# Patient Record
Sex: MALE | Race: WHITE | NOT HISPANIC OR LATINO | Employment: FULL TIME | URBAN - METROPOLITAN AREA
[De-identification: names, ages, dates, MRNs, and addresses within clinical notes are randomized per-mention and may not be internally consistent; named-entity substitution may affect disease eponyms.]

---

## 2017-03-18 ENCOUNTER — HOSPITAL ENCOUNTER (EMERGENCY)
Facility: HOSPITAL | Age: 32
Discharge: HOME | End: 2017-03-18
Attending: EMERGENCY MEDICINE
Payer: COMMERCIAL

## 2017-03-18 VITALS
RESPIRATION RATE: 18 BRPM | BODY MASS INDEX: 28.35 KG/M2 | WEIGHT: 160 LBS | HEART RATE: 65 BPM | SYSTOLIC BLOOD PRESSURE: 155 MMHG | HEIGHT: 63 IN | TEMPERATURE: 98 F | OXYGEN SATURATION: 98 % | DIASTOLIC BLOOD PRESSURE: 80 MMHG

## 2017-03-18 DIAGNOSIS — R19.7 DIARRHEA, UNSPECIFIED TYPE: Primary | ICD-10-CM

## 2017-03-18 LAB
ALBUMIN SERPL BCP-MCNC: 4.5 G/DL
ALP SERPL-CCNC: 86 U/L
ALT SERPL W/O P-5'-P-CCNC: 76 U/L
ANION GAP SERPL CALC-SCNC: 10 MMOL/L
AST SERPL-CCNC: 25 U/L
BASOPHILS # BLD AUTO: 0.02 K/UL
BASOPHILS NFR BLD: 0.1 %
BILIRUB SERPL-MCNC: 0.5 MG/DL
BUN SERPL-MCNC: 19 MG/DL
CALCIUM SERPL-MCNC: 9.9 MG/DL
CHLORIDE SERPL-SCNC: 104 MMOL/L
CO2 SERPL-SCNC: 27 MMOL/L
CREAT SERPL-MCNC: 1 MG/DL
DIFFERENTIAL METHOD: ABNORMAL
EOSINOPHIL # BLD AUTO: 0 K/UL
EOSINOPHIL NFR BLD: 0.3 %
ERYTHROCYTE [DISTWIDTH] IN BLOOD BY AUTOMATED COUNT: 12.4 %
EST. GFR  (AFRICAN AMERICAN): >60 ML/MIN/1.73 M^2
EST. GFR  (NON AFRICAN AMERICAN): >60 ML/MIN/1.73 M^2
GLUCOSE SERPL-MCNC: 147 MG/DL
HCT VFR BLD AUTO: 48.2 %
HGB BLD-MCNC: 16.4 G/DL
LYMPHOCYTES # BLD AUTO: 2.2 K/UL
LYMPHOCYTES NFR BLD: 15.4 %
MCH RBC QN AUTO: 32.2 PG
MCHC RBC AUTO-ENTMCNC: 34 %
MCV RBC AUTO: 95 FL
MONOCYTES # BLD AUTO: 0.7 K/UL
MONOCYTES NFR BLD: 5 %
NEUTROPHILS # BLD AUTO: 11.1 K/UL
NEUTROPHILS NFR BLD: 79.2 %
PLATELET # BLD AUTO: 326 K/UL
PMV BLD AUTO: 9.4 FL
POTASSIUM SERPL-SCNC: 4.5 MMOL/L
PROT SERPL-MCNC: 8 G/DL
RBC # BLD AUTO: 5.09 M/UL
SODIUM SERPL-SCNC: 141 MMOL/L
WBC # BLD AUTO: 14.06 K/UL

## 2017-03-18 PROCEDURE — 96361 HYDRATE IV INFUSION ADD-ON: CPT

## 2017-03-18 PROCEDURE — 80053 COMPREHEN METABOLIC PANEL: CPT

## 2017-03-18 PROCEDURE — 99283 EMERGENCY DEPT VISIT LOW MDM: CPT | Mod: 25

## 2017-03-18 PROCEDURE — 85025 COMPLETE CBC W/AUTO DIFF WBC: CPT

## 2017-03-18 PROCEDURE — 25000003 PHARM REV CODE 250: Performed by: EMERGENCY MEDICINE

## 2017-03-18 PROCEDURE — 96360 HYDRATION IV INFUSION INIT: CPT

## 2017-03-18 RX ORDER — DIPHENOXYLATE HYDROCHLORIDE AND ATROPINE SULFATE 2.5; .025 MG/1; MG/1
1 TABLET ORAL 4 TIMES DAILY PRN
Qty: 12 TABLET | Refills: 0 | Status: SHIPPED | OUTPATIENT
Start: 2017-03-18 | End: 2017-03-28

## 2017-03-18 RX ORDER — LOPERAMIDE HYDROCHLORIDE 2 MG/1
4 CAPSULE ORAL
Status: COMPLETED | OUTPATIENT
Start: 2017-03-18 | End: 2017-03-18

## 2017-03-18 RX ADMIN — LOPERAMIDE HYDROCHLORIDE 4 MG: 2 CAPSULE ORAL at 10:03

## 2017-03-18 RX ADMIN — SODIUM CHLORIDE 1000 ML: 0.9 INJECTION, SOLUTION INTRAVENOUS at 10:03

## 2017-03-18 NOTE — ED PROVIDER NOTES
SCRIBE #1 NOTE: I, Thomas Funk, am scribing for, and in the presence of, Alfredo Gallegos MD. I have scribed the entire note.      History      Chief Complaint   Patient presents with    Abdominal Pain     pt. c/o abdominal pain and diarrhea        Review of patient's allergies indicates:   Allergen Reactions    Pcn [penicillins]         HPI   HPI    3/18/2017, 9:42 AM   History obtained from the patient      History of Present Illness: Peewee Soto is a 31 y.o. male patient who presents to the Emergency Department for diarrhea which onset gradually yesterday. Symptoms are constant and moderate in severity. Pt reports he ate eggs for breakfast yesterday morning, and started to have multiple episodes of diarrhea a few hours later. States he thinks the eggs might have gone bad. No mitigating or exacerbating factors reported. Associated sxs include abdominal cramping and emesis (1 episode). Patient denies any fever, chills, CP, SOB, hematochezia, hematemesis, dysuria, weakness/numbness, dizziness, and all other sxs at this time. No prior tx PTA. No further complaints or concerns at this time.       Arrival mode: Personal vehicle     PCP: Primary Doctor No       Past Medical History:  Past Medical History:   Diagnosis Date    Acid reflux     Undescended testicle before puberty        Past Surgical History:  Past Surgical History:   Procedure Laterality Date    APPENDECTOMY N/A 11/11/2015    Procedure: APPENDECTOMY ;  Surgeon: Osvaldo Guy MD;  Location: [facility];  Service: General;  Laterality: N/A;    HERNIA REPAIR           Family History:  Family History   Problem Relation Age of Onset    Diabetes Mother     Hypertension Father     Heart disease Maternal Grandmother        Social History:  Social History     Social History Main Topics    Smoking status: Never Smoker    Smokeless tobacco: Not given    Alcohol use No    Drug use: No    Sexual activity: No       ROS   Review of Systems   Constitutional:  Negative for chills and fever.   HENT: Negative for sore throat.    Respiratory: Negative for shortness of breath.    Cardiovascular: Negative for chest pain.   Gastrointestinal: Positive for abdominal pain (cramping), diarrhea and vomiting. Negative for blood in stool.   Genitourinary: Negative for dysuria.   Musculoskeletal: Negative for back pain.   Skin: Negative for rash.   Neurological: Negative for dizziness, weakness and numbness.   Hematological: Does not bruise/bleed easily.   All other systems reviewed and are negative.    Physical Exam    Initial Vitals   BP Pulse Resp Temp SpO2   03/18/17 0930 03/18/17 0930 03/18/17 0930 03/18/17 0930 03/18/17 0930   156/90 96 18 98 °F (36.7 °C) 98 %      Physical Exam  Nursing Notes and Vital Signs Reviewed.  Constitutional: Patient is in no acute distress. Awake and alert. Well-developed and well-nourished.  Head: Atraumatic. Normocephalic.  Eyes: PERRL. EOM intact. Conjunctivae are not pale. No scleral icterus.  ENT: Mucous membranes are moist. Oropharynx is clear and symmetric.    Neck: Supple. Full ROM. No lymphadenopathy.  Cardiovascular: Regular rate. Regular rhythm. No murmurs, rubs, or gallops. Distal pulses are 2+ and symmetric.  Pulmonary/Chest: No respiratory distress. Clear to auscultation bilaterally. No wheezing, rales, or rhonchi.  Abdominal: Soft and non-distended.  There is no tenderness.  No rebound, guarding, or rigidity. Good bowel sounds.  Genitourinary: No CVA tenderness  Musculoskeletal: Moves all extremities. No obvious deformities. No edema. No calf tenderness.  Skin: Warm and dry.  Neurological:  Alert, awake, and appropriate.  Normal speech.  No acute focal neurological deficits are appreciated.  Psychiatric: Normal affect. Good eye contact. Appropriate in content.    ED Course    Procedures  ED Vital Signs:  Vitals:    03/18/17 0930 03/18/17 1352   BP: (!) 156/90 (!) 155/80   Pulse: 96 65   Resp: 18 18   Temp: 98 °F (36.7 °C)    TempSrc:  "Oral    SpO2: 98%    Weight: 72.6 kg (160 lb)    Height: 5' 3" (1.6 m)        Abnormal Lab Results:  Labs Reviewed   CBC W/ AUTO DIFFERENTIAL - Abnormal; Notable for the following:        Result Value    WBC 14.06 (*)     MCH 32.2 (*)     Gran # 11.1 (*)     Gran% 79.2 (*)     Lymph% 15.4 (*)     All other components within normal limits   COMPREHENSIVE METABOLIC PANEL - Abnormal; Notable for the following:     Glucose 147 (*)     ALT 76 (*)     All other components within normal limits        All Lab Results:  Results for orders placed or performed during the hospital encounter of 03/18/17   CBC auto differential   Result Value Ref Range    WBC 14.06 (H) 3.90 - 12.70 K/uL    RBC 5.09 4.60 - 6.20 M/uL    Hemoglobin 16.4 14.0 - 18.0 g/dL    Hematocrit 48.2 40.0 - 54.0 %    MCV 95 82 - 98 fL    MCH 32.2 (H) 27.0 - 31.0 pg    MCHC 34.0 32.0 - 36.0 %    RDW 12.4 11.5 - 14.5 %    Platelets 326 150 - 350 K/uL    MPV 9.4 9.2 - 12.9 fL    Gran # 11.1 (H) 1.8 - 7.7 K/uL    Lymph # 2.2 1.0 - 4.8 K/uL    Mono # 0.7 0.3 - 1.0 K/uL    Eos # 0.0 0.0 - 0.5 K/uL    Baso # 0.02 0.00 - 0.20 K/uL    Gran% 79.2 (H) 38.0 - 73.0 %    Lymph% 15.4 (L) 18.0 - 48.0 %    Mono% 5.0 4.0 - 15.0 %    Eosinophil% 0.3 0.0 - 8.0 %    Basophil% 0.1 0.0 - 1.9 %    Differential Method Automated    Comprehensive metabolic panel   Result Value Ref Range    Sodium 141 136 - 145 mmol/L    Potassium 4.5 3.5 - 5.1 mmol/L    Chloride 104 95 - 110 mmol/L    CO2 27 23 - 29 mmol/L    Glucose 147 (H) 70 - 110 mg/dL    BUN, Bld 19 6 - 20 mg/dL    Creatinine 1.0 0.5 - 1.4 mg/dL    Calcium 9.9 8.7 - 10.5 mg/dL    Total Protein 8.0 6.0 - 8.4 g/dL    Albumin 4.5 3.5 - 5.2 g/dL    Total Bilirubin 0.5 0.1 - 1.0 mg/dL    Alkaline Phosphatase 86 55 - 135 U/L    AST 25 10 - 40 U/L    ALT 76 (H) 10 - 44 U/L    Anion Gap 10 8 - 16 mmol/L    eGFR if African American >60 >60 mL/min/1.73 m^2    eGFR if non African American >60 >60 mL/min/1.73 m^2                  The Emergency " Provider reviewed the vital signs and test results, which are outlined above.    ED Discussion     1:40 PM: Reassessed pt at this time. Pt is in NAD and VSS. Pt states his condition has improved at this time. Discussed with pt all pertinent ED information and results. Discussed pt dx and plan of tx. Gave pt all f/u and return to the ED instructions. All questions and concerns were addressed at this time. Pt expresses understanding of information and instructions, and is comfortable with plan to discharge. Pt is stable for discharge.    Pre-hypertension/Hypertension: The pt has been informed that they may have pre-hypertension or hypertension based on a blood pressure reading in the ED. I recommend that the pt call the PCP listed on their discharge instructions or a physician of their choice this week to arrange f/u for further evaluation of possible pre-hypertension or hypertension.     I discussed with patient and/or family/caretaker that evaluation in the ED does not suggest any emergent or life threatening medical conditions requiring immediate intervention beyond what was provided in the ED, and I believe patient is safe for discharge.  Regardless, an unremarkable evaluation in the ED does not preclude the development or presence of a serious of life threatening condition. As such, patient was instructed to return immediately for any worsening or change in current symptoms.    ED Medication(s):  Medications   sodium chloride 0.9% bolus 1,000 mL (0 mLs Intravenous Stopped 3/18/17 1256)   loperamide capsule 4 mg (4 mg Oral Given 3/18/17 1007)       Discharge Medication List as of 3/18/2017  1:39 PM      START taking these medications    Details   diphenoxylate-atropine 2.5-0.025 mg (LOMOTIL) 2.5-0.025 mg per tablet Take 1 tablet by mouth 4 (four) times daily as needed for Diarrhea., Starting 3/18/2017, Until Tue 3/28/17, Print             Follow-up Information     Follow up with [facility] In  2 days.    Contact information:    [address] [facility] [id number] [phone]          Follow up with [facility].    Specialty:  Emergency Medicine    Why:  As needed, If symptoms worsen    Contact information:    [address] [facility] [address] [phone]            Medical Decision Making    Medical Decision Making:   Clinical Tests:   Lab Tests: Ordered and Reviewed           Scribe Attestation:   Scribe #1: I performed the above scribed service and the documentation accurately describes the services I performed. I attest to the accuracy of the note.    Attending:   Physician Attestation Statement for Scribe #1: I, Alfredo Gallegos MD, personally performed the services described in this documentation, as scribed by Thomas Funk, in my presence, and it is both accurate and complete.          Clinical Impression       ICD-10-CM ICD-9-CM   1. Diarrhea, unspecified type R19.7 787.91       Disposition:   Disposition: Discharged  Condition: Stable         lAfredo Gallegos MD  03/19/17 0716

## 2017-03-18 NOTE — ED AVS SNAPSHOT
[facility] -   [id number] [place] [id number]               Peewee Soto   3/18/2017  9:33 AM   ED    Description:  Male : 1985   Department:  [facility]           Your Care was Coordinated By:     Provider Role From To    Alfredo Gallegos MD Attending Provider 17 0935 --      Reason for Visit     Abdominal Pain           Diagnoses this Visit        Comments    Diarrhea, unspecified type    -  Primary       ED Disposition     ED Disposition Condition Comment    Discharge             To Do List           Follow-up Information     Follow up with [facility] In 2 days.    Contact information:    [address] [facility] [address]  [phone]          Follow up with [facility].    Specialty:  Emergency Medicine    Why:  As needed, If symptoms worsen    Contact information:    [address] [place] [id number] [phone]       These Medications        Disp Refills Start End    diphenoxylate-atropine 2.5-0.025 mg (LOMOTIL) 2.5-0.025 mg per tablet 12 tablet 0 3/18/2017 3/28/2017    Take 1 tablet by mouth 4 (four) times daily as needed for Diarrhea. - Oral    Pharmacy: [facility] [address] #: [phone]         [facility]     [facility] -  Assistance  Registered nurses in the [facility] provide clinical advisement, health education, appointment booking, and other advisory services.  Call for this free service at [phone].             Medications           Message regarding Medications     Verify the changes and/or additions to your medication regime listed below are the same as discussed with your clinician today.  If any of these changes or additions are incorrect, please notify your healthcare provider.        START taking these NEW medications        Refills     "diphenoxylate-atropine 2.5-0.025 mg (LOMOTIL) 2.5-0.025 mg per tablet 0    Sig: Take 1 tablet by mouth 4 (four) times daily as needed for Diarrhea.    Class: Print    Route: Oral      These medications were administered today        Dose Freq    sodium chloride 0.9% bolus 1,000 mL 1,000 mL ED 1 Time    Sig: Inject 1,000 mLs into the vein ED 1 Time.    Class: Normal    Route: Intravenous    loperamide capsule 4 mg 4 mg ED 1 Time    Sig: Take 2 capsules (4 mg total) by mouth ED 1 Time.    Class: Normal    Route: Oral           Verify that the below list of medications is an accurate representation of the medications you are currently taking.  If none reported, the list may be blank. If incorrect, please contact your healthcare provider. Carry this list with you in case of emergency.           Current Medications     diphenoxylate-atropine 2.5-0.025 mg (LOMOTIL) 2.5-0.025 mg per tablet Take 1 tablet by mouth 4 (four) times daily as needed for Diarrhea.    Lactobac. rhamnosus GG-inulin (CULTURELLE PROBIOTICS) 10 billion cell -200 mg CpSP     loratadine (CLARITIN) 10 mg tablet Take 10 mg by mouth every evening.     metformin (GLUCOPHAGE) 500 MG tablet Take 1 tablet (500 mg total) by mouth 2 (two) times daily with meals.    pantoprazole (PROTONIX) 40 MG tablet Take 40 mg by mouth once daily.           Clinical Reference Information           Your Vitals Were     BP Pulse Temp Resp Height Weight    156/90 (BP Location: Right arm, Patient Position: Sitting) 96 98 °F (36.7 °C) (Oral) 18 5' 3" (1.6 m) 72.6 kg (160 lb)    SpO2 BMI             98% 28.34 kg/m2         Allergies as of 3/18/2017        Reactions    Pcn [Penicillins]       Immunizations Administered on Date of Encounter - 3/18/2017     None      ED Micro, Lab, POCT     Start Ordered       Status Ordering Provider    03/18/17 0943 03/18/17 0943  CBC auto differential  Once      Final result     03/18/17 0943 03/18/17 0943  Comprehensive metabolic panel  STAT      " Final result     03/18/17 0943 03/18/17 0943  Stool culture  Once      Acknowledged     03/18/17 0943 03/18/17 0943  Stool Exam-Ova,Cysts,Parasites  Once      Acknowledged     03/18/17 0943 03/18/17 0943  WBC, Stool  Once      Acknowledged     03/18/17 0943 03/18/17 0943  Clostridium difficile EIA  Once      Acknowledged     03/18/17 0943 03/18/17 0943  Occult blood x 1, stool  Once      Acknowledged       ED Imaging Orders     None        Discharge Instructions         Treating Diarrhea    Diarrhea happens when you have loose, watery, or frequent bowel movements. It is a common problem with many causes. Most cases of diarrhea clear up on their own. But certain cases may need treatment. Be sure to see your healthcare provider if your symptoms do not improve within a few days.  Getting relief  Treatment of diarrhea depends on its cause. Diarrhea caused by bacterial or parasite infection is often treated with antibiotics. Diarrhea caused by other factors, such as a stomach virus, often improves with simple home treatment. The tips below may also help relieve your symptoms.  · Drink plenty of fluids. This helps prevent too much fluid loss (dehydration). Water, clear soups, and electrolyte solutions are good choices. Avoid alcohol, coffee, tea, and milk. These can irritate your intestines and make symptoms worse.  · Suck on ice chips if drinking makes you queasy.  · Return to your normal diet slowly. You may want to eat bland foods at first, such as rice and toast. Also, you may need to avoid certain foods for a while, such as dairy products. These can make symptoms worse. Ask your healthcare provider if there are any other foods you should avoid.  · If you were prescribed antibiotics, take them as directed.  · Do not take anti-diarrhea medicines without asking your healthcare provider first.  Call your healthcare provider   Call your healthcare provider if you have any of the following:   · A fever of 100.4°F (38.0°C)  or higher, or as directed by your healthcare provider  · Severe pain  · Worsening diarrhea or diarrhea for more than 2 days  · Bloody vomit or stool  · Signs of dehydration (dizziness, dry mouth and tongue, rapid pulse, dark urine)  Date Last Reviewed: 7/1/2016 © 2000-2016 [employer]. [address] [place] [address]. All rights reserved. This information is not intended as a substitute for professional medical care. Always follow your healthcare professional's instructions.           [facility] complies with applicable Federal civil rights laws and does not discriminate on the basis of race, color, national origin, age, disability, or sex.        Language Assistance Services     ATTENTION: Language assistance services are available, free of charge. Please call [phone].      ATENCIÓN: Si habla keiryañol, tiene a singleton disposición servicios gratuitos de asistencia lingüística. Llame al [phone].     NAWAF Ý: N?u b?n nói Ti?ng Vi?t, có các d?ch v? h? tr? ngôn ng? mi?n phí dành cho b?n. G?i s? [phone].

## 2017-03-18 NOTE — DISCHARGE INSTRUCTIONS

## 2017-03-18 NOTE — ED NOTES
"Patient c/o diarrhea x 2 days. Patient reports eating "yard eggs" yesterday and reports diarrhea began last night.    Patient identifiers verified and correct for Peewee Soto.    LOC: The patient is awake, alert and aware of environment with an appropriate affect, the patient is oriented x 3 and speaking appropriately.  APPEARANCE: Patient resting comfortably and in no acute distress, patient is clean and well groomed, patient's clothing is properly fastened.  SKIN: The skin is warm and dry, color consistent with ethnicity, patient has normal skin turgor and moist mucus membranes, skin intact, no breakdown or bruising noted.  MUSCULOSKELETAL: Patient moving all extremities spontaneously.  RESPIRATORY: Airway is open and patent, respirations are spontaneous.  CARDIAC: Patient has a normal rate, no periphreal edema noted, capillary refill < 3 seconds.  ABDOMEN: Soft and non tender to palpation.    "

## 2017-04-03 ENCOUNTER — HOSPITAL ENCOUNTER (EMERGENCY)
Facility: HOSPITAL | Age: 32
Discharge: HOME | End: 2017-04-03
Attending: EMERGENCY MEDICINE
Payer: COMMERCIAL

## 2017-04-03 VITALS
BODY MASS INDEX: 27.31 KG/M2 | SYSTOLIC BLOOD PRESSURE: 172 MMHG | HEIGHT: 64 IN | TEMPERATURE: 98 F | OXYGEN SATURATION: 98 % | WEIGHT: 160 LBS | HEART RATE: 88 BPM | DIASTOLIC BLOOD PRESSURE: 88 MMHG | RESPIRATION RATE: 16 BRPM

## 2017-04-03 DIAGNOSIS — L03.011 PARONYCHIA, RIGHT: Primary | ICD-10-CM

## 2017-04-03 PROCEDURE — 99283 EMERGENCY DEPT VISIT LOW MDM: CPT | Mod: 25

## 2017-04-03 PROCEDURE — 25000003 PHARM REV CODE 250: Performed by: NURSE PRACTITIONER

## 2017-04-03 PROCEDURE — 10060 I&D ABSCESS SIMPLE/SINGLE: CPT

## 2017-04-03 RX ORDER — ACETAMINOPHEN AND CODEINE PHOSPHATE 300; 30 MG/1; MG/1
1-2 TABLET ORAL EVERY 6 HOURS PRN
Qty: 14 TABLET | Refills: 0 | Status: SHIPPED | OUTPATIENT
Start: 2017-04-03 | End: 2017-07-04 | Stop reason: CLARIF

## 2017-04-03 RX ORDER — HYDROCODONE BITARTRATE AND ACETAMINOPHEN 10; 325 MG/1; MG/1
1 TABLET ORAL
Status: COMPLETED | OUTPATIENT
Start: 2017-04-03 | End: 2017-04-03

## 2017-04-03 RX ORDER — SULFAMETHOXAZOLE AND TRIMETHOPRIM 800; 160 MG/1; MG/1
1 TABLET ORAL 2 TIMES DAILY
Qty: 14 TABLET | Refills: 0 | Status: SHIPPED | OUTPATIENT
Start: 2017-04-03 | End: 2017-04-10

## 2017-04-03 RX ADMIN — HYDROCODONE BITARTRATE AND ACETAMINOPHEN 1 TABLET: 10; 325 TABLET ORAL at 11:04

## 2017-04-03 NOTE — ED PROVIDER NOTES
Encounter Date: 4/3/2017       History     Chief Complaint   Patient presents with    Abscess     pt has swelling and reddness on his cuticle     Review of patient's allergies indicates:   Allergen Reactions    Pcn [penicillins]      HPI Comments: 31 year old male with complaint of pain and swelling to right middle finger X 3 days.  Pt reports that he pulled the skin off of his cuticle 4 days ago.  Moderate pain.  Worse with movement.  Constant aching pain.      Past Medical History:   Diagnosis Date    Acid reflux     Undescended testicle before puberty      Past Surgical History:   Procedure Laterality Date    APPENDECTOMY N/A 11/11/2015    Procedure: APPENDECTOMY ;  Surgeon: Osvaldo Guy MD;  [facility]: [facility];  Service: General;  Laterality: N/A;    HERNIA REPAIR       Family History   Problem Relation Age of Onset    Diabetes Mother     Hypertension Father     Heart disease Maternal Grandmother      Social History   Substance Use Topics    Smoking status: Never Smoker    Smokeless tobacco: None    Alcohol use No     Review of Systems   Constitutional: Negative for fever.   HENT: Negative for sore throat.    Respiratory: Negative for shortness of breath.    Cardiovascular: Negative for chest pain.   Gastrointestinal: Negative for nausea.   Genitourinary: Negative for dysuria.   Musculoskeletal: Negative for back pain.   Skin: Negative for rash.        Swelling right middle finger   Neurological: Negative for weakness.   Hematological: Does not bruise/bleed easily.       Physical Exam   Initial Vitals   BP Pulse Resp Temp SpO2   04/03/17 1043 04/03/17 1043 04/03/17 1043 04/03/17 1043 04/03/17 1043   180/93 90 18 98.3 °F (36.8 °C) 96 %     Physical Exam    Nursing note and vitals reviewed.  Constitutional: He appears well-developed and well-nourished.   HENT:   Head: Normocephalic and atraumatic.   Eyes: Conjunctivae are normal. Pupils are equal, round, and reactive to light.   Neck: Normal range of  motion. Neck supple.   Cardiovascular: Normal rate, regular rhythm, normal heart sounds and intact distal pulses.   Pulmonary/Chest: Breath sounds normal.   Abdominal: Soft. There is no rebound and no guarding.   Musculoskeletal: Normal range of motion.   Neurological: He is alert.   Skin: Skin is warm and dry.   Fluctuance and swelling distal dorsal aspect right middle finger adjacent to nail base   Psychiatric: He has a normal mood and affect. His behavior is normal. Thought content normal.         ED Course   I & D - Incision and Drainage  Date/Time: 4/3/2017 10:57 AM  Performed by: BOLA GOMEZ  Authorized by: MADDY DE LA TORRE   Comments: Right middle finger prepped with betaine, small incision made with 18 g needle and pus expressed        Labs Reviewed - No data to display                            ED Course     Clinical Impression:   The encounter diagnosis was Paronychia, right.          Bola Gomez NP  04/03/17 1100

## 2017-04-03 NOTE — DISCHARGE INSTRUCTIONS
Paronychia of the Finger or Toe  Paronychia is an infection near a fingernail or toenail. It usually occurs when an opening in the cuticle or an ingrown toenail lets bacteria under the skin.  The infection will need to be drained if pus is present. If the infection has been caught early, you may need only antibiotic treatment. Healing will take about 1 to 2 weeks.  Home care  Follow these guidelines when caring for yourself at home:  · Clean and soak the toe or finger. Do this 2 times a day for the first 3 days. To do so:  ¨ Soak your foot or hand in a tub of warm water for 5 minutes. Or hold your toe or finger under a faucet of warm running water for 5 minutes.  ¨ Clean any crust away with soap and water using a cotton swab.  ¨ Put antibiotic ointment on the infected area.  · Change the dressing daily or any time it gets dirty.  · If you were given antibiotics, take them as directed until they are all gone.  · If your infection is on a toe, wear comfortable shoes with a lot of toe room. You can also wear open-toed sandals while your toe heals.  · You may use over-the-counter medicine (acetaminophen or ibuprofen to help with pain, unless another medicine was prescribed. If you have chronic liver or kidney disease, talk with your healthcare provider before using these medicines. Also talk with your provider if you've had a stomach ulcer or GI (gastrointestinal) bleeding.  Prevention  The following can prevent paronychia:  · Avoid cutting or playing with your cuticles at home.  · Don't bite your nails.  · Don't suck on your thumbs or fingers.  Follow-up care  Follow up with your healthcare provider, or as advised.  When to seek medical advice  Call your healthcare provider right away if any of these occur:  · Redness, pain, or swelling of the finger or toe gets worse  · Red streaks in the skin leading away from the wound  · Pus or fluid draining from the nail area  · Fever of 100.4ºF (38ºC) or higher, or as directed  by your provider  Date Last Reviewed: 8/1/2016  © 2518-7184 [employer]. [address] [place] [address], [place] [address]. All rights reserved. This information is not intended as a substitute for professional medical care. Always follow your healthcare professional's instructions.

## 2017-04-03 NOTE — ED AVS SNAPSHOT
[facility]  [address] [facility] [id number]               Peewee Soto   4/3/2017 10:45 AM   ED    Description:  Male : 1985   Department:  [facility]            Your Care was Coordinated By:     Provider Role From To    Bola Loco NP Nurse Practitioner 17 1045 --      Reason for Visit     Abscess           Diagnoses this Visit        Comments    Paronychia, right    -  Primary       ED Disposition     None           To Do List           Follow-up Information     Follow up with Primary Doctor No. Schedule an appointment as soon as possible for a visit in 2 days.      [facility]     [facility] -  Assistance  Unless otherwise directed by your provider, please contact [facility], our nurse care line that is available for  assistance.     Registered nurses in the [facility] provide: appointment scheduling, clinical advisement, health education, and other advisory services.  Call: [phone] (toll free)               Medications           Message regarding Medications     Verify the changes and/or additions to your medication regime listed below are the same as discussed with your clinician today.  If any of these changes or additions are incorrect, please notify your healthcare provider.        These medications were administered today        Dose Freq    hydrocodone-acetaminophen 10-325mg per tablet 1 tablet 1 tablet ED 1 Time    Sig: Take 1 tablet by mouth ED 1 Time.    Class: Normal    Route: Oral    Cosign for Ordering: Required by Rudy Mann MD           Verify that the below list of medications is an accurate representation of the medications you are currently taking.  If none reported, the list may be blank. If incorrect, please contact your healthcare provider. Carry this list with you in case of emergency.           Current Medications      "hydrocodone-acetaminophen 10-325mg per tablet 1 tablet Take 1 tablet by mouth ED 1 Time.    Lactobac. rhamnosus GG-inulin (CULTURELLE PROBIOTICS) 10 billion cell -200 mg CpSP     loratadine (CLARITIN) 10 mg tablet Take 10 mg by mouth every evening.     metformin (GLUCOPHAGE) 500 MG tablet Take 1 tablet (500 mg total) by mouth 2 (two) times daily with meals.    pantoprazole (PROTONIX) 40 MG tablet Take 40 mg by mouth once daily.           Clinical Reference Information           Your Vitals Were     BP Pulse Temp Resp Height Weight    180/93 (BP Location: Right arm, Patient Position: Sitting) 90 98.3 °F (36.8 °C) (Oral) 18 5' 4" (1.626 m) 72.6 kg (160 lb)    SpO2 BMI             96% 27.46 kg/m2         Allergies as of 4/3/2017        Reactions    Pcn [Penicillins]       Immunizations Administered on Date of Encounter - 4/3/2017     None      ED Micro, Lab, POCT     None      ED Imaging Orders     None        Discharge Instructions         Paronychia of the Finger or Toe  Paronychia is an infection near a fingernail or toenail. It usually occurs when an opening in the cuticle or an ingrown toenail lets bacteria under the skin.  The infection will need to be drained if pus is present. If the infection has been caught early, you may need only antibiotic treatment. Healing will take about 1 to 2 weeks.  Home care  Follow these guidelines when caring for yourself at home:  · Clean and soak the toe or finger. Do this 2 times a day for the first 3 days. To do so:  ¨ Soak your foot or hand in a tub of warm water for 5 minutes. Or hold your toe or finger under a faucet of warm running water for 5 minutes.  ¨ Clean any crust away with soap and water using a cotton swab.  ¨ Put antibiotic ointment on the infected area.  · Change the dressing daily or any time it gets dirty.  · If you were given antibiotics, take them as directed until they are all gone.  · If your infection is on a toe, wear comfortable shoes with a lot of toe " room. You can also wear open-toed sandals while your toe heals.  · You may use over-the-counter medicine (acetaminophen or ibuprofen to help with pain, unless another medicine was prescribed. If you have chronic liver or kidney disease, talk with your healthcare provider before using these medicines. Also talk with your provider if you've had a stomach ulcer or GI (gastrointestinal) bleeding.  Prevention  The following can prevent paronychia:  · Avoid cutting or playing with your cuticles at home.  · Don't bite your nails.  · Don't suck on your thumbs or fingers.  Follow-up care  Follow up with your healthcare provider, or as advised.  When to seek medical advice  Call your healthcare provider right away if any of these occur:  · Redness, pain, or swelling of the finger or toe gets worse  · Red streaks in the skin leading away from the wound  · Pus or fluid draining from the nail area  · Fever of 100.4ºF (38ºC) or higher, or as directed by your provider  Date Last Reviewed: 8/1/2016 © 2000-2016 [employer]. [address] [place] [address]. All rights reserved. This information is not intended as a substitute for professional medical care. Always follow your healthcare professional's instructions.           [facility] complies with applicable Federal civil rights laws and does not discriminate on the basis of race, color, national origin, age, disability, or sex.        Language Assistance Services     ATTENTION: Language assistance services are available, free of charge. Please call [phone].      ATENCIÓN: Si habla enedelia, tiene a singleton disposición servicios gratuitos de asistencia lingüística. Llame al [phone].     [facility] Ý: N?u b?n nói Ti?ng Vi?t, có các d?ch v? h? tr? ngôn ng? mi?n phí dành cho b?n. G?i s? [phone].

## 2017-07-04 ENCOUNTER — HOSPITAL ENCOUNTER (EMERGENCY)
Facility: HOSPITAL | Age: 32
Discharge: HOME | End: 2017-07-04
Payer: COMMERCIAL

## 2017-07-04 VITALS
DIASTOLIC BLOOD PRESSURE: 88 MMHG | TEMPERATURE: 98 F | SYSTOLIC BLOOD PRESSURE: 180 MMHG | HEIGHT: 64 IN | BODY MASS INDEX: 27.31 KG/M2 | OXYGEN SATURATION: 96 % | RESPIRATION RATE: 18 BRPM | WEIGHT: 160 LBS | HEART RATE: 70 BPM

## 2017-07-04 DIAGNOSIS — K02.9 PAIN DUE TO DENTAL CARIES: Primary | ICD-10-CM

## 2017-07-04 DIAGNOSIS — Z76.0 MEDICATION REFILL: ICD-10-CM

## 2017-07-04 DIAGNOSIS — E11.9 TYPE 2 DIABETES MELLITUS WITHOUT COMPLICATION, WITHOUT LONG-TERM CURRENT USE OF INSULIN: ICD-10-CM

## 2017-07-04 PROCEDURE — 99283 EMERGENCY DEPT VISIT LOW MDM: CPT

## 2017-07-04 RX ORDER — METFORMIN HYDROCHLORIDE 500 MG/1
500 TABLET ORAL 2 TIMES DAILY WITH MEALS
Qty: 60 TABLET | Refills: 0 | Status: ON HOLD | OUTPATIENT
Start: 2017-07-04 | End: 2019-03-19

## 2017-07-04 RX ORDER — ACETAMINOPHEN AND CODEINE PHOSPHATE 300; 30 MG/1; MG/1
1 TABLET ORAL EVERY 6 HOURS PRN
Qty: 12 TABLET | Refills: 0 | Status: SHIPPED | OUTPATIENT
Start: 2017-07-04 | End: 2019-03-18

## 2017-07-04 RX ORDER — HYDROGEN PEROXIDE 3 %
20 SOLUTION, NON-ORAL MISCELLANEOUS
COMMUNITY
End: 2019-03-21

## 2017-07-04 RX ORDER — CLINDAMYCIN HYDROCHLORIDE 300 MG/1
300 CAPSULE ORAL EVERY 8 HOURS
Qty: 21 CAPSULE | Refills: 0 | Status: SHIPPED | OUTPATIENT
Start: 2017-07-04 | End: 2017-07-11

## 2017-07-05 NOTE — ED PROVIDER NOTES
SCRIBE #1 NOTE: I, Brenda Jo, am scribing for, and in the presence of, SAMANTA Pacheco. I have scribed the entire note.      History      Chief Complaint   Patient presents with    Dental Pain     left bottom dental pain x 2 months, and is also out of his Metfromin 500 mg since January        Review of patient's allergies indicates:   Allergen Reactions    Pcn [penicillins] Rash        HPI   HPI    7/4/2017, 10:53 PM   History obtained from the patient      History of Present Illness: Peewee Soto is a 32 y.o. male patient who presents to the Emergency Department for L lower dental pain which onset gradually 2 months ago. Symptoms are constant and moderate in severity. Pt reports sxs are exacerbated when drinking something hot or cold. No other mitigating or exacerbating factors reported. Pt states he is out of his Metfromin since January. No associated sxs at this time. Patient denies any CP, SOB, fever, chills, N/V/D, difficulty swallowing, and all other sxs at this time. Prior Tx includes Tylenol. No further complaints or concerns at this time.         Arrival mode: Personal vehicle     PCP: Primary Doctor No       Past Medical History:  Past Medical History:   Diagnosis Date    Acid reflux     Undescended testicle before puberty        Past Surgical History:  Past Surgical History:   Procedure Laterality Date    APPENDECTOMY N/A 11/11/2015    Procedure: APPENDECTOMY ;  Surgeon: Osvaldo Guy MD;  Location: [facility];  Service: General;  Laterality: N/A;    HERNIA REPAIR           Family History:  Family History   Problem Relation Age of Onset    Diabetes Mother     Hypertension Father     Heart disease Maternal Grandmother        Social History:  Social History     Social History Main Topics    Smoking status: Never Smoker    Smokeless tobacco: Never Used    Alcohol use No    Drug use: No    Sexual activity: No       ROS   Review of Systems   Constitutional: Negative for chills and fever.    HENT: Positive for dental problem (L Lower). Negative for sore throat and trouble swallowing.    Respiratory: Negative for shortness of breath.    Cardiovascular: Negative for chest pain.   Gastrointestinal: Negative for diarrhea, nausea and vomiting.   Genitourinary: Negative for dysuria.   Musculoskeletal: Negative for back pain.   Skin: Negative for rash.   Neurological: Negative for weakness.   Hematological: Does not bruise/bleed easily.   All other systems reviewed and are negative.      Physical Exam      Initial Vitals [07/04/17 2202]   BP Pulse Resp Temp SpO2   (!) 180/88 70 18 98.4 °F (36.9 °C) 96 %      MAP       118.67          Physical Exam  Nursing Notes and Vital Signs Reviewed.  Constitutional: Patient is in no acute distress. Well-developed and well-nourished.  Head: Atraumatic. Normocephalic.  Eyes: PERRL. EOM intact. Conjunctivae are not pale. No scleral icterus.  ENT: Mucous membranes are moist. Oropharynx is clear and symmetric.    Mouth/Throat: No evident facial swelling. Patient handles secretions normally. Positive for dental caries on bottom teeth. negative for gingival edema or erythema. No palpable fluctuance. No evidence of periodontal or periapical abscess. No trismus.   Neck: Supple. Full ROM. No lymphadenopathy.  Cardiovascular: Regular rate. Regular rhythm. No murmurs, rubs, or gallops. Distal pulses are 2+ and symmetric.  Pulmonary/Chest: No respiratory distress. Clear to auscultation bilaterally. No wheezing, rales, or rhonchi.  Abdominal: Soft and non-distended.  There is no tenderness.  No rebound, guarding, or rigidity. Good bowel sounds.  Genitourinary: No CVA tenderness  Musculoskeletal: Moves all extremities. No obvious deformities. No edema. No calf tenderness.  Skin: Warm and dry.  Neurological:  Alert, awake, and appropriate.  Normal speech.  No acute focal neurological deficits are appreciated.  Psychiatric: Normal affect. Good eye contact. Appropriate in content.    ED  "Course    Procedures  ED Vital Signs:  Vitals:    07/04/17 2202   BP: (!) 180/88   Pulse: 70   Resp: 18   Temp: 98.4 °F (36.9 °C)   TempSrc: Oral   SpO2: 96%   Weight: 72.6 kg (160 lb)   Height: 5' 4" (1.626 m)                The Emergency Provider reviewed the vital signs and test results, which are outlined above.    ED Discussion     11:00 PM: Discussed with pt all pertinent ED information and results. Discussed pt dx and plan of tx. Gave pt all f/u and return to the ED instructions. All questions and concerns were addressed at this time. Pt expresses understanding of information and instructions, and is comfortable with plan to discharge. Pt is stable for discharge.    I discussed with patient and/or family/caretaker that evaluation in the ED does not suggest any emergent or life threatening medical conditions requiring immediate intervention beyond what was provided in the ED, and I believe patient is safe for discharge.  Regardless, an unremarkable evaluation in the ED does not preclude the development or presence of a serious of life threatening condition. As such, patient was instructed to return immediately for any worsening or change in current symptoms.    Pre-hypertension/Hypertension: The pt has been informed that they may have pre-hypertension or hypertension based on a blood pressure reading in the ED. I recommend that the pt call the PCP listed on their discharge instructions or a physician of their choice this week to arrange f/u for further evaluation of possible pre-hypertension or hypertension.       ED Medication(s):  Medications - No data to display    Discharge Medication List as of 7/4/2017 11:06 PM      START taking these medications    Details   acetaminophen-codeine 300-30mg (TYLENOL-CODEINE #3) 300-30 mg Tab Take 1 tablet by mouth every 6 (six) hours as needed., Starting Tue 7/4/2017, Print      clindamycin (CLEOCIN) 300 MG capsule Take 1 capsule (300 mg total) by mouth every 8 (eight) " hours., Starting Tue 7/4/2017, Until Tue 7/11/2017, Print             Follow-up Information     [facility] In 2 days.    Contact information:  [facility]  SAYRA Saldana  [phone]                   Medical Decision Making              Scribe Attestation:   Scribe #1: I performed the above scribed service and the documentation accurately describes the services I performed. I attest to the accuracy of the note.    Attending:   Physician Attestation Statement for Scribe #1: I, SAMANTA Pacheco, personally performed the services described in this documentation, as scribed by Brenda Jo, in my presence, and it is both accurate and complete.          Clinical Impression       ICD-10-CM ICD-9-CM   1. Pain due to dental caries K02.9 521.00   2. Medication refill Z76.0 V68.1   3. Type 2 diabetes mellitus without complication, without long-term current use of insulin E11.9 250.00       Disposition:   Disposition: Discharged  Condition: Stable         Alecia Tavarez PA-C  07/05/17 0210

## 2017-08-02 ENCOUNTER — HOSPITAL ENCOUNTER (EMERGENCY)
Facility: HOSPITAL | Age: 32
Discharge: HOME | End: 2017-08-02
Payer: COMMERCIAL

## 2017-08-02 VITALS
WEIGHT: 160 LBS | HEIGHT: 64 IN | SYSTOLIC BLOOD PRESSURE: 182 MMHG | DIASTOLIC BLOOD PRESSURE: 98 MMHG | TEMPERATURE: 98 F | BODY MASS INDEX: 27.31 KG/M2 | OXYGEN SATURATION: 95 % | HEART RATE: 74 BPM | RESPIRATION RATE: 18 BRPM

## 2017-08-02 DIAGNOSIS — K08.89 PAIN, DENTAL: Primary | ICD-10-CM

## 2017-08-02 PROCEDURE — 99283 EMERGENCY DEPT VISIT LOW MDM: CPT

## 2017-08-02 RX ORDER — TRAMADOL HYDROCHLORIDE 50 MG/1
50 TABLET ORAL EVERY 6 HOURS PRN
Qty: 12 TABLET | Refills: 0 | Status: SHIPPED | OUTPATIENT
Start: 2017-08-02 | End: 2017-08-12

## 2017-08-02 NOTE — ED PROVIDER NOTES
SCRIBE #1 NOTE: I, Stephen Fuchs, am scribing for, and in the presence of, SAMANTA Dominguez. I have scribed the entire note.      History      Chief Complaint   Patient presents with    Dental Pain     was at dentist office today and told he needed a root canal but the pain has become unbearable, pt asking for pain medication       Review of patient's allergies indicates:   Allergen Reactions    Pcn [penicillins] Rash        HPI   HPI    8/2/2017, 4:36 PM   History obtained from the patient      History of Present Illness: Peewee Soto is a 32 y.o. male patient who presents to the Emergency Department for left lower dental pain which onset gradually 3 days ago. Symptoms are constant and moderate in severity.  No mitigating or exacerbating factors reported. No other associated sxs reported. Pt states that he had an appointment with his dentist today and received a prescription for Antibiotics and 800 mg Ibuprofen, however, he is currently requesting stronger pain medication. Patient denies any fever, chills, diaphoresis, facial swelling, drooling, nasal congestion, sore throat, n/v/d, HA, CP, SOB, and all other sxs at this time. No further complaints or concerns at this time.         Arrival mode: Personal vehicle    PCP: Primary Doctor No       Past Medical History:  Past Medical History:   Diagnosis Date    Acid reflux     Undescended testicle before puberty        Past Surgical History:  Past Surgical History:   Procedure Laterality Date    APPENDECTOMY N/A 11/11/2015    Procedure: APPENDECTOMY ;  Surgeon: Osvaldo Guy MD;  Location: [facility];  Service: General;  Laterality: N/A;    HERNIA REPAIR           Family History:  Family History   Problem Relation Age of Onset    Diabetes Mother     Hypertension Father     Heart disease Maternal Grandmother        Social History:  Social History     Social History Main Topics    Smoking status: Never Smoker    Smokeless tobacco: Never Used     Alcohol use No    Drug use: No    Sexual activity: No       ROS   Review of Systems   Constitutional: Negative for chills, diaphoresis and fever.   HENT: Positive for dental problem (left lower). Negative for congestion, drooling, facial swelling, postnasal drip, rhinorrhea and sore throat.    Respiratory: Negative for shortness of breath.    Cardiovascular: Negative for chest pain.   Gastrointestinal: Negative for abdominal pain, diarrhea, nausea and vomiting.   Genitourinary: Negative for difficulty urinating, dysuria, frequency, hematuria and urgency.   Musculoskeletal: Negative for back pain.   Skin: Negative for rash.   Neurological: Negative for dizziness, syncope, weakness, light-headedness, numbness and headaches.   All other systems reviewed and are negative.      Physical Exam      Initial Vitals [08/02/17 1628]   BP Pulse Resp Temp SpO2   (S) (!) 184/113 74 18 97.9 °F (36.6 °C) 95 %      MAP       136.67          Physical Exam  Nursing Notes and Vital Signs Reviewed.  Constitutional: Patient is in no apparent distress. Well-developed and well-nourished.  Head: Atraumatic. Normocephalic.  Eyes: PERRL. EOM intact. Conjunctivae are not pale. No scleral icterus.  ENT: Mucous membranes are moist. Oropharynx is clear and symmetric.  Mouth/Throat: Tenderness along tooth #19, no evidence of facial swelling, trismus, or drooling.  Patient handles secretions normally. Negative for dental caries. Negative for gingival edema. No palpable fluctuance. No evidence of periodontal or periapical abscess.   Neck: Supple. Full ROM. No lymphadenopathy.  Cardiovascular: Regular rate. Regular rhythm. No murmurs, rubs, or gallops.  Pulmonary/Chest: No respiratory distress. Clear to auscultation bilaterally. No wheezing, rales, or rhonchi.  Abdominal: Soft and non-distended.  Abd is non-tender.   Musculoskeletal: Moves all extremities. No obvious deformities. No edema. No calf tenderness.  Skin: Warm and dry.  Neurological:   "Alert, awake, and appropriate.  Normal speech.  No acute focal neurological deficits are appreciated.  Psychiatric: Normal affect. Good eye contact. Appropriate in content.    ED Course    Procedures  ED Vital Signs:  Vitals:    08/02/17 1628 08/02/17 1631   BP: (S) (!) 184/113 (!) 182/98   Pulse: 74    Resp: 18    Temp: 97.9 °F (36.6 °C)    TempSrc: Oral    SpO2: 95%    Weight: 72.6 kg (160 lb)    Height: 5' 4" (1.626 m)                 The Emergency Provider reviewed the vital signs and test results, which are outlined above.    ED Discussion     4:44 PM: Discussed with pt all pertinent ED information and results. Discussed pt dx and plan of tx. Gave pt all f/u and return to the ED instructions. All questions and concerns were addressed at this time. Pt expresses understanding of information and instructions, and is comfortable with plan to discharge. Pt is stable for discharge.    I discussed with patient and/or family/caretaker that evaluation in the ED does not suggest any emergent or life threatening medical conditions requiring immediate intervention beyond what was provided in the ED, and I believe patient is safe for discharge.  Regardless, an unremarkable evaluation in the ED does not preclude the development or presence of a serious of life threatening condition. As such, patient was instructed to return immediately for any worsening or change in current symptoms.        ED Medication(s):  Medications - No data to display    New Prescriptions    TRAMADOL (ULTRAM) 50 MG TABLET    Take 1 tablet (50 mg total) by mouth every 6 (six) hours as needed for Pain.       Follow-up Information     Schedule an appointment as soon as possible for a visit  with DENTIST.                   Medical Decision Making              Scribe Attestation:   Scribe #1: I performed the above scribed service and the documentation accurately describes the services I performed. I attest to the accuracy of the note.    Attending: "   Physician Attestation Statement for Scribe #1: I, SAMANTA Dominguez, personally performed the services described in this documentation, as scribed by Stephen Fuchs, in my presence, and it is both accurate and complete.          Clinical Impression       ICD-10-CM ICD-9-CM   1. Pain, dental K08.89 525.9       Disposition:   Disposition: Discharged  Condition: Stable

## 2017-08-02 NOTE — ED PROVIDER NOTES
Encounter Date: 8/2/2017       History     Chief Complaint   Patient presents with    Dental Pain     was at dentist office today and told he needed a root canal but the pain has become unbearable, pt asking for pain medication     The history is provided by the patient.   Dental Pain   The primary symptoms include mouth pain. Primary symptoms do not include fever, shortness of breath or sore throat. The symptoms began several days ago. The symptoms are unchanged. The symptoms are recurrent. The symptoms occur frequently.   Mouth pain occurs weekly. Affected locations include: teeth. At its highest the mouth pain was at 3/10.   Additional symptoms include: dental sensitivity to temperature and gum tenderness. Additional symptoms do not include: facial swelling.     Review of patient's allergies indicates:   Allergen Reactions    Pcn [penicillins] Rash     Past Medical History:   Diagnosis Date    Acid reflux     Undescended testicle before puberty      Past Surgical History:   Procedure Laterality Date    APPENDECTOMY N/A 11/11/2015    Procedure: APPENDECTOMY ;  Surgeon: Osvaldo Guy MD;  Location: [facility];  Service: General;  Laterality: N/A;    HERNIA REPAIR       Family History   Problem Relation Age of Onset    Diabetes Mother     Hypertension Father     Heart disease Maternal Grandmother      Social History   Substance Use Topics    Smoking status: Never Smoker    Smokeless tobacco: Never Used    Alcohol use No     Review of Systems   Constitutional: Negative for diaphoresis and fever.   HENT: Positive for dental problem. Negative for facial swelling and sore throat.    Eyes: Negative for photophobia and redness.   Respiratory: Negative for shortness of breath.    Cardiovascular: Negative for chest pain.   Gastrointestinal: Negative for abdominal pain, constipation, diarrhea, nausea and vomiting.   Endocrine: Negative for polydipsia and polyphagia.   Genitourinary: Negative for dysuria and frequency.    Musculoskeletal: Negative for back pain.   Skin: Negative for rash.   Neurological: Negative for weakness.   Hematological: Does not bruise/bleed easily.   Psychiatric/Behavioral: The patient is not nervous/anxious.    All other systems reviewed and are negative.      Physical Exam     Initial Vitals [08/02/17 1628]   BP Pulse Resp Temp SpO2   (S) (!) 184/113 74 18 97.9 °F (36.6 °C) 95 %      MAP       136.67         Physical Exam    Nursing note and vitals reviewed.  Constitutional: He appears well-developed and well-nourished.   HENT:   Head: Normocephalic and atraumatic.   Right Ear: External ear normal.   Left Ear: External ear normal.   Nose: Nose normal.   Mouth/Throat: Oropharynx is clear and moist. Dental caries present. No dental abscesses.   Eyes: Conjunctivae and EOM are normal. Pupils are equal, round, and reactive to light.   Neck: Normal range of motion. Neck supple.   Cardiovascular: Normal rate, regular rhythm, normal heart sounds and intact distal pulses.   Pulmonary/Chest: Breath sounds normal. No respiratory distress. He has no wheezes. He has no rhonchi. He has no rales.   Abdominal: Soft. Bowel sounds are normal. He exhibits no distension. There is no tenderness. There is no rebound and no guarding.   Musculoskeletal: Normal range of motion.   Neurological: He is alert and oriented to person, place, and time. He has normal strength.   Skin: Skin is warm and dry.   Psychiatric: He has a normal mood and affect. His behavior is normal. Judgment and thought content normal.         ED Course   Procedures  Labs Reviewed - No data to display          Medical Decision Making:   ED Management:  Recheck of Pt's BP revealed a pressure of 148/87                   ED Course     Clinical Impression:   The encounter diagnosis was Pain, dental.    Disposition:   Disposition: Discharged  Condition: Stable                        SAMANTA Dominguez  08/06/17 0808

## 2017-08-06 ENCOUNTER — HOSPITAL ENCOUNTER (EMERGENCY)
Facility: HOSPITAL | Age: 32
Discharge: HOME | End: 2017-08-06
Payer: COMMERCIAL

## 2017-08-06 VITALS
HEART RATE: 108 BPM | RESPIRATION RATE: 18 BRPM | HEIGHT: 63 IN | OXYGEN SATURATION: 99 % | BODY MASS INDEX: 29.23 KG/M2 | DIASTOLIC BLOOD PRESSURE: 111 MMHG | SYSTOLIC BLOOD PRESSURE: 178 MMHG | TEMPERATURE: 99 F | WEIGHT: 165 LBS

## 2017-08-06 DIAGNOSIS — K59.03 DRUG-INDUCED CONSTIPATION: Primary | ICD-10-CM

## 2017-08-06 PROCEDURE — 99283 EMERGENCY DEPT VISIT LOW MDM: CPT

## 2017-08-06 RX ORDER — GLYCERIN 1 G/1
1 SUPPOSITORY RECTAL
Qty: 10 SUPPOSITORY | Refills: 0 | Status: SHIPPED | OUTPATIENT
Start: 2017-08-06 | End: 2019-03-18

## 2017-08-07 ENCOUNTER — HOSPITAL ENCOUNTER (EMERGENCY)
Facility: HOSPITAL | Age: 32
Discharge: HOME | End: 2017-08-07
Payer: COMMERCIAL

## 2017-08-07 VITALS
SYSTOLIC BLOOD PRESSURE: 166 MMHG | BODY MASS INDEX: 27.31 KG/M2 | RESPIRATION RATE: 17 BRPM | TEMPERATURE: 99 F | WEIGHT: 160 LBS | HEART RATE: 95 BPM | HEIGHT: 64 IN | DIASTOLIC BLOOD PRESSURE: 97 MMHG

## 2017-08-07 DIAGNOSIS — K56.41 FECAL IMPACTION: Primary | ICD-10-CM

## 2017-08-07 PROCEDURE — 25000003 PHARM REV CODE 250: Performed by: PHYSICIAN ASSISTANT

## 2017-08-07 PROCEDURE — 99283 EMERGENCY DEPT VISIT LOW MDM: CPT

## 2017-08-07 RX ORDER — PSEUDOEPHEDRINE/ACETAMINOPHEN 30MG-500MG
100 TABLET ORAL
Status: COMPLETED | OUTPATIENT
Start: 2017-08-07 | End: 2017-08-07

## 2017-08-07 RX ORDER — SYRING-NEEDL,DISP,INSUL,0.3 ML 29 G X1/2"
300 SYRINGE, EMPTY DISPOSABLE MISCELLANEOUS
Status: COMPLETED | OUTPATIENT
Start: 2017-08-07 | End: 2017-08-07

## 2017-08-07 RX ADMIN — SODIUM CHLORIDE 500 ML: 0.9 INJECTION, SOLUTION INTRAVENOUS at 02:08

## 2017-08-07 RX ADMIN — Medication 100 ML: at 02:08

## 2017-08-07 RX ADMIN — MAGESIUM CITRATE 300 ML: 1.75 LIQUID ORAL at 02:08

## 2017-08-07 NOTE — ED PROVIDER NOTES
Encounter Date: 8/7/2017       History     Chief Complaint   Patient presents with    Constipation     pt c/o no BM in 4-5 days, taking pain medication for dental problem     33 yo WM returns for opioid induced constipation.  No relief with fleet's enema or glycerin suppository prescribed by me earlier this morning.    + abdominal cramping now      The history is provided by the patient.   Constipation    Illness onset: 3-4 days.  Due to Ultram and Tylenol #3 for dental pain. The problem has been unchanged. The pain is at a severity of 2/10. The stool is described as hard. There was no prior successful therapy. There was no prior unsuccessful therapy. Associated symptoms include abdominal pain (cramping). Pertinent negatives include no diarrhea, no rectal pain and no vomiting. He has been eating and drinking normally. The infant is normal consumption. There were sick contacts none.     Review of patient's allergies indicates:   Allergen Reactions    Pcn [penicillins] Rash     Past Medical History:   Diagnosis Date    Acid reflux     Undescended testicle before puberty      Past Surgical History:   Procedure Laterality Date    APPENDECTOMY N/A 11/11/2015    Procedure: APPENDECTOMY ;  Surgeon: Osvaldo Guy MD;  Location: [facility];  Service: General;  Laterality: N/A;    HERNIA REPAIR       Family History   Problem Relation Age of Onset    Diabetes Mother     Hypertension Father     Heart disease Maternal Grandmother      Social History   Substance Use Topics    Smoking status: Never Smoker    Smokeless tobacco: Never Used    Alcohol use No     Review of Systems   Constitutional: Negative.    Gastrointestinal: Positive for abdominal pain (cramping) and constipation. Negative for blood in stool, diarrhea, rectal pain and vomiting.       Physical Exam     Initial Vitals [08/07/17 0158]   BP Pulse Resp Temp SpO2   (!) 166/97 95 17 98.5 °F (36.9 °C) --      MAP       120         Physical Exam    Constitutional:  Vital signs are normal. He appears well-developed and well-nourished.   HENT:   Head: Normocephalic and atraumatic.   Mouth/Throat: Oropharynx is clear and moist.   Eyes: Conjunctivae are normal.   Neck: Normal range of motion. Neck supple.   Cardiovascular: Normal rate, regular rhythm and normal heart sounds.   Pulmonary/Chest: Breath sounds normal. No respiratory distress.   Abdominal: Soft. Normal appearance and bowel sounds are normal. He exhibits no distension. There is no tenderness.   Genitourinary: Prostate normal. Rectal exam shows tenderness. Rectal exam shows no external hemorrhoid, no internal hemorrhoid, no fissure, no mass, anal tone normal and guaiac negative stool. Guaiac negative stool.   Genitourinary Comments: Fecal impaction noted during rectal exam    KLEVER Florian in room for exam    Fecal material around anus noted    Unable to remove fecal impaction during AR exam   Neurological: He is alert. Gait normal.   Normal speech   Skin: Skin is warm and dry. Capillary refill takes less than 2 seconds. No rash noted.         ED Course   Procedures  Labs Reviewed - No data to display     0300 - impaction smaller after 2/3 of enema and fecal disimpaction manually.  Pt unable to tolerate full disimpaction    Pt would like to continue Fleet's enema and glycerin suppositories at home    Amount of impaction has reduced and much more lubricated.  Pt will most likely be able to pass at home.    Pt states that he will return if no improvement          0310 - pt passed fecal impaction.  Feels much better.  Just smaller than a baseball sized feces particle                 ED Course     Clinical Impression:   The encounter diagnosis was Fecal impaction.                           Jimmy Alvarez PA-C  08/07/17 0308       Jimmy Alvarez PA-C  08/07/17 0312

## 2017-08-07 NOTE — ED PROVIDER NOTES
"SCRIBE #1 NOTE: I, Kristie Jensen, am scribing for, and in the presence of, Jimmy Alvarez PA-C. I have scribed the entire note.      History      Chief Complaint   Patient presents with    Constipation     Pt reports constipation x4 days after tramadol use.       Review of patient's allergies indicates:   Allergen Reactions    Pcn [penicillins] Rash        HPI   HPI    8/6/2017, 7:23 PM   History obtained from the patient      History of Present Illness: Peewee Soto is a 32 y.o. male patient who presents to the Emergency Department for constipation. Pt states last normal BM was 3 days ago. Pt believes constipation is side effect of pain medications he has been taking for four days for tooth ache. Pt says he stopped taking Tramadol yesterday but is still taking Tylenol 3. Symptoms are moderate in severity. No mitigating or exacerbating factors reported. Pt also complains of abdominal discomfort only with pressure. Pt described the discomfort as "feeling full." Patient denies all other sxs at this time. Prior Tx includes laxatives and stool softeners, with no relief.  Started yesterday. No further complaints or concerns at this time.         Arrival mode: Personal vehicle    PCP: Primary Doctor No       Past Medical History:  Past Medical History:   Diagnosis Date    Acid reflux     Undescended testicle before puberty        Past Surgical History:  Past Surgical History:   Procedure Laterality Date    APPENDECTOMY N/A 11/11/2015    Procedure: APPENDECTOMY ;  Surgeon: Osvaldo Guy MD;  Location: [facility];  Service: General;  Laterality: N/A;    HERNIA REPAIR           Family History:  Family History   Problem Relation Age of Onset    Diabetes Mother     Hypertension Father     Heart disease Maternal Grandmother        Social History:  Social History     Social History Main Topics    Smoking status: Never Smoker    Smokeless tobacco: Never Used    Alcohol use No    Drug use: No    Sexual activity: " "No       ROS   Review of Systems   Constitutional: Negative for chills and fever.   HENT: Negative for congestion and sore throat.    Respiratory: Negative for shortness of breath.    Cardiovascular: Negative for chest pain.   Gastrointestinal: Positive for constipation. Negative for nausea and vomiting.   Genitourinary: Negative for dysuria and hematuria.   Musculoskeletal: Negative for back pain.   Skin: Negative for rash.   Neurological: Negative for weakness.   Hematological: Does not bruise/bleed easily.   All other systems reviewed and are negative.    Physical Exam      Initial Vitals [08/06/17 1903]   BP Pulse Resp Temp SpO2   (!) 178/111 108 18 98.5 °F (36.9 °C) 99 %      MAP       133.33          Physical Exam  Nursing Notes and Vital Signs Reviewed.  Constitutional: Patient is in mild distress. Obviously nervous upon exam. Well-developed and well-nourished.  Head: Atraumatic. Normocephalic.  Eyes: PERRL. EOM intact. Conjunctivae are not pale. No scleral icterus.  ENT: Mucous membranes are moist. Oropharynx is clear and symmetric.    Neck: Supple. Full ROM. No lymphadenopathy.  Cardiovascular: Vitals show mild hypertension and mild tachycardia. Regular rhythm.   Pulmonary/Chest: No respiratory distress.   Abdominal: Soft and non-distended.  There is no tenderness.  No rebound, guarding, or rigidity. Good bowel sounds.  Musculoskeletal: Moves all extremities. No obvious deformities. No edema. No calf tenderness.  Skin: Warm and dry.  Neurological:  Alert, awake, and appropriate.  Normal speech.  No acute focal neurological deficits are appreciated.  Psychiatric: Normal affect. Good eye contact. Appropriate in content.    ED Course    Procedures  ED Vital Signs:  Vitals:    08/06/17 1903   BP: (!) 178/111   Pulse: 108   Resp: 18   Temp: 98.5 °F (36.9 °C)   TempSrc: Oral   SpO2: 99%   Weight: 74.8 kg (165 lb)   Height: 5' 3" (1.6 m)       All Lab Results:  None ordered.    Imaging Results:  None ordered.      "      The Emergency Provider reviewed the vital signs and test results, which are outlined above.    ED Discussion     7:27 PM: Pt prefers to try fleet enema and rectal suppositories at home. Pt does not want enema given in ED today and refuses rectal exam.    7:28 PM: Discussed with pt all pertinent ED information. Discussed pt dx and plan of tx. Gave pt all f/u and return to the ED instructions. All questions and concerns were addressed at this time. Pt expresses understanding of information and instructions, and is comfortable with plan to discharge. Pt is stable for discharge.    Mild HTN and Tachy due to symptoms no concerning symptoms at this time    Pt to return if home meds are ineffective    ED Medication(s):  Medications - No data to display    New Prescriptions    GLYCERIN ADULT (FLEET GLYCERIN, ADULT,) SUPPOSITORY    Place 1 suppository rectally as needed for Constipation.    SODIUM PHOSPHATES (FLEET ENEMA) 19-7 GRAM/118 ML ENEM    Place 1 enema rectally once.             Medical Decision Making              Scribe Attestation:   Scribe #1: I performed the above scribed service and the documentation accurately describes the services I performed. I attest to the accuracy of the note.    Attending:   Physician Attestation Statement for Scribe #1: I, Jimmy Alvarez PA-C, personally performed the services described in this documentation, as scribed by Kristie Jensen, in my presence, and it is both accurate and complete.          Clinical Impression       ICD-10-CM ICD-9-CM   1. Drug-induced constipation K59.03 564.09     E980.5       Disposition:   Disposition: Discharged  Condition: Stable         Jimmy Alvarez PA-C  08/06/17 1939

## 2017-08-15 ENCOUNTER — HOSPITAL ENCOUNTER (EMERGENCY)
Facility: HOSPITAL | Age: 32
Discharge: HOME | End: 2017-08-15
Attending: EMERGENCY MEDICINE
Payer: COMMERCIAL

## 2017-08-15 VITALS
OXYGEN SATURATION: 98 % | SYSTOLIC BLOOD PRESSURE: 170 MMHG | HEART RATE: 78 BPM | TEMPERATURE: 98 F | RESPIRATION RATE: 18 BRPM | DIASTOLIC BLOOD PRESSURE: 93 MMHG | BODY MASS INDEX: 27.31 KG/M2 | WEIGHT: 160 LBS | HEIGHT: 64 IN

## 2017-08-15 DIAGNOSIS — R03.0 ELEVATED BLOOD PRESSURE READING: ICD-10-CM

## 2017-08-15 DIAGNOSIS — K02.9 DENTAL CARIES: ICD-10-CM

## 2017-08-15 DIAGNOSIS — K08.89 DENTALGIA: Primary | ICD-10-CM

## 2017-08-15 PROCEDURE — 99283 EMERGENCY DEPT VISIT LOW MDM: CPT

## 2017-08-15 RX ORDER — DICLOFENAC SODIUM 50 MG/1
50 TABLET, DELAYED RELEASE ORAL 3 TIMES DAILY PRN
Qty: 15 TABLET | Refills: 0 | Status: SHIPPED | OUTPATIENT
Start: 2017-08-15 | End: 2019-03-18

## 2017-08-15 NOTE — ED PROVIDER NOTES
SCRIBE #1 NOTE: I, Conchita Garcia, am scribing for, and in the presence of, SAMANTA Vora. I have scribed the entire note.      History      Chief Complaint   Patient presents with    Dental Pain     patient c/o left lower dental pain        Review of patient's allergies indicates:   Allergen Reactions    Pcn [penicillins] Rash        HPI   HPI    8/15/2017, 4:37 PM   History obtained from the patient      History of Present Illness: Peewee Soto Jr. is a 32 y.o. male patient with PMHx of DM who presents to the Emergency Department for dental pain which onset gradually 1 month ago. Symptoms are constant and moderate in severity. Pain is located to 2nd molar on bottom left side of jaw. Pt has seen 2 dentists and cannot afford to get an extraction. No mitigating or exacerbating factors reported. No associated sxs. Patient denies any fever, n/v/d, abd pain, CP, SOB, facial swelling, trouble swallowing, mouth sores, sore throat, dysuria, hematuria, numbness, weakness, HA, and all other sxs at this time. Prior Tx includes tylenol and ibuprofen 800 mg with relief. No further complaints or concerns at this time.       Arrival mode: Personal vehicle    PCP: Primary Doctor No       Past Medical History:  Past Medical History:   Diagnosis Date    Acid reflux     Undescended testicle before puberty        Past Surgical History:  Past Surgical History:   Procedure Laterality Date    APPENDECTOMY N/A 11/11/2015    Procedure: APPENDECTOMY ;  Surgeon: Osvaldo Guy MD;  Location: [facility];  Service: General;  Laterality: N/A;    HERNIA REPAIR           Family History:  Family History   Problem Relation Age of Onset    Diabetes Mother     Hypertension Father     Heart disease Maternal Grandmother        Social History:  Social History     Social History Main Topics    Smoking status: Never Smoker    Smokeless tobacco: Never Used    Alcohol use No    Drug use: No    Sexual activity: No       ROS   Review of  "Systems   Constitutional: Negative for fever.   HENT: Positive for dental problem (2nd molar on lower left jaw). Negative for congestion, facial swelling, mouth sores, sore throat and trouble swallowing.    Respiratory: Negative for shortness of breath.    Cardiovascular: Negative for chest pain.   Gastrointestinal: Negative for abdominal pain, blood in stool, constipation, diarrhea, nausea and vomiting.   Genitourinary: Negative for decreased urine volume, difficulty urinating, dysuria, flank pain and hematuria.   Musculoskeletal: Negative for back pain.   Skin: Negative for rash.   Neurological: Negative for dizziness, weakness, light-headedness, numbness and headaches.   Hematological: Does not bruise/bleed easily.       Physical Exam      Initial Vitals [08/15/17 1512]   BP Pulse Resp Temp SpO2   (!) 170/93 78 18 98 °F (36.7 °C) 98 %      MAP       118.67          Physical Exam  Nursing Notes and Vital Signs Reviewed.  Constitutional: Patient is in no acute distress. Well-developed and well-nourished.  Head: Atraumatic. Normocephalic.  Eyes: PERRL. EOM intact. Conjunctivae are not pale. No scleral icterus.  ENT: Mucous membranes are moist. Oropharynx is clear and symmetric. No facial swelling. No induration of oral floor. Small dental samir to tooth #18.  Neck: Supple. Full ROM.   Cardiovascular: Regular rate. Distal pulses are 2+ and symmetric.  Pulmonary/Chest: No respiratory distress.   Musculoskeletal: Moves all extremities.   Skin: Warm and dry.  Neurological:  Alert, awake, and appropriate. Normal speech. No acute focal neurological deficits are appreciated.  Psychiatric: Normal affect. Good eye contact. Appropriate in content.    ED Course    Procedures  ED Vital Signs:  Vitals:    08/15/17 1512   BP: (!) 170/93   Pulse: 78   Resp: 18   Temp: 98 °F (36.7 °C)   TempSrc: Oral   SpO2: 98%   Weight: 72.6 kg (160 lb)   Height: 5' 4" (1.626 m)              The Emergency Provider reviewed the vital signs and " test results, which are outlined above.    ED Discussion     4:44 PM:  Discussed with pt all pertinent ED information and plan of tx. Gave pt all f/u and return to the ED instructions. All questions and concerns were addressed at this time. Pt expresses understanding of information and instructions, and is comfortable with plan to discharge. Pt is stable for discharge.    Pre-hypertension/Hypertension: The pt has been informed that they may have pre-hypertension or hypertension based on a blood pressure reading in the ED. I recommend that the pt call the PCP listed on their discharge instructions or a physician of their choice this week to arrange f/u for further evaluation of possible pre-hypertension or hypertension.     ED Medication(s):  Medications - No data to display    New Prescriptions    DICLOFENAC (VOLTAREN) 50 MG EC TABLET    Take 1 tablet (50 mg total) by mouth 3 (three) times daily as needed (PAIN).       Follow-up Information     [facility]    Specialty:  Emergency Medicine  Why:  If symptoms worsen in any way. Follow up with the dentist of your choice within the next 1-2 days.  Contact information:  [address] [facility] [address] [phone]                   Medical Decision Making              Scribe Attestation:   Scribe #1: I performed the above scribed service and the documentation accurately describes the services I performed. I attest to the accuracy of the note.    Attending:   Physician Attestation Statement for Scribe #1: I, SAMANTA Vora, personally performed the services described in this documentation, as scribed by Conchita Garcia, in my presence, and it is both accurate and complete.          Clinical Impression       ICD-10-CM ICD-9-CM   1. Dentalgia K08.89 525.9   2. Dental caries K02.9 521.00   3. Elevated blood pressure reading R03.0 796.2       Disposition:   Disposition: Discharged  Condition: Stable         Mook Stevens  JUMANA  08/15/17 6303

## 2017-08-17 ENCOUNTER — HOSPITAL ENCOUNTER (EMERGENCY)
Facility: HOSPITAL | Age: 32
Discharge: HOME | End: 2017-08-18
Attending: EMERGENCY MEDICINE
Payer: COMMERCIAL

## 2017-08-17 VITALS
TEMPERATURE: 98 F | BODY MASS INDEX: 27.31 KG/M2 | OXYGEN SATURATION: 98 % | SYSTOLIC BLOOD PRESSURE: 172 MMHG | WEIGHT: 160 LBS | DIASTOLIC BLOOD PRESSURE: 107 MMHG | HEIGHT: 64 IN | HEART RATE: 76 BPM | RESPIRATION RATE: 18 BRPM

## 2017-08-17 DIAGNOSIS — K08.89 PAIN, DENTAL: Primary | ICD-10-CM

## 2017-08-17 PROCEDURE — 99283 EMERGENCY DEPT VISIT LOW MDM: CPT

## 2017-08-17 RX ORDER — CLINDAMYCIN HYDROCHLORIDE 150 MG/1
300 CAPSULE ORAL EVERY 8 HOURS
Qty: 42 CAPSULE | Refills: 0 | Status: SHIPPED | OUTPATIENT
Start: 2017-08-17 | End: 2017-08-24

## 2017-08-18 NOTE — ED PROVIDER NOTES
SCRIBE #1 NOTE: I, Kareen Porras, am scribing for, and in the presence of, Alfredo Gallegos MD. I have scribed the entire note.      History      Chief Complaint   Patient presents with    Dental Pain       Review of patient's allergies indicates:   Allergen Reactions    Pcn [penicillins] Rash        HPI   HPI    8/17/2017, 10:21 PM   History obtained from the patient      History of Present Illness: Peewee Soto Jr. is a 32 y.o. male patient who presents to the Emergency Department for dental pain which onset gradually over a month ago. Pt states that he needs to get tooth pulled but cannot afford it at this time. Pt states that he has a dentist appointment next week. States that he was seen here yesterday for same sxs but could not afford pain Rx.  Pt is requesting ABx. Symptoms are constant and moderate in severity.  No mitigating or exacerbating factors reported. Associated sxs include sore throat. Patient denies any fever, chills, difficulty swallowing, SOB, drooling, jaw pain, n/v, and all other sxs at this time. Prior Tx includes Tylenol with no relief. No further complaints or concerns at this time.         Arrival mode: Personal vehicle      PCP: Primary Doctor No       Past Medical History:  Past Medical History:   Diagnosis Date    Acid reflux     Undescended testicle before puberty        Past Surgical History:  Past Surgical History:   Procedure Laterality Date    APPENDECTOMY N/A 11/11/2015    Procedure: APPENDECTOMY ;  Surgeon: Osvaldo Guy MD;  Location: [facility];  Service: General;  Laterality: N/A;    HERNIA REPAIR           Family History:  Family History   Problem Relation Age of Onset    Diabetes Mother     Hypertension Father     Heart disease Maternal Grandmother        Social History:  Social History     Social History Main Topics    Smoking status: Never Smoker    Smokeless tobacco: Never Used    Alcohol use No    Drug use: No    Sexual activity: No       ROS   Review of  Systems   Constitutional: Negative for chills and fever.   HENT: Positive for dental problem and sore throat. Negative for drooling and trouble swallowing.         - jaw pain    Respiratory: Negative for shortness of breath.    Cardiovascular: Negative for chest pain.   Gastrointestinal: Negative for nausea and vomiting.   Genitourinary: Negative for dysuria.   Musculoskeletal: Negative for back pain.   Skin: Negative for rash.   Neurological: Negative for weakness.   Hematological: Does not bruise/bleed easily.       Physical Exam      Initial Vitals [08/17/17 2155]   BP Pulse Resp Temp SpO2   (!) 172/107 76 18 98.4 °F (36.9 °C) 98 %      MAP       128.67          Physical Exam  Nursing Notes and Vital Signs Reviewed.  Constitutional: Patient is in no apparent distress. Well-developed and well-nourished.  Head: Atraumatic. Normocephalic.  Eyes: PERRL. EOM intact. Conjunctivae are not pale. No scleral icterus.  ENT: Mucous membranes are moist. Oropharynx is clear and symmetric. R lower second molar dental aguilar.   Neck: Supple. Full ROM. No lymphadenopathy.  Cardiovascular: Regular rate. Regular rhythm. No murmurs, rubs, or gallops. Distal pulses are 2+ and symmetric.  Pulmonary/Chest: No respiratory distress. Clear to auscultation bilaterally. No wheezing, rales, or rhonchi.  Abdominal: Soft and non-distended.  There is no tenderness.  No rebound, guarding, or rigidity. Good bowel sounds.  Genitourinary: No CVA tenderness  Musculoskeletal: Moves all extremities. No obvious deformities. No edema. No calf tenderness.  Skin: Warm and dry.  Neurological:  Alert, awake, and appropriate.  Normal speech.  No acute focal neurological deficits are appreciated.  Psychiatric: Normal affect. Good eye contact. Appropriate in content.    ED Course    Procedures  ED Vital Signs:  Vitals:    08/17/17 2155   BP: (!) 172/107   Pulse: 76   Resp: 18   Temp: 98.4 °F (36.9 °C)   TempSrc: Oral   SpO2: 98%   Weight: 72.6 kg (160 lb)  "  Height: 5' 4" (1.626 m)                   The Emergency Provider reviewed the vital signs and test results, which are outlined above.    ED Discussion   Pre-hypertension/Hypertension: The pt has been informed that they may have pre-hypertension or hypertension based on a blood pressure reading in the ED. I recommend that the pt call the PCP listed on their discharge instructions or a physician of their choice this week to arrange f/u for further evaluation of possible pre-hypertension or hypertension.     10:26 PM:  Discussed with pt all pertinent ED information and results. Discussed pt dx and plan of tx. Gave pt all f/u and return to the ED instructions. All questions and concerns were addressed at this time. Pt expresses understanding of information and instructions, and is comfortable with plan to discharge. Pt is stable for discharge.          ED Medication(s):  Medications - No data to display    Discharge Medication List as of 8/17/2017 10:26 PM      START taking these medications    Details   clindamycin (CLEOCIN) 150 MG capsule Take 2 capsules (300 mg total) by mouth every 8 (eight) hours., Starting Thu 8/17/2017, Until Thu 8/24/2017, Print             Follow-up Information     your dentist.           [facility]    Specialty:  Emergency Medicine  Why:  As needed  Contact information:  [address] [facility] [address] [phone]                   Medical Decision Making              Scribe Attestation:   Scribe #1: I performed the above scribed service and the documentation accurately describes the services I performed. I attest to the accuracy of the note.    Attending:   Physician Attestation Statement for Scribe #1: I, Alfredo Gallegos MD, personally performed the services described in this documentation, as scribed by Kareen Porras, in my presence, and it is both accurate and complete.          Clinical Impression       ICD-10-CM ICD-9-CM   1. Pain, dental " K08.89 525.9       Disposition:   Disposition: Discharged  Condition: Stable         Alfredo Gallegos MD  08/18/17 0542

## 2017-09-30 ENCOUNTER — HOSPITAL ENCOUNTER (EMERGENCY)
Facility: HOSPITAL | Age: 32
Discharge: HOME | End: 2017-09-30
Attending: EMERGENCY MEDICINE
Payer: COMMERCIAL

## 2017-09-30 VITALS
OXYGEN SATURATION: 98 % | HEIGHT: 64 IN | TEMPERATURE: 99 F | BODY MASS INDEX: 27.31 KG/M2 | RESPIRATION RATE: 16 BRPM | SYSTOLIC BLOOD PRESSURE: 168 MMHG | DIASTOLIC BLOOD PRESSURE: 105 MMHG | HEART RATE: 92 BPM | WEIGHT: 160 LBS

## 2017-09-30 DIAGNOSIS — K02.9 DENTAL CARIES: Primary | ICD-10-CM

## 2017-09-30 PROCEDURE — 99283 EMERGENCY DEPT VISIT LOW MDM: CPT

## 2017-09-30 RX ORDER — KETOROLAC TROMETHAMINE 10 MG/1
10 TABLET, FILM COATED ORAL EVERY 6 HOURS
Qty: 14 TABLET | Refills: 0 | Status: SHIPPED | OUTPATIENT
Start: 2017-09-30 | End: 2019-03-18

## 2017-09-30 RX ORDER — TRAMADOL HYDROCHLORIDE 50 MG/1
50 TABLET ORAL EVERY 6 HOURS PRN
Qty: 14 TABLET | Refills: 0 | Status: SHIPPED | OUTPATIENT
Start: 2017-09-30 | End: 2017-10-10

## 2017-09-30 RX ORDER — CLINDAMYCIN HYDROCHLORIDE 300 MG/1
300 CAPSULE ORAL 4 TIMES DAILY
Qty: 28 CAPSULE | Refills: 0 | Status: SHIPPED | OUTPATIENT
Start: 2017-09-30 | End: 2017-10-07

## 2017-09-30 NOTE — ED PROVIDER NOTES
Encounter Date: 9/30/2017       History     Chief Complaint   Patient presents with    Dental Pain     left lower tooth pain x 3 months     32 year old male with complaint of left lower tooth ache X 3 months with worsening X 2 days.  Constant aching pain.  Worse with eating and drinking.  No radiation of pain. No alleviating factors.           Review of patient's allergies indicates:   Allergen Reactions    Pcn [penicillins] Rash     Past Medical History:   Diagnosis Date    Acid reflux     Undescended testicle before puberty      Past Surgical History:   Procedure Laterality Date    APPENDECTOMY N/A 11/11/2015    Procedure: APPENDECTOMY ;  Surgeon: Osvaldo Guy MD;  Location: [facility];  Service: General;  Laterality: N/A;    HERNIA REPAIR       Family History   Problem Relation Age of Onset    Diabetes Mother     Hypertension Father     Heart disease Maternal Grandmother      Social History   Substance Use Topics    Smoking status: Never Smoker    Smokeless tobacco: Never Used    Alcohol use No     Review of Systems   Constitutional: Negative for fever.   HENT: Negative for sore throat.         Left lower tooth ache   Respiratory: Negative for shortness of breath.    Cardiovascular: Negative for chest pain.   Gastrointestinal: Negative for nausea.   Genitourinary: Negative for dysuria.   Musculoskeletal: Negative for back pain.   Skin: Negative for rash.   Neurological: Negative for weakness.   Hematological: Does not bruise/bleed easily.       Physical Exam     Initial Vitals [09/30/17 1051]   BP Pulse Resp Temp SpO2   (!) 168/105 92 16 98.5 °F (36.9 °C) 98 %      MAP       126         Physical Exam    Nursing note and vitals reviewed.  Constitutional: He appears well-developed and well-nourished.   HENT:   Head: Normocephalic and atraumatic.   Left bottom molar #2 tenderness, no facial swelling, no swelling around tooth   Eyes: Conjunctivae are normal. Pupils are equal, round, and reactive to light.    Neck: Normal range of motion. Neck supple.   Cardiovascular: Normal rate, regular rhythm, normal heart sounds and intact distal pulses.   Pulmonary/Chest: Breath sounds normal.   Abdominal: Soft. There is no rebound and no guarding.   Musculoskeletal: Normal range of motion.   Neurological: He is alert.   Skin: Skin is warm and dry.   Psychiatric: He has a normal mood and affect. His behavior is normal. Thought content normal.         ED Course   Procedures  Labs Reviewed - No data to display                            ED Course      Clinical Impression:   The encounter diagnosis was Dental caries.                           Bola Loco NP  09/30/17 1058

## 2019-03-18 ENCOUNTER — HOSPITAL ENCOUNTER (INPATIENT)
Facility: HOSPITAL | Age: 34
LOS: 1 days | Discharge: HOME | DRG: 247 | End: 2019-03-19
Attending: EMERGENCY MEDICINE | Admitting: HOSPITALIST
Payer: COMMERCIAL

## 2019-03-18 DIAGNOSIS — K35.30 ACUTE APPENDICITIS WITH LOCALIZED PERITONITIS, WITHOUT PERFORATION OR GANGRENE: ICD-10-CM

## 2019-03-18 DIAGNOSIS — I21.4 NSTEMI (NON-ST ELEVATED MYOCARDIAL INFARCTION): ICD-10-CM

## 2019-03-18 DIAGNOSIS — R07.9 CHEST PAIN: Primary | ICD-10-CM

## 2019-03-18 DIAGNOSIS — E11.9 TYPE 2 DIABETES MELLITUS WITHOUT COMPLICATION, WITHOUT LONG-TERM CURRENT USE OF INSULIN: ICD-10-CM

## 2019-03-18 LAB
ALBUMIN SERPL BCP-MCNC: 4.1 G/DL
ALP SERPL-CCNC: 86 U/L
ALT SERPL W/O P-5'-P-CCNC: 85 U/L
AMPHET+METHAMPHET UR QL: NEGATIVE
ANION GAP SERPL CALC-SCNC: 12 MMOL/L
APTT BLDCRRT: 27.3 SEC
AST SERPL-CCNC: 28 U/L
BARBITURATES UR QL SCN>200 NG/ML: NEGATIVE
BASOPHILS # BLD AUTO: 0.03 K/UL
BASOPHILS NFR BLD: 0.3 %
BENZODIAZ UR QL SCN>200 NG/ML: NEGATIVE
BILIRUB SERPL-MCNC: 0.2 MG/DL
BILIRUB UR QL STRIP: NEGATIVE
BNP SERPL-MCNC: <10 PG/ML
BUN SERPL-MCNC: 21 MG/DL
BZE UR QL SCN: NEGATIVE
CALCIUM SERPL-MCNC: 9.5 MG/DL
CANNABINOIDS UR QL SCN: NEGATIVE
CHLORIDE SERPL-SCNC: 101 MMOL/L
CHOLEST SERPL-MCNC: 196 MG/DL
CHOLEST/HDLC SERPL: 4.7 {RATIO}
CK SERPL-CCNC: 105 U/L
CLARITY UR: CLEAR
CO2 SERPL-SCNC: 26 MMOL/L
COLOR UR: YELLOW
CREAT SERPL-MCNC: 1.1 MG/DL
CREAT UR-MCNC: 198.8 MG/DL
DIASTOLIC DYSFUNCTION: NO
DIFFERENTIAL METHOD: ABNORMAL
EOSINOPHIL # BLD AUTO: 0.1 K/UL
EOSINOPHIL NFR BLD: 1.2 %
ERYTHROCYTE [DISTWIDTH] IN BLOOD BY AUTOMATED COUNT: 12.1 %
EST. GFR  (AFRICAN AMERICAN): >60 ML/MIN/1.73 M^2
EST. GFR  (NON AFRICAN AMERICAN): >60 ML/MIN/1.73 M^2
ESTIMATED AVG GLUCOSE: 192 MG/DL
GLUCOSE SERPL-MCNC: 189 MG/DL
GLUCOSE UR QL STRIP: ABNORMAL
HBA1C MFR BLD HPLC: 8.3 %
HCT VFR BLD AUTO: 47.3 %
HDLC SERPL-MCNC: 42 MG/DL
HDLC SERPL: 21.4 %
HGB BLD-MCNC: 16.1 G/DL
HGB UR QL STRIP: NEGATIVE
INR PPP: 0.9
KETONES UR QL STRIP: ABNORMAL
LDLC SERPL CALC-MCNC: 101.8 MG/DL
LEUKOCYTE ESTERASE UR QL STRIP: NEGATIVE
LYMPHOCYTES # BLD AUTO: 3.5 K/UL
LYMPHOCYTES NFR BLD: 31.6 %
MCH RBC QN AUTO: 32.2 PG
MCHC RBC AUTO-ENTMCNC: 34 G/DL
MCV RBC AUTO: 95 FL
METHADONE UR QL SCN>300 NG/ML: NEGATIVE
MONOCYTES # BLD AUTO: 0.9 K/UL
MONOCYTES NFR BLD: 8 %
NEUTROPHILS # BLD AUTO: 6.5 K/UL
NEUTROPHILS NFR BLD: 58.9 %
NITRITE UR QL STRIP: NEGATIVE
NONHDLC SERPL-MCNC: 154 MG/DL
OPIATES UR QL SCN: NEGATIVE
PCP UR QL SCN>25 NG/ML: NEGATIVE
PH UR STRIP: 7 [PH] (ref 5–8)
PLATELET # BLD AUTO: 316 K/UL
PMV BLD AUTO: 9.3 FL
POCT GLUCOSE: 155 MG/DL (ref 70–110)
POCT GLUCOSE: 164 MG/DL (ref 70–110)
POCT GLUCOSE: 182 MG/DL (ref 70–110)
POCT GLUCOSE: 203 MG/DL (ref 70–110)
POCT GLUCOSE: 215 MG/DL (ref 70–110)
POTASSIUM SERPL-SCNC: 3.8 MMOL/L
PROT SERPL-MCNC: 7.3 G/DL
PROT UR QL STRIP: ABNORMAL
PROTHROMBIN TIME: 10.1 SEC
RBC # BLD AUTO: 5 M/UL
RETIRED EF AND QEF - SEE NOTES: 55 (ref 55–65)
SODIUM SERPL-SCNC: 139 MMOL/L
SP GR UR STRIP: 1.02 (ref 1–1.03)
TOXICOLOGY INFORMATION: NORMAL
TRIGL SERPL-MCNC: 261 MG/DL
TROPONIN I SERPL DL<=0.01 NG/ML-MCNC: 0.02 NG/ML
TROPONIN I SERPL DL<=0.01 NG/ML-MCNC: 0.07 NG/ML
TROPONIN I SERPL DL<=0.01 NG/ML-MCNC: 10.91 NG/ML
TROPONIN I SERPL DL<=0.01 NG/ML-MCNC: 2.08 NG/ML
URN SPEC COLLECT METH UR: ABNORMAL
UROBILINOGEN UR STRIP-ACNC: NEGATIVE EU/DL
WBC # BLD AUTO: 11.1 K/UL

## 2019-03-18 PROCEDURE — 93458 L HRT ARTERY/VENTRICLE ANGIO: CPT | Mod: 26,59,, | Performed by: INTERNAL MEDICINE

## 2019-03-18 PROCEDURE — 93010 ELECTROCARDIOGRAM REPORT: CPT | Mod: 59,,, | Performed by: INTERNAL MEDICINE

## 2019-03-18 PROCEDURE — 96375 TX/PRO/DX INJ NEW DRUG ADDON: CPT | Performed by: EMERGENCY MEDICINE

## 2019-03-18 PROCEDURE — 63600175 PHARM REV CODE 636 W HCPCS: Performed by: EMERGENCY MEDICINE

## 2019-03-18 PROCEDURE — 82550 ASSAY OF CK (CPK): CPT

## 2019-03-18 PROCEDURE — 96372 THER/PROPH/DIAG INJ SC/IM: CPT | Mod: 59

## 2019-03-18 PROCEDURE — 25000003 PHARM REV CODE 250

## 2019-03-18 PROCEDURE — 85025 COMPLETE CBC W/AUTO DIFF WBC: CPT

## 2019-03-18 PROCEDURE — 63600175 PHARM REV CODE 636 W HCPCS: Mod: JG

## 2019-03-18 PROCEDURE — 25000003 PHARM REV CODE 250: Performed by: EMERGENCY MEDICINE

## 2019-03-18 PROCEDURE — 93458 CATH LAB PROCEDURE: ICD-10-PCS | Mod: 26,59,, | Performed by: INTERNAL MEDICINE

## 2019-03-18 PROCEDURE — 93010 ELECTROCARDIOGRAM REPORT: CPT | Mod: 76,59,, | Performed by: INTERNAL MEDICINE

## 2019-03-18 PROCEDURE — 99223 1ST HOSP IP/OBS HIGH 75: CPT | Mod: 25,,, | Performed by: INTERNAL MEDICINE

## 2019-03-18 PROCEDURE — 80053 COMPREHEN METABOLIC PANEL: CPT

## 2019-03-18 PROCEDURE — 93005 ELECTROCARDIOGRAM TRACING: CPT

## 2019-03-18 PROCEDURE — 85610 PROTHROMBIN TIME: CPT

## 2019-03-18 PROCEDURE — 80307 DRUG TEST PRSMV CHEM ANLYZR: CPT

## 2019-03-18 PROCEDURE — 92928 CATH LAB PROCEDURE: ICD-10-PCS | Mod: LC,,, | Performed by: INTERNAL MEDICINE

## 2019-03-18 PROCEDURE — 36415 COLL VENOUS BLD VENIPUNCTURE: CPT

## 2019-03-18 PROCEDURE — 92929 CATH LAB PROCEDURE: ICD-10-PCS | Mod: LC,,, | Performed by: INTERNAL MEDICINE

## 2019-03-18 PROCEDURE — 82962 GLUCOSE BLOOD TEST: CPT

## 2019-03-18 PROCEDURE — 99291 CRITICAL CARE FIRST HOUR: CPT | Mod: 25

## 2019-03-18 PROCEDURE — 21400001 HC TELEMETRY ROOM

## 2019-03-18 PROCEDURE — 84484 ASSAY OF TROPONIN QUANT: CPT | Mod: 91

## 2019-03-18 PROCEDURE — C1769 GUIDE WIRE: HCPCS

## 2019-03-18 PROCEDURE — 96372 THER/PROPH/DIAG INJ SC/IM: CPT | Mod: 59 | Performed by: EMERGENCY MEDICINE

## 2019-03-18 PROCEDURE — 80061 LIPID PANEL: CPT

## 2019-03-18 PROCEDURE — 25000003 PHARM REV CODE 250: Performed by: NURSE PRACTITIONER

## 2019-03-18 PROCEDURE — 25500020 PHARM REV CODE 255

## 2019-03-18 PROCEDURE — 85730 THROMBOPLASTIN TIME PARTIAL: CPT

## 2019-03-18 PROCEDURE — 83036 HEMOGLOBIN GLYCOSYLATED A1C: CPT

## 2019-03-18 PROCEDURE — 93306 TTE W/DOPPLER COMPLETE: CPT

## 2019-03-18 PROCEDURE — 92929 CATH LAB PROCEDURE: CPT | Mod: LC,,, | Performed by: INTERNAL MEDICINE

## 2019-03-18 PROCEDURE — 81003 URINALYSIS AUTO W/O SCOPE: CPT | Mod: 59

## 2019-03-18 PROCEDURE — 93306 2D ECHO WITH COLOR FLOW DOPPLER: ICD-10-PCS | Mod: 26,,, | Performed by: INTERNAL MEDICINE

## 2019-03-18 PROCEDURE — 99152 CATH LAB PROCEDURE: ICD-10-PCS | Mod: ,,, | Performed by: INTERNAL MEDICINE

## 2019-03-18 PROCEDURE — 63600175 PHARM REV CODE 636 W HCPCS: Performed by: NURSE PRACTITIONER

## 2019-03-18 PROCEDURE — 96361 HYDRATE IV INFUSION ADD-ON: CPT | Performed by: EMERGENCY MEDICINE

## 2019-03-18 PROCEDURE — 92928 PRQ TCAT PLMT NTRAC ST 1 LES: CPT | Mod: LC,,, | Performed by: INTERNAL MEDICINE

## 2019-03-18 PROCEDURE — 93010 EKG 12-LEAD: ICD-10-PCS | Mod: 59,,, | Performed by: INTERNAL MEDICINE

## 2019-03-18 PROCEDURE — 25000003 PHARM REV CODE 250: Performed by: INTERNAL MEDICINE

## 2019-03-18 PROCEDURE — 93306 TTE W/DOPPLER COMPLETE: CPT | Mod: 26,,, | Performed by: INTERNAL MEDICINE

## 2019-03-18 PROCEDURE — 96374 THER/PROPH/DIAG INJ IV PUSH: CPT

## 2019-03-18 PROCEDURE — 83880 ASSAY OF NATRIURETIC PEPTIDE: CPT

## 2019-03-18 PROCEDURE — 99223 PR INITIAL HOSPITAL CARE,LEVL III: ICD-10-PCS | Mod: 25,,, | Performed by: INTERNAL MEDICINE

## 2019-03-18 PROCEDURE — 99152 MOD SED SAME PHYS/QHP 5/>YRS: CPT | Mod: ,,, | Performed by: INTERNAL MEDICINE

## 2019-03-18 RX ORDER — METOPROLOL TARTRATE 1 MG/ML
5 INJECTION, SOLUTION INTRAVENOUS
Status: COMPLETED | OUTPATIENT
Start: 2019-03-18 | End: 2019-03-18

## 2019-03-18 RX ORDER — TIROFIBAN HYDROCHLORIDE 50 UG/ML
25 INJECTION INTRAVENOUS ONCE
Status: DISCONTINUED | OUTPATIENT
Start: 2019-03-18 | End: 2019-03-19 | Stop reason: HOSPADM

## 2019-03-18 RX ORDER — RANOLAZINE 500 MG/1
500 TABLET, EXTENDED RELEASE ORAL 2 TIMES DAILY
Status: DISCONTINUED | OUTPATIENT
Start: 2019-03-18 | End: 2019-03-19 | Stop reason: HOSPADM

## 2019-03-18 RX ORDER — TIROFIBAN HYDROCHLORIDE 50 UG/ML
0.15 INJECTION INTRAVENOUS CONTINUOUS
Status: DISCONTINUED | OUTPATIENT
Start: 2019-03-18 | End: 2019-03-19 | Stop reason: HOSPADM

## 2019-03-18 RX ORDER — CARVEDILOL 3.12 MG/1
3.12 TABLET ORAL 2 TIMES DAILY
Status: DISCONTINUED | OUTPATIENT
Start: 2019-03-18 | End: 2019-03-18

## 2019-03-18 RX ORDER — CARVEDILOL 6.25 MG/1
6.25 TABLET ORAL 2 TIMES DAILY
Status: DISCONTINUED | OUTPATIENT
Start: 2019-03-18 | End: 2019-03-19 | Stop reason: HOSPADM

## 2019-03-18 RX ORDER — GLUCAGON 1 MG
1 KIT INJECTION
Status: DISCONTINUED | OUTPATIENT
Start: 2019-03-18 | End: 2019-03-19 | Stop reason: HOSPADM

## 2019-03-18 RX ORDER — ASPIRIN 325 MG
325 TABLET ORAL
Status: COMPLETED | OUTPATIENT
Start: 2019-03-18 | End: 2019-03-18

## 2019-03-18 RX ORDER — SODIUM CHLORIDE 9 MG/ML
INJECTION, SOLUTION INTRAVENOUS CONTINUOUS
Status: DISCONTINUED | OUTPATIENT
Start: 2019-03-18 | End: 2019-03-18

## 2019-03-18 RX ORDER — IBUPROFEN 200 MG
24 TABLET ORAL
Status: DISCONTINUED | OUTPATIENT
Start: 2019-03-18 | End: 2019-03-19 | Stop reason: HOSPADM

## 2019-03-18 RX ORDER — INSULIN ASPART 100 [IU]/ML
1-10 INJECTION, SOLUTION INTRAVENOUS; SUBCUTANEOUS
Status: DISCONTINUED | OUTPATIENT
Start: 2019-03-18 | End: 2019-03-19 | Stop reason: HOSPADM

## 2019-03-18 RX ORDER — LISINOPRIL 10 MG/1
10 TABLET ORAL DAILY
Status: DISCONTINUED | OUTPATIENT
Start: 2019-03-18 | End: 2019-03-19 | Stop reason: HOSPADM

## 2019-03-18 RX ORDER — HEPARIN SODIUM,PORCINE/D5W 25000/250
12 INTRAVENOUS SOLUTION INTRAVENOUS CONTINUOUS
Status: DISCONTINUED | OUTPATIENT
Start: 2019-03-18 | End: 2019-03-18

## 2019-03-18 RX ORDER — SODIUM CHLORIDE 0.9 % (FLUSH) 0.9 %
5 SYRINGE (ML) INJECTION
Status: DISCONTINUED | OUTPATIENT
Start: 2019-03-18 | End: 2019-03-19 | Stop reason: HOSPADM

## 2019-03-18 RX ORDER — ATORVASTATIN CALCIUM 40 MG/1
40 TABLET, FILM COATED ORAL DAILY
Status: DISCONTINUED | OUTPATIENT
Start: 2019-03-18 | End: 2019-03-19 | Stop reason: HOSPADM

## 2019-03-18 RX ORDER — IBUPROFEN 200 MG
16 TABLET ORAL
Status: DISCONTINUED | OUTPATIENT
Start: 2019-03-18 | End: 2019-03-19 | Stop reason: HOSPADM

## 2019-03-18 RX ORDER — NAPROXEN SODIUM 220 MG/1
81 TABLET, FILM COATED ORAL DAILY
Status: DISCONTINUED | OUTPATIENT
Start: 2019-03-19 | End: 2019-03-19 | Stop reason: HOSPADM

## 2019-03-18 RX ORDER — SODIUM CHLORIDE 9 MG/ML
INJECTION, SOLUTION INTRAVENOUS CONTINUOUS
Status: ACTIVE | OUTPATIENT
Start: 2019-03-18 | End: 2019-03-19

## 2019-03-18 RX ADMIN — HEPARIN SODIUM AND DEXTROSE 12 UNITS/KG/HR: 10000; 5 INJECTION INTRAVENOUS at 01:03

## 2019-03-18 RX ADMIN — INSULIN ASPART 2 UNITS: 100 INJECTION, SOLUTION INTRAVENOUS; SUBCUTANEOUS at 06:03

## 2019-03-18 RX ADMIN — ATORVASTATIN CALCIUM 40 MG: 40 TABLET, FILM COATED ORAL at 06:03

## 2019-03-18 RX ADMIN — INSULIN ASPART 4 UNITS: 100 INJECTION, SOLUTION INTRAVENOUS; SUBCUTANEOUS at 06:03

## 2019-03-18 RX ADMIN — NITROGLYCERIN 1 INCH: 20 OINTMENT TOPICAL at 06:03

## 2019-03-18 RX ADMIN — DICYCLOMINE HYDROCHLORIDE 50 ML: 10 SOLUTION ORAL at 04:03

## 2019-03-18 RX ADMIN — SODIUM CHLORIDE: 0.9 INJECTION, SOLUTION INTRAVENOUS at 06:03

## 2019-03-18 RX ADMIN — LISINOPRIL 10 MG: 10 TABLET ORAL at 06:03

## 2019-03-18 RX ADMIN — SODIUM CHLORIDE: 0.9 INJECTION, SOLUTION INTRAVENOUS at 11:03

## 2019-03-18 RX ADMIN — ASPIRIN 325 MG ORAL TABLET 325 MG: 325 PILL ORAL at 06:03

## 2019-03-18 RX ADMIN — RANOLAZINE 500 MG: 500 TABLET, FILM COATED, EXTENDED RELEASE ORAL at 08:03

## 2019-03-18 RX ADMIN — INSULIN ASPART 4 UNITS: 100 INJECTION, SOLUTION INTRAVENOUS; SUBCUTANEOUS at 11:03

## 2019-03-18 RX ADMIN — INSULIN ASPART 1 UNITS: 100 INJECTION, SOLUTION INTRAVENOUS; SUBCUTANEOUS at 11:03

## 2019-03-18 RX ADMIN — CARVEDILOL 6.25 MG: 6.25 TABLET, FILM COATED ORAL at 08:03

## 2019-03-18 RX ADMIN — METOPROLOL TARTRATE 5 MG: 5 INJECTION INTRAVENOUS at 06:03

## 2019-03-18 RX ADMIN — ASPIRIN 325 MG ORAL TABLET 325 MG: 325 PILL ORAL at 02:03

## 2019-03-18 RX ADMIN — SODIUM CHLORIDE: 0.9 INJECTION, SOLUTION INTRAVENOUS at 02:03

## 2019-03-18 NOTE — HPI
32 y/o male with PMHx of DM and GERD that presented to the ED with c/o chest pain that onset gradually last night. The pain is described as pressure, that radiated to the L shoulder, that worsened as he slept, and resolved spontaneously after arrival to the ED. Associated symptoms include emesis without nausea, and diaphoresis. Symptoms are intermittent and moderate in nature. No exacerbating or mitigating factors reported. Pt denies fever, chills, SOB, palpitations, leg edema, headache, nausea, diarrhea, dizziness, and all other symptoms at this time. He is a full code and his surrogate decision maker is his mother, Amanda Daly.

## 2019-03-18 NOTE — ASSESSMENT & PLAN NOTE
-Patient who presents with substernal chest tightness/pressure radiating to his jaw and left arm along with elevated troponin  -Presentation consistent with NSTEMI  -Continue ASA, Coreg, ACEi, statin  -Start heparin gtt   -2D echo pending  -Check lipid panel  -[facility] today by Dr. Moreno. All risks, benefits, and treatment alternatives explained to patient in detail. All questions answered. He has agreed to proceed.

## 2019-03-18 NOTE — H&P
[facility] Hospital Medicine  History & Physical    Patient Name: Peewee Soto Jr.  MRN: [id number]  Admission Date: 3/18/2019  Attending Physician: Marisela Richard MD   Primary Care Provider: Primary Doctor No         Patient information was obtained from patient, past medical records and ER records.     Subjective:     Principal Problem:Chest pain    Chief Complaint:   Chief Complaint   Patient presents with    Chest Pain     patient c/o midsternal chest pain since yesterday, vomited x1        HPI: 34 y/o male with PMHx of DM and GERD that presented to the ED with c/o chest pain that onset gradually last night. The pain is described as pressure, that radiated to the L shoulder, that worsened as he slept, and resolved spontaneously after arrival to the ED. Associated symptoms include emesis without nausea, and diaphoresis. Symptoms are intermittent and moderate in nature. No exacerbating or mitigating factors reported. Pt denies fever, chills, SOB, palpitations, leg edema, headache, nausea, diarrhea, dizziness, and all other symptoms at this time.  He is a full code and his surrogate decision maker is his mother, Amanda Daly.    Past Medical History:   Diagnosis Date    Acid reflux     Undescended testicle before puberty        Past Surgical History:   Procedure Laterality Date    APPENDECTOMY N/A 11/11/2015    Performed by [facility] at [facility]    HERNIA REPAIR         Review of patient's allergies indicates:   Allergen Reactions    Pcn [penicillins] Rash       No current facility-administered medications on file prior to encounter.      Current Outpatient Medications on File Prior to Encounter   Medication Sig    esomeprazole (NEXIUM) 20 MG capsule Take 20 mg by mouth before breakfast.    metformin (GLUCOPHAGE) 500 MG tablet Take 1 tablet (500 mg total) by mouth 2 (two) times daily with meals.    [DISCONTINUED] acetaminophen-codeine 300-30mg (TYLENOL-CODEINE #3)  300-30 mg Tab Take 1 tablet by mouth every 6 (six) hours as needed.    [DISCONTINUED] diclofenac (VOLTAREN) 50 MG EC tablet Take 1 tablet (50 mg total) by mouth 3 (three) times daily as needed (PAIN).    [DISCONTINUED] glycerin adult (FLEET GLYCERIN, ADULT,) suppository Place 1 suppository rectally as needed for Constipation.    [DISCONTINUED] ketorolac (TORADOL) 10 mg tablet Take 1 tablet (10 mg total) by mouth every 6 (six) hours.     Family History     Problem Relation (Age of Onset)    Diabetes Mother    Heart disease Maternal Grandmother    Hypertension Father        Tobacco Use    Smoking status: Never Smoker    Smokeless tobacco: Never Used   Substance and Sexual Activity    Alcohol use: No    Drug use: No    Sexual activity: No     Review of Systems   Constitutional: Positive for diaphoresis. Negative for activity change, appetite change, chills, fatigue and fever.   HENT: Negative for dental problem, ear pain, hearing loss, mouth sores, nosebleeds, sinus pressure, sore throat, tinnitus and trouble swallowing.    Eyes: Negative for pain, discharge and visual disturbance.   Respiratory: Negative for cough, choking, chest tightness, shortness of breath and wheezing.    Cardiovascular: Positive for chest pain. Negative for palpitations and leg swelling.   Gastrointestinal: Positive for vomiting. Negative for abdominal distention, abdominal pain, anal bleeding, blood in stool, constipation, diarrhea and nausea.   Endocrine: Negative for cold intolerance, heat intolerance, polydipsia, polyphagia and polyuria.   Genitourinary: Negative for decreased urine volume, difficulty urinating, flank pain, frequency, hematuria and urgency.   Musculoskeletal: Negative for arthralgias, back pain, gait problem, myalgias, neck pain and neck stiffness.   Skin: Negative for color change, rash and wound.   Allergic/Immunologic: Negative for food allergies and immunocompromised state.   Neurological: Negative for dizziness,  tremors, seizures, syncope, speech difficulty, weakness, light-headedness and headaches.   Hematological: Negative for adenopathy. Does not bruise/bleed easily.   Psychiatric/Behavioral: Negative for confusion and sleep disturbance. The patient is not nervous/anxious.    All other systems reviewed and are negative.    Objective:     Vital Signs (Most Recent):  Temp: 98.7 °F (37.1 °C) (03/18/19 0743)  Pulse: 77 (03/18/19 0743)  Resp: 20 (03/18/19 0743)  BP: 138/77 (03/18/19 0743)  SpO2: 95 % (03/18/19 0743) Vital Signs (24h Range):  Temp:  [98 °F (36.7 °C)-98.7 °F (37.1 °C)] 98.7 °F (37.1 °C)  Pulse:  [] 77  Resp:  [16-20] 20  SpO2:  [95 %-99 %] 95 %  BP: (138-188)/() 138/77     Weight: 72.6 kg (159 lb 15.1 oz)  Body mass index is 27.45 kg/m².    Physical Exam   Constitutional: He is oriented to person, place, and time. He appears well-developed and well-nourished. No distress.   HENT:   Head: Normocephalic and atraumatic.   Nose: Nose normal.   Eyes: Conjunctivae and EOM are normal. Pupils are equal, round, and reactive to light. Right eye exhibits no discharge. Left eye exhibits no discharge.   Neck: Normal range of motion. Neck supple. No JVD present. No tracheal deviation present. No thyromegaly present.   Cardiovascular: Normal rate, regular rhythm, normal heart sounds and intact distal pulses. Exam reveals no gallop and no friction rub.   No murmur heard.  Pulmonary/Chest: Effort normal and breath sounds normal. No respiratory distress. He has no wheezes. He has no rales. He exhibits no tenderness.   Abdominal: Soft. Bowel sounds are normal. He exhibits no distension and no mass. There is no tenderness.   Genitourinary:   Genitourinary Comments: deferred   Musculoskeletal: Normal range of motion. He exhibits no edema, tenderness or deformity.   Neurological: He is alert and oriented to person, place, and time. No cranial nerve deficit. He exhibits normal muscle tone. Coordination normal.   Skin:  Skin is warm and dry. Capillary refill takes less than 2 seconds. No rash noted. He is not diaphoretic. No erythema.   Psychiatric: He has a normal mood and affect. His behavior is normal.   Nursing note and vitals reviewed.        CRANIAL NERVES     CN III, IV, VI   Pupils are equal, round, and reactive to light.  Extraocular motions are normal.        Significant Labs:   Recent Lab Results       03/18/19  0616   03/18/19  0557   03/18/19  0446   03/18/19  0243   03/18/19  0237        Benzodiazepines   Negative           Methadone metabolites   Negative           Phencyclidine   Negative           Albumin         4.1     Alkaline Phosphatase         86     ALT         85     Amphetamine Screen, Ur   Negative           Anion Gap         12     Appearance, UA   Clear           AST         28     Barbiturate Screen, Ur   Negative           Baso #         0.03     Basophil%         0.3     Bilirubin (UA)   Negative           Total Bilirubin         0.2  Comment:  For infants and newborns, interpretation of results should be based  on gestational age, weight and in agreement with clinical  observations.  Premature Infant recommended reference ranges:  Up to 24 hours.............<8.0 mg/dL  Up to 48 hours............<12.0 mg/dL  3-5 days..................<15.0 mg/dL  6-29 days.................<15.0 mg/dL       BNP         <10  Comment:  Values of less than 100 pg/ml are consistent with non-CHF populations.     BUN, Bld         21     Calcium         9.5     Chloride         101     CO2         26     Cocaine (Metab.)   Negative           Color, UA   Yellow           CPK     105         Creatinine         1.1     Creatinine, Random Ur   198.8  Comment:  The random urine reference ranges provided were established   for 24 hour urine collections.  No reference ranges exist for  random urine specimens.  Correlate clinically.             Differential Method         Automated     eGFR if          >60     eGFR  if non          >60  Comment:  Calculation used to obtain the estimated glomerular filtration  rate (eGFR) is the CKD-EPI equation.        Eos #         0.1     Eosinophil%         1.2     Glucose         189     Glucose, UA   2+           Gran # (ANC)         6.5     Gran%         58.9     Hematocrit         47.3     Hemoglobin         16.1     Ketones, UA   Trace           Leukocytes, UA   Negative           Lymph #         3.5     Lymph%         31.6     MCH         32.2     MCHC         34.0     MCV         95     Mono #         0.9     Mono%         8.0     MPV         9.3     Nitrite, UA   Negative           Occult Blood UA   Negative           Opiate Scrn, Ur   Negative           pH, UA   7.0           Platelets         316     POCT Glucose 203     164       Potassium         3.8     Total Protein         7.3     Protein, UA   Trace  Comment:  Recommend a 24 hour urine protein or a urine   protein/creatinine ratio if globulin induced proteinuria is  clinically suspected.             RBC         5.00     RDW         12.1     Sodium         139     Specific [place], UA   1.020           Specimen UA   Urine, Clean Catch           Marijuana (THC) Metabolite   Negative           Toxicology Information   SEE COMMENT  Comment:  This screen includes the following classes of drugs at the   listed cut-off:  Benzodiazepines                  200 ng/ml  Methadone                        300 ng/ml  Cocaine metabolite               300 ng/ml  Opiates                          300 ng/ml  Barbiturates                     200 ng/ml  Amphetamines                    1000 ng/ml  Marijuana metabs (THC)            50 ng/ml  Phencyclidine (PCP)               25 ng/ml  High concentrations of Diphenhydramine may cross-react with  Phencyclidine PCP screening immunoassay giving a false   positive result.  High concentrations of Methylenedioxymethamphetamine (MDMA aka  Ectasy) and other structurally similar compounds may  cross-   react with the Amphetamine/Methamphetamine screening   immunoassay giving a false positive result.  A metabolite of the anti-HIV drug Sustiva () may cause  false positive results in the Marijuana metabolite (THC)   screening assay.  Note: This exception list includes only more common   interferants in toxicology screen testing.  Because of many   cross-reactantspositive results on toxicology drug screens   should be confirmed whenever results do not correlate with   clinical presentation.  This report is intended for use in clinical monitoring and  management of patients. It is not intended for use in   employment related drug testing.  Because of any cross-reactants, positive results on toxicology  drug screens should be confirmed whenever results do not  correlate with clinical presentation.  Presumptive positive results are unconfirmed and may be used   only for medical purposes.             Troponin I     0.068  Comment:  The reference interval for Troponin I represents the 99th percentile   cutoff   for our facility and is consistent with 3rd generation assay   performance.     0.017  Comment:  The reference interval for Troponin I represents the 99th percentile   cutoff   for our facility and is consistent with 3rd generation assay   performance.       Urobilinogen, UA   Negative           WBC         11.10                        All pertinent labs within the past 24 hours have been reviewed.    Significant Imaging: I have reviewed all pertinent imaging results/findings within the past 24 hours.   Imaging Results          X-Ray Chest AP Portable (Final result)  Result time 03/18/19 07:40:49    Final result by Sang Oneal MD (03/18/19 07:40:49)                 Impression:      No acute process seen.      Electronically signed by: Sang Oneal MD  Date:    03/18/2019  Time:    07:40             Narrative:    EXAMINATION:  XR CHEST AP PORTABLE    CLINICAL HISTORY:  Chest  "Pain;    FINDINGS:  Single view of the chest.    Cardiac silhouette is normal.  The lungs demonstrate no evidence of active disease.  No evidence of pleural effusion or pneumothorax.  Bones appear intact.                                  Assessment/Plan:     * Chest pain    --currently resolved  --trend troponin  --nitro paste       Diabetes mellitus without complication    --patient reports he has not taken any medication in "over a year"  --accuchecks and SSI  --HA1C pending  --will need to restart metformin on DC         VTE Risk Mitigation (From admission, onward)        Ordered     IP VTE LOW RISK PATIENT  Once      03/18/19 0612     Place sequential compression device  Until discontinued      03/18/19 0612             MIKE Trotter  Department of Hospital Medicine   [facility]  "

## 2019-03-18 NOTE — HPI
"Mr. Soto is a 33 year old male patient with a PMHx of DM (non-compliant with medication) and GERD who presented to [facility] ED this AM with a chief complaint of substernal chest pressure/tightness that began yesterday morning. Patient reported the pain radiated upward toward his jaw and down his left arm and was associated with diaphoresis and nausea. He denied any associated SOB, fever, chills, palpitations, near syncope, or syncope. Initial workup in ED negative and patient subsequently admitted overnight for further evaluation. Repeat troponin this AM positive and cardiology consulted to assist with management. Patient seen and examined today, sitting in bed. Still complains of mild, constant substernal chest tightness. States it has improved from yesterday but is still present. Denies any other symptoms at time of exam. No prior cardiac history. Not on any meds at home. No recent febrile or viral illness. States he has not been followed by a PCP in "quite some time". Chart reviewed. Troponin 0.017>0.068>2.081. EKG does not show any acute ischemic changes. 2D echo pending.   "

## 2019-03-18 NOTE — CONSULTS
CM met with patient to discuss need for PCP. Patient confirmed he does not have PCP and does not have insurance. Patient declined [facility] rep screening for medicaid application. Discussed [facility]. Patient confirmed that he would like CM to schedule hospital follow-up appointment to establish care with PCP at [facility].   CM scheduled appointment for Friday, March 29th @ 3:00pm. Notified patient of scheduled appointment day/time. Appointment charted to AVS.

## 2019-03-18 NOTE — PLAN OF CARE
Met with patient to complete discharge planning assessment. Confirmed demographics and contact list. Patient reports that he lives at home with his cousin. Patient is independent with adls and iadls. Patient denies any post hospital needs or services at this time. Informed patient that CM will continue to follow as needs or recommendations change. Updated white board with 's name and number. Provided Transitional Care Folder with information on Advance Directives, Discharge Planning Begins on Admission pamphlet, and [facility] pamphlet. Instructed patient or family to call with any questions or concerns.       03/18/19 1058   Discharge Assessment   Assessment Type Discharge Planning Assessment   Confirmed/corrected address and phone number on facesheet? Yes   Assessment information obtained from? Patient   Communicated expected length of stay with patient/caregiver yes   Prior to hospitilization cognitive status: Alert/Oriented   Prior to hospitalization functional status: Independent   Current cognitive status: Alert/Oriented   Current Functional Status: Independent   Lives With other relative(s)  (cousin)   Able to Return to Prior Arrangements yes   Is patient able to care for self after discharge? Yes   Who are your caregiver(s) and their phone number(s)? Amanda Soto, mother [phone]   Readmission Within the Last 30 Days no previous admission in last 30 days   Patient currently being followed by outpatient case management? No   Patient currently receives any other outside agency services? No   Equipment Currently Used at Home none   Does the patient have transportation home? Yes   Transportation Anticipated family or friend will provide  (mother)   Discharge Plan A Home with family;Home   DME Needed Upon Discharge  none   Patient/Family in Agreement with Plan yes

## 2019-03-18 NOTE — INTERVAL H&P NOTE
The patient has been examined and the H&P has been reviewed:    I concur with the findings and no changes have occurred since H&P was written.    Anesthesia/Surgery risks, benefits and alternative options discussed and understood by patient/family.  I have explained the risks, benefits , and alternatives of the procedure in detail.the patient voices understanding and all questions have been answered.the patient agrees to proceed as planned.          Active Hospital Problems    Diagnosis  POA    *NSTEMI (non-ST elevated myocardial infarction) [I21.4]  Unknown    Chest pain [R07.9]  Yes    Diabetes mellitus without complication [E11.9]  Yes      Resolved Hospital Problems   No resolved problems to display.

## 2019-03-18 NOTE — ASSESSMENT & PLAN NOTE
"--patient reports he has not taken any medication in "over a year"  --accuchecks and SSI  --HA1C pending  --will need to restart metformin on DC    "

## 2019-03-18 NOTE — PLAN OF CARE
Problem: Adult Inpatient Plan of Care  Goal: Plan of Care Review  Outcome: Ongoing (interventions implemented as appropriate)  Dx NSTEMI  POC to cath lab today for a stent.  Heparin drip prior to cath.  VSS. NSR on tele. PIV CDI.  Denied chest pain all day.  Room air.  Fall prevention instruction given. Bed in low and locked position.  Call bell within reach. Clean and clear environment maintained.  Troponin trending upward to >2.

## 2019-03-18 NOTE — CONSULTS
"[facility]  Cardiology  Consult Note    Patient Name: Peewee Soto Jr.  MRN: [id number]  Admission Date: 3/18/2019  Hospital Length of Stay: 0 days  Code Status: Full Code   Attending Provider: Marisela Richard MD   Consulting Provider: Amna Butcher PA-C  Primary Care Physician: Primary Doctor No  Principal Problem:Chest pain    Patient information was obtained from patient, past medical records and ER records.     Inpatient consult to Cardiology  Consult performed by: Amna Butcher PA-C  Consult ordered by: MIKE Pierre        Subjective:     Chief Complaint:  Chest pain    HPI:   Mr. Soto is a 33 year old male patient with a PMHx of DM (non-compliant with medication) and GERD who presented to [facility] ED this AM with a chief complaint of substernal chest pressure/tightness that began yesterday morning. Patient reported the pain radiated upward toward his jaw and down his left arm and was associated with diaphoresis and nausea. He denied any associated SOB, fever, chills, palpitations, near syncope, or syncope. Initial workup in ED negative and patient subsequently admitted overnight for further evaluation. Repeat troponin this AM positive and cardiology consulted to assist with management. Patient seen and examined today, sitting in bed. Still complains of mild, constant substernal chest tightness. States it has improved from yesterday but is still present. Denies any other symptoms at time of exam. No prior cardiac history. Not on any meds at home. No recent febrile or viral illness. States he has not been followed by a PCP in "quite some time". Chart reviewed. Troponin 0.017>0.068>2.081. EKG does not show any acute ischemic changes. 2D echo pending.     Past Medical History:   Diagnosis Date    Acid reflux     Diabetes mellitus     Undescended testicle before puberty        Past Surgical History:   Procedure Laterality Date    APPENDECTOMY N/A 11/11/2015 "    Performed by [facility]valdo Guy [facility] at [facility] OR    HERNIA REPAIR         Review of patient's allergies indicates:   Allergen Reactions    Pcn [penicillins] Rash       No current facility-administered medications on file prior to encounter.      Current Outpatient Medications on File Prior to Encounter   Medication Sig    esomeprazole (NEXIUM) 20 MG capsule Take 20 mg by mouth before breakfast.    metformin (GLUCOPHAGE) 500 MG tablet Take 1 tablet (500 mg total) by mouth 2 (two) times daily with meals.    [DISCONTINUED] acetaminophen-codeine 300-30mg (TYLENOL-CODEINE #3) 300-30 mg Tab Take 1 tablet by mouth every 6 (six) hours as needed.    [DISCONTINUED] diclofenac (VOLTAREN) 50 MG EC tablet Take 1 tablet (50 mg total) by mouth 3 (three) times daily as needed (PAIN).    [DISCONTINUED] glycerin adult (FLEET GLYCERIN, ADULT,) suppository Place 1 suppository rectally as needed for Constipation.    [DISCONTINUED] ketorolac (TORADOL) 10 mg tablet Take 1 tablet (10 mg total) by mouth every 6 (six) hours.     Family History     Problem Relation (Age of Onset)    Diabetes Mother    Heart disease Maternal Grandmother    Hypertension Father        Tobacco Use    Smoking status: Never Smoker    Smokeless tobacco: Never Used   Substance and Sexual Activity    Alcohol use: No    Drug use: No    Sexual activity: No     Review of Systems   Constitution: Positive for diaphoresis.   Eyes: Negative.    Cardiovascular: Positive for chest pain.   Respiratory: Negative.    Endocrine: Negative.    Hematologic/Lymphatic: Negative.    Skin: Negative.    Musculoskeletal: Negative.    Gastrointestinal: Positive for nausea.   Genitourinary: Negative.    Neurological: Negative.    Psychiatric/Behavioral: Negative.    Allergic/Immunologic: Negative.      Objective:     Vital Signs (Most Recent):  Temp: 98.7 °F (37.1 °C) (03/18/19 0743)  Pulse: 92 (03/18/19 1100)  Resp: 20 (03/18/19 0743)  BP: 138/77 (03/18/19 0743)  SpO2: 95 % (03/18/19  0743) Vital Signs (24h Range):  Temp:  [98 °F (36.7 °C)-98.7 °F (37.1 °C)] 98.7 °F (37.1 °C)  Pulse:  [] 92  Resp:  [16-20] 20  SpO2:  [95 %-99 %] 95 %  BP: (138-188)/() 138/77     Weight: 72.6 kg (159 lb 15.1 oz)  Body mass index is 27.45 kg/m².    SpO2: 95 %  O2 Device (Oxygen Therapy): room air    No intake or output data in the 24 hours ending 03/18/19 1458    Lines/Drains/Airways     Peripheral Intravenous Line                 Peripheral IV - Single Lumen 03/18/19 0204 Left Forearm less than 1 day                Physical Exam   Constitutional: He is oriented to person, place, and time. He appears well-developed and well-nourished. No distress.   HENT:   Head: Normocephalic and atraumatic.   Eyes: Pupils are equal, round, and reactive to light. Right eye exhibits no discharge. Left eye exhibits no discharge.   Neck: Neck supple. No JVD present.   Cardiovascular: Normal rate, regular rhythm, S1 normal, S2 normal and normal heart sounds.   No murmur heard.  Pulmonary/Chest: Effort normal and breath sounds normal. No respiratory distress. He has no wheezes. He has no rales.   Abdominal: Soft. He exhibits no distension. There is no tenderness.   Musculoskeletal: He exhibits no edema.   Neurological: He is alert and oriented to person, place, and time.   Skin: Skin is warm and dry. He is not diaphoretic. No erythema.   Psychiatric: He has a normal mood and affect. His behavior is normal. Thought content normal.   Nursing note and vitals reviewed.      Significant Labs:   CMP   Recent Labs   Lab 03/18/19  0237      K 3.8      CO2 26   *   BUN 21*   CREATININE 1.1   CALCIUM 9.5   PROT 7.3   ALBUMIN 4.1   BILITOT 0.2   ALKPHOS 86   AST 28   ALT 85*   ANIONGAP 12   ESTGFRAFRICA >60   EGFRNONAA >60   , CBC   Recent Labs   Lab 03/18/19 0237   WBC 11.10   HGB 16.1   HCT 47.3      , Troponin   Recent Labs   Lab 03/18/19  0237 03/18/19  0446 03/18/19  1108   TROPONINI 0.017 0.068* 2.081*     and All pertinent lab results from the last 24 hours have been reviewed.    Significant Imaging: Echocardiogram: 2D echo with color flow doppler: No results found for this or any previous visit. and X-Ray: CXR: X-Ray Chest 1 View (CXR): No results found for this visit on 03/18/19. and X-Ray Chest PA and Lateral (CXR): No results found for this visit on 03/18/19.    Assessment and Plan:   Patient who presents with chest pain/NSTEMI. Continue same meds. [facility] today, further rec's to follow.    * Chest pain    -See plan under NSTEMI     NSTEMI (non-ST elevated myocardial infarction)    -Patient who presents with substernal chest tightness/pressure radiating to his jaw and left arm along with elevated troponin  -Presentation consistent with NSTEMI  -Continue ASA, Coreg, ACEi, statin  -Start heparin gtt   -2D echo pending  -Check lipid panel  -[facility] today by Dr. Moreno. All risks, benefits, and treatment alternatives explained to patient in detail. All questions answered. He has agreed to proceed.      Diabetes mellitus without complication    -Counseled on risk factor modification  -A1C pending  -Mgmt as per hospital medicine         VTE Risk Mitigation (From admission, onward)        Ordered     heparin 25,000 units in dextrose 5% 250 mL (100 units/mL) infusion LOW INTENSITY nomogram - [facility]  Continuous      03/18/19 1240     heparin 25,000 units in dextrose 5% (100 units/ml) IV bolus from bag - ADDITIONAL PRN BOLUS - 60 units/kg (max bolus 4000 units)  As needed (PRN)      03/18/19 1240     heparin 25,000 units in dextrose 5% (100 units/ml) IV bolus from bag - ADDITIONAL PRN BOLUS - 30 units/kg (max bolus 4000 units)  As needed (PRN)      03/18/19 1240     IP VTE LOW RISK PATIENT  Once      03/18/19 0612     Place sequential compression device  Until discontinued      03/18/19 0612          Thank you for your consult. I will follow-up with patient. Please contact us if you have any additional questions.    Amna GU  JUMANA Butcher  Cardiology   [facility]    Chart reviewed. Dr. Moreno examined patient and agrees with plan as outlined above.

## 2019-03-18 NOTE — ED PROVIDER NOTES
SCRIBE #1 NOTE: I, Cristina Quinn, am scribing for, and in the presence of, Ibrahima Zapata MD. I have scribed the entire note.         History     Chief Complaint   Patient presents with    Chest Pain     patient c/o midsternal chest pain since yesterday, vomited x1       Review of patient's allergies indicates:   Allergen Reactions    Pcn [penicillins] Rash         History of Present Illness   HPI    3/18/2019, 2:23 AM  History obtained from the patient      History of Present Illness: Peewee Soto Jr. is a 33 y.o. male patient with PMHx of DM who presents to the Emergency Department for midsternal CP which onset gradually at 5pm, worsening when sleeping tonight. Symptoms are worsening and moderate in severity. No mitigating or exacerbating factors reported. Associated sxs include 1 episode of emesis. No nausea at this time. Patient denies any SOB, diaphoresis, palpitations, extremity weakness/numbness, leg swelling, dizziness, cough, and all other sxs at this time. Patient states he is a noncompliant diabetic. No further complaints or concerns at this time.     Arrival mode: Personal vehicle      PCP: Primary Doctor No        Past Medical History:  Past Medical History:   Diagnosis Date    Acid reflux     Undescended testicle before puberty        Past Surgical History:  Past Surgical History:   Procedure Laterality Date    APPENDECTOMY N/A 11/11/2015    Performed by [facility] at [facility] OR    HERNIA REPAIR           Family History:  Family History   Problem Relation Age of Onset    Diabetes Mother     Hypertension Father     Heart disease Maternal Grandmother        Social History:  Social History     Tobacco Use    Smoking status: Never Smoker    Smokeless tobacco: Never Used   Substance and Sexual Activity    Alcohol use: No    Drug use: No    Sexual activity: No        Review of Systems   Review of Systems   Constitutional: Negative for diaphoresis and fever.   HENT: Negative  for sore throat.    Respiratory: Negative for cough and shortness of breath.    Cardiovascular: Positive for chest pain. Negative for palpitations and leg swelling.   Gastrointestinal: Positive for vomiting. Negative for nausea.   Genitourinary: Negative for dysuria.   Musculoskeletal: Negative for back pain.   Skin: Negative for rash.   Neurological: Negative for dizziness, weakness and numbness.   Hematological: Does not bruise/bleed easily.   All other systems reviewed and are negative.       Physical Exam     Initial Vitals [03/18/19 0131]   BP Pulse Resp Temp SpO2   (!) 168/104 101 20 98.6 °F (37 °C) 97 %      MAP       --          Physical Exam  Nursing Notes and Vital Signs Reviewed.  Constitutional: Patient is in no acute distress. Well-developed and well-nourished.  Head: Atraumatic. Normocephalic.  Eyes: PERRL. EOM intact. Conjunctivae are not pale. No scleral icterus.  ENT: Mucous membranes are moist. Oropharynx is clear and symmetric.    Neck: Supple. Full ROM. No lymphadenopathy.  Cardiovascular: Regular rate. Regular rhythm. No murmurs, rubs, or gallops. Distal pulses are 2+ and symmetric.  Pulmonary/Chest: No respiratory distress. Clear to auscultation bilaterally. No wheezing or rales.  Abdominal: Soft and non-distended.  There is no tenderness.  No rebound, guarding, or rigidity.   Musculoskeletal: Moves all extremities. No obvious deformities. No edema. No calf tenderness.  Skin: Warm and dry.  Neurological:  Alert, awake, and appropriate.  Normal speech.  No acute focal neurological deficits are appreciated.  Psychiatric: Normal affect. Good eye contact. Appropriate in content.     ED Course   Critical Care  Date/Time: 3/18/2019 6:05 AM  Performed by: Ibrahima Zapata MD  Authorized by: Ibrahima Zapata MD   Direct patient critical care time: 7 minutes  Additional history critical care time: 7 minutes  Ordering / reviewing critical care time: 7 minutes  Documentation critical care  "time: 7 minutes  Consulting other physicians critical care time: 7 minutes  Total critical care time (exclusive of procedural time) : 35 minutes  Critical care time was exclusive of separately billable procedures and treating other patients and teaching time.  Critical care was necessary to treat or prevent imminent or life-threatening deterioration of the following conditions: cardiac failure.  Critical care was time spent personally by me on the following activities: blood draw for specimens, development of treatment plan with patient or surrogate, discussions with consultants, interpretation of cardiac output measurements, evaluation of patient's response to treatment, examination of patient, obtaining history from patient or surrogate, ordering and performing treatments and interventions, ordering and review of laboratory studies, pulse oximetry, ordering and review of radiographic studies, re-evaluation of patient's condition and review of old charts.        ED Vital Signs:  Vitals:    03/18/19 0131 03/18/19 0155 03/18/19 0202 03/18/19 0204   BP: (!) 168/104 (!) 150/95 (!) 150/94    Pulse: 101 103 83 76   Resp: 20 20 18    Temp: 98.6 °F (37 °C)      TempSrc: Oral      SpO2: 97% 97% 97%    Weight: 72.6 kg (159 lb 15.1 oz)      Height: 5' 4" (1.626 m)       03/18/19 0300 03/18/19 0400 03/18/19 0500 03/18/19 0600   BP: (!) 152/96 (!) 167/99 (!) 188/97 (!) 145/87   Pulse: 84 (!) 115 92 87   Resp: 16 20 20 20   Temp:       TempSrc:       SpO2: 98% 99% 98% 98%   Weight:       Height:           Abnormal Lab Results:  Labs Reviewed   CBC W/ AUTO DIFFERENTIAL - Abnormal; Notable for the following components:       Result Value    MCH 32.2 (*)     All other components within normal limits   COMPREHENSIVE METABOLIC PANEL - Abnormal; Notable for the following components:    Glucose 189 (*)     BUN, Bld 21 (*)     ALT 85 (*)     All other components within normal limits   TROPONIN I - Abnormal; Notable for the following " components:    Troponin I 0.068 (*)     All other components within normal limits   URINALYSIS - Abnormal; Notable for the following components:    Protein, UA Trace (*)     Glucose, UA 2+ (*)     Ketones, UA Trace (*)     All other components within normal limits   POCT GLUCOSE - Abnormal; Notable for the following components:    POCT Glucose 164 (*)     All other components within normal limits   POCT GLUCOSE - Abnormal; Notable for the following components:    POCT Glucose 203 (*)     All other components within normal limits   TROPONIN I   B-TYPE NATRIURETIC PEPTIDE   CK   TROPONIN I   DRUG SCREEN PANEL, URINE EMERGENCY   POCT GLUCOSE MONITORING CONTINUOUS   POCT GLUCOSE MONITORING CONTINUOUS        All Lab Results:  Results for orders placed or performed during the hospital encounter of 03/18/19   CBC auto differential   Result Value Ref Range    WBC 11.10 3.90 - 12.70 K/uL    RBC 5.00 4.60 - 6.20 M/uL    Hemoglobin 16.1 14.0 - 18.0 g/dL    Hematocrit 47.3 40.0 - 54.0 %    MCV 95 82 - 98 fL    MCH 32.2 (H) 27.0 - 31.0 pg    MCHC 34.0 32.0 - 36.0 g/dL    RDW 12.1 11.5 - 14.5 %    Platelets 316 150 - 350 K/uL    MPV 9.3 9.2 - 12.9 fL    Gran # (ANC) 6.5 1.8 - 7.7 K/uL    Lymph # 3.5 1.0 - 4.8 K/uL    Mono # 0.9 0.3 - 1.0 K/uL    Eos # 0.1 0.0 - 0.5 K/uL    Baso # 0.03 0.00 - 0.20 K/uL    Gran% 58.9 38.0 - 73.0 %    Lymph% 31.6 18.0 - 48.0 %    Mono% 8.0 4.0 - 15.0 %    Eosinophil% 1.2 0.0 - 8.0 %    Basophil% 0.3 0.0 - 1.9 %    Differential Method Automated    Comprehensive metabolic panel   Result Value Ref Range    Sodium 139 136 - 145 mmol/L    Potassium 3.8 3.5 - 5.1 mmol/L    Chloride 101 95 - 110 mmol/L    CO2 26 23 - 29 mmol/L    Glucose 189 (H) 70 - 110 mg/dL    BUN, Bld 21 (H) 6 - 20 mg/dL    Creatinine 1.1 0.5 - 1.4 mg/dL    Calcium 9.5 8.7 - 10.5 mg/dL    Total Protein 7.3 6.0 - 8.4 g/dL    Albumin 4.1 3.5 - 5.2 g/dL    Total Bilirubin 0.2 0.1 - 1.0 mg/dL    Alkaline Phosphatase 86 55 - 135 U/L    AST  28 10 - 40 U/L    ALT 85 (H) 10 - 44 U/L    Anion Gap 12 8 - 16 mmol/L    eGFR if African American >60 >60 mL/min/1.73 m^2    eGFR if non African American >60 >60 mL/min/1.73 m^2   Troponin I #1   Result Value Ref Range    Troponin I 0.017 0.000 - 0.026 ng/mL   B-Type natriuretic peptide (BNP)   Result Value Ref Range    BNP <10 0 - 99 pg/mL   Troponin I   Result Value Ref Range    Troponin I 0.068 (H) 0.000 - 0.026 ng/mL   Urinalysis   Result Value Ref Range    Specimen UA Urine, Clean Catch     Color, UA Yellow Yellow, Straw, Sis    Appearance, UA Clear Clear    pH, UA 7.0 5.0 - 8.0    Specific Gravity, UA 1.020 1.005 - 1.030    Protein, UA Trace (A) Negative    Glucose, UA 2+ (A) Negative    Ketones, UA Trace (A) Negative    Bilirubin (UA) Negative Negative    Occult Blood UA Negative Negative    Nitrite, UA Negative Negative    Urobilinogen, UA Negative <2.0 EU/dL    Leukocytes, UA Negative Negative   POCT glucose   Result Value Ref Range    POCT Glucose 164 (H) 70 - 110 mg/dL   POCT glucose   Result Value Ref Range    POCT Glucose 203 (H) 70 - 110 mg/dL       Imaging Results:  Imaging Results          X-Ray Chest AP Portable (Preliminary result)  Result time 03/18/19 05:10:57    ED Interpretation by Ibrahima Zapata MD (03/18/19 05:10:57, [facility] Emergency Medicine)    NAF                               The EKG was ordered, reviewed, and independently interpreted by the ED provider.  Interpretation time: 0136  Rate: 99 BPM  Rhythm: normal sinus rhythm  Interpretation: Right superior axis deviation. No STEMI.          The Emergency Provider reviewed the vital signs and test results, which are outlined above.     ED Discussion     6:00 AM: Discussed case with Dr. Benavides ([facility]) who agrees with current care and management of pt and accepts admission. Dr. Benavides recommends order CPK and UDS.  Admitting Service: Hospital Medicine  Admitting Physician: Dr. Benavides  Admit to:  Obs    6:03 AM: Re-evaluated pt. I have discussed test results, shared treatment plan, and the need for admission with patient and family at bedside. Pt and family express understanding at this time and agree with all information. All questions answered. Pt and family have no further questions or concerns at this time. Pt is ready for admit.          ED Medication(s):  Medications   nitroGLYCERIN 2% TD oint ointment 1 inch (1 inch Topical (Top) Given 3/18/19 0608)   sodium chloride 0.9% flush 5 mL (not administered)   aspirin tablet 325 mg (not administered)   glucose chewable tablet 16 g (not administered)   glucose chewable tablet 24 g (not administered)   dextrose 50% injection 12.5 g (not administered)   dextrose 50% injection 25 g (not administered)   glucagon (human recombinant) injection 1 mg (not administered)   insulin aspart U-100 pen 1-10 Units (not administered)   aspirin tablet 325 mg (325 mg Oral Given 3/18/19 0243)   GI cocktail (mylanta 30 mL, lidocaine 2 % viscous 10 mL, dicyclomine 10 mL) 50 mL (50 mLs Oral Given 3/18/19 0454)   metoprolol injection 5 mg (5 mg Intravenous Given 3/18/19 0608)     New Prescriptions    No medications on file                Medical Decision Making     Medical Decision Making:   Clinical Tests:   Lab Tests: Ordered and Reviewed  Radiological Study: Ordered and Reviewed  Medical Tests: Ordered and Reviewed             Scribe Attestation:   Scribe #1: I performed the above scribed service and the documentation accurately describes the services I performed. I attest to the accuracy of the note.     Attending:   Physician Attestation Statement for Scribe #1: I, Ibrahima Zapata MD, personally performed the services described in this documentation, as scribed by Cristina Quinn, in my presence, and it is both accurate and complete.           Clinical Impression       ICD-10-CM ICD-9-CM   1. Chest pain R07.9 786.50       Disposition:   Disposition: Placed in  Observation  Condition: Fair         Ibrahima Zapata MD  03/18/19 0617

## 2019-03-18 NOTE — SUBJECTIVE & OBJECTIVE
Past Medical History:   Diagnosis Date    Acid reflux     Diabetes mellitus     Undescended testicle before puberty        Past Surgical History:   Procedure Laterality Date    APPENDECTOMY N/A 11/11/2015    Performed by [facility] Guy [facility] at [facility] OR    HERNIA REPAIR         Review of patient's allergies indicates:   Allergen Reactions    Pcn [penicillins] Rash       No current facility-administered medications on file prior to encounter.      Current Outpatient Medications on File Prior to Encounter   Medication Sig    esomeprazole (NEXIUM) 20 MG capsule Take 20 mg by mouth before breakfast.    metformin (GLUCOPHAGE) 500 MG tablet Take 1 tablet (500 mg total) by mouth 2 (two) times daily with meals.    [DISCONTINUED] acetaminophen-codeine 300-30mg (TYLENOL-CODEINE #3) 300-30 mg Tab Take 1 tablet by mouth every 6 (six) hours as needed.    [DISCONTINUED] diclofenac (VOLTAREN) 50 MG EC tablet Take 1 tablet (50 mg total) by mouth 3 (three) times daily as needed (PAIN).    [DISCONTINUED] glycerin adult (FLEET GLYCERIN, ADULT,) suppository Place 1 suppository rectally as needed for Constipation.    [DISCONTINUED] ketorolac (TORADOL) 10 mg tablet Take 1 tablet (10 mg total) by mouth every 6 (six) hours.     Family History     Problem Relation (Age of Onset)    Diabetes Mother    Heart disease Maternal Grandmother    Hypertension Father        Tobacco Use    Smoking status: Never Smoker    Smokeless tobacco: Never Used   Substance and Sexual Activity    Alcohol use: No    Drug use: No    Sexual activity: No     Review of Systems   Constitution: Positive for diaphoresis.   Eyes: Negative.    Cardiovascular: Positive for chest pain.   Respiratory: Negative.    Endocrine: Negative.    Hematologic/Lymphatic: Negative.    Skin: Negative.    Musculoskeletal: Negative.    Gastrointestinal: Positive for nausea.   Genitourinary: Negative.    Neurological: Negative.    Psychiatric/Behavioral: Negative.     Allergic/Immunologic: Negative.      Objective:     Vital Signs (Most Recent):  Temp: 98.7 °F (37.1 °C) (03/18/19 0743)  Pulse: 92 (03/18/19 1100)  Resp: 20 (03/18/19 0743)  BP: 138/77 (03/18/19 0743)  SpO2: 95 % (03/18/19 0743) Vital Signs (24h Range):  Temp:  [98 °F (36.7 °C)-98.7 °F (37.1 °C)] 98.7 °F (37.1 °C)  Pulse:  [] 92  Resp:  [16-20] 20  SpO2:  [95 %-99 %] 95 %  BP: (138-188)/() 138/77     Weight: 72.6 kg (159 lb 15.1 oz)  Body mass index is 27.45 kg/m².    SpO2: 95 %  O2 Device (Oxygen Therapy): room air    No intake or output data in the 24 hours ending 03/18/19 1458    Lines/Drains/Airways     Peripheral Intravenous Line                 Peripheral IV - Single Lumen 03/18/19 0204 Left Forearm less than 1 day                Physical Exam   Constitutional: He is oriented to person, place, and time. He appears well-developed and well-nourished. No distress.   HENT:   Head: Normocephalic and atraumatic.   Eyes: Pupils are equal, round, and reactive to light. Right eye exhibits no discharge. Left eye exhibits no discharge.   Neck: Neck supple. No JVD present.   Cardiovascular: Normal rate, regular rhythm, S1 normal, S2 normal and normal heart sounds.   No murmur heard.  Pulmonary/Chest: Effort normal and breath sounds normal. No respiratory distress. He has no wheezes. He has no rales.   Abdominal: Soft. He exhibits no distension. There is no tenderness.   Musculoskeletal: He exhibits no edema.   Neurological: He is alert and oriented to person, place, and time.   Skin: Skin is warm and dry. He is not diaphoretic. No erythema.   Psychiatric: He has a normal mood and affect. His behavior is normal. Thought content normal.   Nursing note and vitals reviewed.      Significant Labs:   CMP   Recent Labs   Lab 03/18/19  0237      K 3.8      CO2 26   *   BUN 21*   CREATININE 1.1   CALCIUM 9.5   PROT 7.3   ALBUMIN 4.1   BILITOT 0.2   ALKPHOS 86   AST 28   ALT 85*   ANIONGAP 12    ESTGFRAFRICA >60   EGFRNONAA >60   , CBC   Recent Labs   Lab 03/18/19  0237   WBC 11.10   HGB 16.1   HCT 47.3      , Troponin   Recent Labs   Lab 03/18/19  0237 03/18/19  0446 03/18/19  1108   TROPONINI 0.017 0.068* 2.081*    and All pertinent lab results from the last 24 hours have been reviewed.    Significant Imaging: Echocardiogram: 2D echo with color flow doppler: No results found for this or any previous visit. and X-Ray: CXR: X-Ray Chest 1 View (CXR): No results found for this visit on 03/18/19. and X-Ray Chest PA and Lateral (CXR): No results found for this visit on 03/18/19.

## 2019-03-18 NOTE — SUBJECTIVE & OBJECTIVE
Past Medical History:   Diagnosis Date    Acid reflux     Undescended testicle before puberty        Past Surgical History:   Procedure Laterality Date    APPENDECTOMY N/A 11/11/2015    Performed by [facility] at [facility] OR    HERNIA REPAIR         Review of patient's allergies indicates:   Allergen Reactions    Pcn [penicillins] Rash       No current facility-administered medications on file prior to encounter.      Current Outpatient Medications on File Prior to Encounter   Medication Sig    esomeprazole (NEXIUM) 20 MG capsule Take 20 mg by mouth before breakfast.    metformin (GLUCOPHAGE) 500 MG tablet Take 1 tablet (500 mg total) by mouth 2 (two) times daily with meals.    [DISCONTINUED] acetaminophen-codeine 300-30mg (TYLENOL-CODEINE #3) 300-30 mg Tab Take 1 tablet by mouth every 6 (six) hours as needed.    [DISCONTINUED] diclofenac (VOLTAREN) 50 MG EC tablet Take 1 tablet (50 mg total) by mouth 3 (three) times daily as needed (PAIN).    [DISCONTINUED] glycerin adult (FLEET GLYCERIN, ADULT,) suppository Place 1 suppository rectally as needed for Constipation.    [DISCONTINUED] ketorolac (TORADOL) 10 mg tablet Take 1 tablet (10 mg total) by mouth every 6 (six) hours.     Family History     Problem Relation (Age of Onset)    Diabetes Mother    Heart disease Maternal Grandmother    Hypertension Father        Tobacco Use    Smoking status: Never Smoker    Smokeless tobacco: Never Used   Substance and Sexual Activity    Alcohol use: No    Drug use: No    Sexual activity: No     Review of Systems   Constitutional: Positive for diaphoresis. Negative for activity change, appetite change, chills, fatigue and fever.   HENT: Negative for dental problem, ear pain, hearing loss, mouth sores, nosebleeds, sinus pressure, sore throat, tinnitus and trouble swallowing.    Eyes: Negative for pain, discharge and visual disturbance.   Respiratory: Negative for cough, choking, chest tightness, shortness of breath  and wheezing.    Cardiovascular: Positive for chest pain. Negative for palpitations and leg swelling.   Gastrointestinal: Positive for vomiting. Negative for abdominal distention, abdominal pain, anal bleeding, blood in stool, constipation, diarrhea and nausea.   Endocrine: Negative for cold intolerance, heat intolerance, polydipsia, polyphagia and polyuria.   Genitourinary: Negative for decreased urine volume, difficulty urinating, flank pain, frequency, hematuria and urgency.   Musculoskeletal: Negative for arthralgias, back pain, gait problem, myalgias, neck pain and neck stiffness.   Skin: Negative for color change, rash and wound.   Allergic/Immunologic: Negative for food allergies and immunocompromised state.   Neurological: Negative for dizziness, tremors, seizures, syncope, speech difficulty, weakness, light-headedness and headaches.   Hematological: Negative for adenopathy. Does not bruise/bleed easily.   Psychiatric/Behavioral: Negative for confusion and sleep disturbance. The patient is not nervous/anxious.    All other systems reviewed and are negative.    Objective:     Vital Signs (Most Recent):  Temp: 98.7 °F (37.1 °C) (03/18/19 0743)  Pulse: 77 (03/18/19 0743)  Resp: 20 (03/18/19 0743)  BP: 138/77 (03/18/19 0743)  SpO2: 95 % (03/18/19 0743) Vital Signs (24h Range):  Temp:  [98 °F (36.7 °C)-98.7 °F (37.1 °C)] 98.7 °F (37.1 °C)  Pulse:  [] 77  Resp:  [16-20] 20  SpO2:  [95 %-99 %] 95 %  BP: (138-188)/() 138/77     Weight: 72.6 kg (159 lb 15.1 oz)  Body mass index is 27.45 kg/m².    Physical Exam   Constitutional: He is oriented to person, place, and time. He appears well-developed and well-nourished. No distress.   HENT:   Head: Normocephalic and atraumatic.   Nose: Nose normal.   Eyes: Conjunctivae and EOM are normal. Pupils are equal, round, and reactive to light. Right eye exhibits no discharge. Left eye exhibits no discharge.   Neck: Normal range of motion. Neck supple. No JVD present.  No tracheal deviation present. No thyromegaly present.   Cardiovascular: Normal rate, regular rhythm, normal heart sounds and intact distal pulses. Exam reveals no gallop and no friction rub.   No murmur heard.  Pulmonary/Chest: Effort normal and breath sounds normal. No respiratory distress. He has no wheezes. He has no rales. He exhibits no tenderness.   Abdominal: Soft. Bowel sounds are normal. He exhibits no distension and no mass. There is no tenderness.   Genitourinary:   Genitourinary Comments: deferred   Musculoskeletal: Normal range of motion. He exhibits no edema, tenderness or deformity.   Neurological: He is alert and oriented to person, place, and time. No cranial nerve deficit. He exhibits normal muscle tone. Coordination normal.   Skin: Skin is warm and dry. Capillary refill takes less than 2 seconds. No rash noted. He is not diaphoretic. No erythema.   Psychiatric: He has a normal mood and affect. His behavior is normal.   Nursing note and vitals reviewed.        CRANIAL NERVES     CN III, IV, VI   Pupils are equal, round, and reactive to light.  Extraocular motions are normal.        Significant Labs:   Recent Lab Results       03/18/19  0616   03/18/19  0557   03/18/19  0446   03/18/19  0243   03/18/19  0237        Benzodiazepines   Negative           Methadone metabolites   Negative           Phencyclidine   Negative           Albumin         4.1     Alkaline Phosphatase         86     ALT         85     Amphetamine Screen, Ur   Negative           Anion Gap         12     Appearance, UA   Clear           AST         28     Barbiturate Screen, Ur   Negative           Baso #         0.03     Basophil%         0.3     Bilirubin (UA)   Negative           Total Bilirubin         0.2  Comment:  For infants and newborns, interpretation of results should be based  on gestational age, weight and in agreement with clinical  observations.  Premature Infant recommended reference ranges:  Up to 24  hours.............<8.0 mg/dL  Up to 48 hours............<12.0 mg/dL  3-5 days..................<15.0 mg/dL  6-29 days.................<15.0 mg/dL       BNP         <10  Comment:  Values of less than 100 pg/ml are consistent with non-CHF populations.     BUN, Bld         21     Calcium         9.5     Chloride         101     CO2         26     Cocaine (Metab.)   Negative           Color, UA   Yellow           CPK     105         Creatinine         1.1     Creatinine, Random Ur   198.8  Comment:  The random urine reference ranges provided were established   for 24 hour urine collections.  No reference ranges exist for  random urine specimens.  Correlate clinically.             Differential Method         Automated     eGFR if          >60     eGFR if non          >60  Comment:  Calculation used to obtain the estimated glomerular filtration  rate (eGFR) is the CKD-EPI equation.        Eos #         0.1     Eosinophil%         1.2     Glucose         189     Glucose, UA   2+           Gran # (ANC)         6.5     Gran%         58.9     Hematocrit         47.3     Hemoglobin         16.1     Ketones, UA   Trace           Leukocytes, UA   Negative           Lymph #         3.5     Lymph%         31.6     MCH         32.2     MCHC         34.0     MCV         95     Mono #         0.9     Mono%         8.0     MPV         9.3     Nitrite, UA   Negative           Occult Blood UA   Negative           Opiate Scrn, Ur   Negative           pH, UA   7.0           Platelets         316     POCT Glucose 203     164       Potassium         3.8     Total Protein         7.3     Protein, UA   Trace  Comment:  Recommend a 24 hour urine protein or a urine   protein/creatinine ratio if globulin induced proteinuria is  clinically suspected.             RBC         5.00     RDW         12.1     Sodium         139     Specific [place], UA   1.020           Specimen UA   Urine, Clean Catch            Marijuana (THC) Metabolite   Negative           Toxicology Information   SEE COMMENT  Comment:  This screen includes the following classes of drugs at the   listed cut-off:  Benzodiazepines                  200 ng/ml  Methadone                        300 ng/ml  Cocaine metabolite               300 ng/ml  Opiates                          300 ng/ml  Barbiturates                     200 ng/ml  Amphetamines                    1000 ng/ml  Marijuana metabs (THC)            50 ng/ml  Phencyclidine (PCP)               25 ng/ml  High concentrations of Diphenhydramine may cross-react with  Phencyclidine PCP screening immunoassay giving a false   positive result.  High concentrations of Methylenedioxymethamphetamine (MDMA aka  Ectasy) and other structurally similar compounds may cross-   react with the Amphetamine/Methamphetamine screening   immunoassay giving a false positive result.  A metabolite of the anti-HIV drug Sustiva () may cause  false positive results in the Marijuana metabolite (THC)   screening assay.  Note: This exception list includes only more common   interferants in toxicology screen testing.  Because of many   cross-reactantspositive results on toxicology drug screens   should be confirmed whenever results do not correlate with   clinical presentation.  This report is intended for use in clinical monitoring and  management of patients. It is not intended for use in   employment related drug testing.  Because of any cross-reactants, positive results on toxicology  drug screens should be confirmed whenever results do not  correlate with clinical presentation.  Presumptive positive results are unconfirmed and may be used   only for medical purposes.             Troponin I     0.068  Comment:  The reference interval for Troponin I represents the 99th percentile   cutoff   for our facility and is consistent with 3rd generation assay   performance.     0.017  Comment:  The reference interval for Troponin I  represents the 99th percentile   cutoff   for our facility and is consistent with 3rd generation assay   performance.       Urobilinogen, UA   Negative           WBC         11.10                        All pertinent labs within the past 24 hours have been reviewed.    Significant Imaging: I have reviewed all pertinent imaging results/findings within the past 24 hours.   Imaging Results          X-Ray Chest AP Portable (Final result)  Result time 03/18/19 07:40:49    Final result by Sang Oneal MD (03/18/19 07:40:49)                 Impression:      No acute process seen.      Electronically signed by: Sang Oneal MD  Date:    03/18/2019  Time:    07:40             Narrative:    EXAMINATION:  XR CHEST AP PORTABLE    CLINICAL HISTORY:  Chest Pain;    FINDINGS:  Single view of the chest.    Cardiac silhouette is normal.  The lungs demonstrate no evidence of active disease.  No evidence of pleural effusion or pneumothorax.  Bones appear intact.

## 2019-03-18 NOTE — H&P (VIEW-ONLY)
"[facility]  Cardiology  Consult Note    Patient Name: Peewee Soto Jr.  MRN: [id number]  Admission Date: 3/18/2019  Hospital Length of Stay: 0 days  Code Status: Full Code   Attending Provider: Marisela Richard MD   Consulting Provider: Amna Butcher PA-C  Primary Care Physician: Primary Doctor No  Principal Problem:Chest pain    Patient information was obtained from patient, past medical records and ER records.     Inpatient consult to Cardiology  Consult performed by: Amna Butcher PA-C  Consult ordered by: MIKE Pierre        Subjective:     Chief Complaint:  Chest pain    HPI:   Mr. Soto is a 33 year old male patient with a PMHx of DM (non-compliant with medication) and GERD who presented to [facility] ED this AM with a chief complaint of substernal chest pressure/tightness that began yesterday morning. Patient reported the pain radiated upward toward his jaw and down his left arm and was associated with diaphoresis and nausea. He denied any associated SOB, fever, chills, palpitations, near syncope, or syncope. Initial workup in ED negative and patient subsequently admitted overnight for further evaluation. Repeat troponin this AM positive and cardiology consulted to assist with management. Patient seen and examined today, sitting in bed. Still complains of mild, constant substernal chest tightness. States it has improved from yesterday but is still present. Denies any other symptoms at time of exam. No prior cardiac history. Not on any meds at home. No recent febrile or viral illness. States he has not been followed by a PCP in "quite some time". Chart reviewed. Troponin 0.017>0.068>2.081. EKG does not show any acute ischemic changes. 2D echo pending.     Past Medical History:   Diagnosis Date    Acid reflux     Diabetes mellitus     Undescended testicle before puberty        Past Surgical History:   Procedure Laterality Date    APPENDECTOMY N/A 11/11/2015 "    Performed by [facility] at [facility] OR    HERNIA REPAIR         Review of patient's allergies indicates:   Allergen Reactions    Pcn [penicillins] Rash       No current facility-administered medications on file prior to encounter.      Current Outpatient Medications on File Prior to Encounter   Medication Sig    esomeprazole (NEXIUM) 20 MG capsule Take 20 mg by mouth before breakfast.    metformin (GLUCOPHAGE) 500 MG tablet Take 1 tablet (500 mg total) by mouth 2 (two) times daily with meals.    [DISCONTINUED] acetaminophen-codeine 300-30mg (TYLENOL-CODEINE #3) 300-30 mg Tab Take 1 tablet by mouth every 6 (six) hours as needed.    [DISCONTINUED] diclofenac (VOLTAREN) 50 MG EC tablet Take 1 tablet (50 mg total) by mouth 3 (three) times daily as needed (PAIN).    [DISCONTINUED] glycerin adult (FLEET GLYCERIN, ADULT,) suppository Place 1 suppository rectally as needed for Constipation.    [DISCONTINUED] ketorolac (TORADOL) 10 mg tablet Take 1 tablet (10 mg total) by mouth every 6 (six) hours.     Family History     Problem Relation (Age of Onset)    Diabetes Mother    Heart disease Maternal Grandmother    Hypertension Father        Tobacco Use    Smoking status: Never Smoker    Smokeless tobacco: Never Used   Substance and Sexual Activity    Alcohol use: No    Drug use: No    Sexual activity: No     Review of Systems   Constitution: Positive for diaphoresis.   Eyes: Negative.    Cardiovascular: Positive for chest pain.   Respiratory: Negative.    Endocrine: Negative.    Hematologic/Lymphatic: Negative.    Skin: Negative.    Musculoskeletal: Negative.    Gastrointestinal: Positive for nausea.   Genitourinary: Negative.    Neurological: Negative.    Psychiatric/Behavioral: Negative.    Allergic/Immunologic: Negative.      Objective:     Vital Signs (Most Recent):  Temp: 98.7 °F (37.1 °C) (03/18/19 0743)  Pulse: 92 (03/18/19 1100)  Resp: 20 (03/18/19 0743)  BP: 138/77 (03/18/19 0743)  SpO2: 95 % (03/18/19  0743) Vital Signs (24h Range):  Temp:  [98 °F (36.7 °C)-98.7 °F (37.1 °C)] 98.7 °F (37.1 °C)  Pulse:  [] 92  Resp:  [16-20] 20  SpO2:  [95 %-99 %] 95 %  BP: (138-188)/() 138/77     Weight: 72.6 kg (159 lb 15.1 oz)  Body mass index is 27.45 kg/m².    SpO2: 95 %  O2 Device (Oxygen Therapy): room air    No intake or output data in the 24 hours ending 03/18/19 1458    Lines/Drains/Airways     Peripheral Intravenous Line                 Peripheral IV - Single Lumen 03/18/19 0204 Left Forearm less than 1 day                Physical Exam   Constitutional: He is oriented to person, place, and time. He appears well-developed and well-nourished. No distress.   HENT:   Head: Normocephalic and atraumatic.   Eyes: Pupils are equal, round, and reactive to light. Right eye exhibits no discharge. Left eye exhibits no discharge.   Neck: Neck supple. No JVD present.   Cardiovascular: Normal rate, regular rhythm, S1 normal, S2 normal and normal heart sounds.   No murmur heard.  Pulmonary/Chest: Effort normal and breath sounds normal. No respiratory distress. He has no wheezes. He has no rales.   Abdominal: Soft. He exhibits no distension. There is no tenderness.   Musculoskeletal: He exhibits no edema.   Neurological: He is alert and oriented to person, place, and time.   Skin: Skin is warm and dry. He is not diaphoretic. No erythema.   Psychiatric: He has a normal mood and affect. His behavior is normal. Thought content normal.   Nursing note and vitals reviewed.      Significant Labs:   CMP   Recent Labs   Lab 03/18/19  0237      K 3.8      CO2 26   *   BUN 21*   CREATININE 1.1   CALCIUM 9.5   PROT 7.3   ALBUMIN 4.1   BILITOT 0.2   ALKPHOS 86   AST 28   ALT 85*   ANIONGAP 12   ESTGFRAFRICA >60   EGFRNONAA >60   , CBC   Recent Labs   Lab 03/18/19 0237   WBC 11.10   HGB 16.1   HCT 47.3      , Troponin   Recent Labs   Lab 03/18/19  0237 03/18/19  0446 03/18/19  1108   TROPONINI 0.017 0.068* 2.081*     and All pertinent lab results from the last 24 hours have been reviewed.    Significant Imaging: Echocardiogram: 2D echo with color flow doppler: No results found for this or any previous visit. and X-Ray: CXR: X-Ray Chest 1 View (CXR): No results found for this visit on 03/18/19. and X-Ray Chest PA and Lateral (CXR): No results found for this visit on 03/18/19.    Assessment and Plan:   Patient who presents with chest pain/NSTEMI. Continue same meds. [facility] today, further rec's to follow.    * Chest pain    -See plan under NSTEMI     NSTEMI (non-ST elevated myocardial infarction)    -Patient who presents with substernal chest tightness/pressure radiating to his jaw and left arm along with elevated troponin  -Presentation consistent with NSTEMI  -Continue ASA, Coreg, ACEi, statin  -Start heparin gtt   -2D echo pending  -Check lipid panel  -[facility] today by Dr. Moreno. All risks, benefits, and treatment alternatives explained to patient in detail. All questions answered. He has agreed to proceed.      Diabetes mellitus without complication    -Counseled on risk factor modification  -A1C pending  -Mgmt as per hospital medicine         VTE Risk Mitigation (From admission, onward)        Ordered     heparin 25,000 units in dextrose 5% 250 mL (100 units/mL) infusion LOW INTENSITY nomogram - [facility]  Continuous      03/18/19 1240     heparin 25,000 units in dextrose 5% (100 units/ml) IV bolus from bag - ADDITIONAL PRN BOLUS - 60 units/kg (max bolus 4000 units)  As needed (PRN)      03/18/19 1240     heparin 25,000 units in dextrose 5% (100 units/ml) IV bolus from bag - ADDITIONAL PRN BOLUS - 30 units/kg (max bolus 4000 units)  As needed (PRN)      03/18/19 1240     IP VTE LOW RISK PATIENT  Once      03/18/19 0612     Place sequential compression device  Until discontinued      03/18/19 0612          Thank you for your consult. I will follow-up with patient. Please contact us if you have any additional questions.    Amna GU  JUMANA Butcher  Cardiology   [facility]    Chart reviewed. Dr. Moreno examined patient and agrees with plan as outlined above.

## 2019-03-19 ENCOUNTER — TELEPHONE (OUTPATIENT)
Dept: CARDIOLOGY | Facility: CLINIC | Age: 34
End: 2019-03-19

## 2019-03-19 VITALS
OXYGEN SATURATION: 97 % | HEIGHT: 64 IN | TEMPERATURE: 98 F | DIASTOLIC BLOOD PRESSURE: 87 MMHG | SYSTOLIC BLOOD PRESSURE: 127 MMHG | BODY MASS INDEX: 27.43 KG/M2 | WEIGHT: 160.69 LBS | HEART RATE: 83 BPM | RESPIRATION RATE: 17 BRPM

## 2019-03-19 PROBLEM — I25.110 ATHEROSCLEROSIS OF NATIVE CORONARY ARTERY OF NATIVE HEART WITH UNSTABLE ANGINA PECTORIS: Status: ACTIVE | Noted: 2019-03-19

## 2019-03-19 PROBLEM — I25.10 ATHEROSCLEROSIS OF NATIVE CORONARY ARTERY OF NATIVE HEART: Status: ACTIVE | Noted: 2019-03-19

## 2019-03-19 PROBLEM — Z95.5 S/P CORONARY ARTERY STENT PLACEMENT: Status: ACTIVE | Noted: 2019-03-19

## 2019-03-19 PROBLEM — R07.9 CHEST PAIN: Status: RESOLVED | Noted: 2019-03-18 | Resolved: 2019-03-19

## 2019-03-19 LAB
ALBUMIN SERPL BCP-MCNC: 3.5 G/DL
ALP SERPL-CCNC: 69 U/L
ALT SERPL W/O P-5'-P-CCNC: 95 U/L
ANION GAP SERPL CALC-SCNC: 8 MMOL/L
ANION GAP SERPL CALC-SCNC: 8 MMOL/L
AST SERPL-CCNC: 57 U/L
BASOPHILS # BLD AUTO: 0.02 K/UL
BASOPHILS NFR BLD: 0.2 %
BILIRUB SERPL-MCNC: 0.2 MG/DL
BUN SERPL-MCNC: 14 MG/DL
BUN SERPL-MCNC: 14 MG/DL
CALCIUM SERPL-MCNC: 8.9 MG/DL
CALCIUM SERPL-MCNC: 9 MG/DL
CHLORIDE SERPL-SCNC: 106 MMOL/L
CHLORIDE SERPL-SCNC: 106 MMOL/L
CO2 SERPL-SCNC: 26 MMOL/L
CO2 SERPL-SCNC: 26 MMOL/L
CORONARY STENOSIS: ABNORMAL
CORONARY STENT: YES
CREAT SERPL-MCNC: 1 MG/DL
CREAT SERPL-MCNC: 1 MG/DL
DIFFERENTIAL METHOD: ABNORMAL
EOSINOPHIL # BLD AUTO: 0.1 K/UL
EOSINOPHIL NFR BLD: 1.1 %
ERYTHROCYTE [DISTWIDTH] IN BLOOD BY AUTOMATED COUNT: 12.4 %
EST. GFR  (AFRICAN AMERICAN): >60 ML/MIN/1.73 M^2
EST. GFR  (AFRICAN AMERICAN): >60 ML/MIN/1.73 M^2
EST. GFR  (NON AFRICAN AMERICAN): >60 ML/MIN/1.73 M^2
EST. GFR  (NON AFRICAN AMERICAN): >60 ML/MIN/1.73 M^2
GLUCOSE SERPL-MCNC: 156 MG/DL
GLUCOSE SERPL-MCNC: 157 MG/DL
HCT VFR BLD AUTO: 45 %
HGB BLD-MCNC: 14.6 G/DL
LYMPHOCYTES # BLD AUTO: 4.3 K/UL
LYMPHOCYTES NFR BLD: 36 %
MCH RBC QN AUTO: 31.6 PG
MCHC RBC AUTO-ENTMCNC: 32.4 G/DL
MCV RBC AUTO: 97 FL
MONOCYTES # BLD AUTO: 0.9 K/UL
MONOCYTES NFR BLD: 7.1 %
NEUTROPHILS # BLD AUTO: 6.7 K/UL
NEUTROPHILS NFR BLD: 55.6 %
PLATELET # BLD AUTO: 286 K/UL
PMV BLD AUTO: 9.1 FL
POCT GLUCOSE: 139 MG/DL (ref 70–110)
POCT GLUCOSE: 148 MG/DL (ref 70–110)
POTASSIUM SERPL-SCNC: 3.9 MMOL/L
POTASSIUM SERPL-SCNC: 3.9 MMOL/L
PROT SERPL-MCNC: 6.1 G/DL
RBC # BLD AUTO: 4.62 M/UL
SODIUM SERPL-SCNC: 140 MMOL/L
SODIUM SERPL-SCNC: 140 MMOL/L
TROPONIN I SERPL DL<=0.01 NG/ML-MCNC: 7.9 NG/ML
WBC # BLD AUTO: 11.96 K/UL

## 2019-03-19 PROCEDURE — 99232 SBSQ HOSP IP/OBS MODERATE 35: CPT | Mod: ,,, | Performed by: INTERNAL MEDICINE

## 2019-03-19 PROCEDURE — 25000003 PHARM REV CODE 250: Performed by: INTERNAL MEDICINE

## 2019-03-19 PROCEDURE — 80053 COMPREHEN METABOLIC PANEL: CPT

## 2019-03-19 PROCEDURE — 25000003 PHARM REV CODE 250: Performed by: NURSE PRACTITIONER

## 2019-03-19 PROCEDURE — 85025 COMPLETE CBC W/AUTO DIFF WBC: CPT

## 2019-03-19 PROCEDURE — 80048 BASIC METABOLIC PNL TOTAL CA: CPT

## 2019-03-19 PROCEDURE — 99232 PR SUBSEQUENT HOSPITAL CARE,LEVL II: ICD-10-PCS | Mod: ,,, | Performed by: INTERNAL MEDICINE

## 2019-03-19 PROCEDURE — 63600175 PHARM REV CODE 636 W HCPCS: Mod: JG | Performed by: INTERNAL MEDICINE

## 2019-03-19 PROCEDURE — 84484 ASSAY OF TROPONIN QUANT: CPT

## 2019-03-19 RX ORDER — CARVEDILOL 6.25 MG/1
6.25 TABLET ORAL 2 TIMES DAILY
Qty: 60 TABLET | Refills: 1 | Status: SHIPPED | OUTPATIENT
Start: 2019-03-19 | End: 2019-04-01 | Stop reason: SDUPTHER

## 2019-03-19 RX ORDER — LISINOPRIL 10 MG/1
10 TABLET ORAL DAILY
Qty: 90 TABLET | Refills: 1 | Status: SHIPPED | OUTPATIENT
Start: 2019-03-20 | End: 2019-06-05

## 2019-03-19 RX ORDER — NAPROXEN SODIUM 220 MG/1
81 TABLET, FILM COATED ORAL DAILY
Refills: 0 | COMMUNITY
Start: 2019-03-20 | End: 2020-10-27 | Stop reason: SDUPTHER

## 2019-03-19 RX ORDER — ATORVASTATIN CALCIUM 40 MG/1
40 TABLET, FILM COATED ORAL DAILY
Qty: 90 TABLET | Refills: 1 | Status: SHIPPED | OUTPATIENT
Start: 2019-03-20 | End: 2020-03-02 | Stop reason: SDUPTHER

## 2019-03-19 RX ORDER — METFORMIN HYDROCHLORIDE 500 MG/1
1000 TABLET ORAL 2 TIMES DAILY WITH MEALS
Qty: 120 TABLET | Refills: 0 | Status: SHIPPED | OUTPATIENT
Start: 2019-03-19 | End: 2020-03-02 | Stop reason: SDUPTHER

## 2019-03-19 RX ORDER — RANOLAZINE 500 MG/1
500 TABLET, EXTENDED RELEASE ORAL 2 TIMES DAILY
Qty: 60 TABLET | Refills: 1 | Status: SHIPPED | OUTPATIENT
Start: 2019-03-19 | End: 2020-03-02 | Stop reason: SDUPTHER

## 2019-03-19 RX ADMIN — ASPIRIN 81 MG CHEWABLE TABLET 81 MG: 81 TABLET CHEWABLE at 08:03

## 2019-03-19 RX ADMIN — TICAGRELOR 90 MG: 90 TABLET ORAL at 08:03

## 2019-03-19 RX ADMIN — TIROFIBAN 0.15 MCG/KG/MIN: 5 INJECTION, SOLUTION INTRAVENOUS at 08:03

## 2019-03-19 RX ADMIN — CARVEDILOL 6.25 MG: 6.25 TABLET, FILM COATED ORAL at 08:03

## 2019-03-19 RX ADMIN — LISINOPRIL 10 MG: 10 TABLET ORAL at 08:03

## 2019-03-19 RX ADMIN — RANOLAZINE 500 MG: 500 TABLET, FILM COATED, EXTENDED RELEASE ORAL at 08:03

## 2019-03-19 RX ADMIN — ATORVASTATIN CALCIUM 40 MG: 40 TABLET, FILM COATED ORAL at 08:03

## 2019-03-19 NOTE — ASSESSMENT & PLAN NOTE
-Patient who presents with substernal chest tightness/pressure radiating to his jaw and left arm along with elevated troponin  -Presentation consistent with NSTEMI  -Continue ASA, Coreg, ACEi, statin  -Start heparin gtt   -2D echo pending  -Check lipid panel  -[facility] today by Dr. Moreno. All risks, benefits, and treatment alternatives explained to patient in detail. All questions answered. He has agreed to proceed.     3/19/19  -s/p [facility] yesterday with PCI of LCX  -Stable overnight, no chest pain or SOB  -Continue ASA, Brilinta, ACEi, statin, Ranexa  -Counseled on importance of DAPT  -Follow-up next week in clinic

## 2019-03-19 NOTE — NURSING
Tele discontinued. PIV discontinued. Discharge teaching completed. Questions answered. Patient discharging home with mom via private vehicle. Follow up appts and prescriptions discussed.

## 2019-03-19 NOTE — DISCHARGE SUMMARY
[facility] Hospital Medicine  Discharge Summary      Patient Name: Peewee Soto Jr.  MRN: [id number]  Admission Date: 3/18/2019  Hospital Length of Stay: 1 days  Discharge Date and Time:  03/19/2019 3:08 PM  Attending Physician: Jessi att. providers found   Discharging Provider: Vijay Chin NP  Primary Care Provider: Primary Doctor Jessi      HPI:   32 y/o male with PMHx of DM and GERD that presented to the ED with c/o chest pain that onset gradually last night. The pain is described as pressure, that radiated to the L shoulder, that worsened as he slept, and resolved spontaneously after arrival to the ED. Associated symptoms include emesis without nausea, and diaphoresis. Symptoms are intermittent and moderate in nature. No exacerbating or mitigating factors reported. Pt denies fever, chills, SOB, palpitations, leg edema, headache, nausea, diarrhea, dizziness, and all other symptoms at this time. He is a full code and his surrogate decision maker is his mother, Amanda Daly.    Procedure(s) (LRB):  CATHETERIZATION, HEART, LEFT (Left)      Hospital Course:   Peewee Soto is a 33 year old male on observation due to NSTEMI. Troponin measurements were 2.081, 10.913, and 7.898. Patient underwent heart cath which showed single vessel CAD in left circumflex. Stent placed successfully without complication. Patient is currently sitting in chair comfortably in no distress. He understands that he will need to follow up with cardiology for his postop visit. He does not have health insurance so he will also follow up with [facility] in [place] for PCP management. Cardiology recommends stable for discharge with BB, ASA, Brilinta, ACE-i, statin, and metformin. Patient examined today and deemed stable for discharge. Patient agrees with plan.      Consults:   Consults (From admission, onward)        Status Ordering Provider     Inpatient consult to Cardiology  Once     Provider:  Bill Moreno MD     Completed RAQUEL CONTRERAS     Inpatient consult to Social Work  Once     Provider:  (Not yet assigned)    Completed RAQUEL CONTRERAS          No new Assessment & Plan notes have been filed under this hospital service since the last note was generated.  Service: Hospital Medicine    Final Active Diagnoses:    Diagnosis Date Noted POA    PRINCIPAL PROBLEM:  NSTEMI (non-ST elevated myocardial infarction) [I21.4] 03/18/2019 Yes    Atherosclerosis of native coronary artery of native heart [I25.10] 03/19/2019 Yes    S/P coronary artery stent placement [Z95.5] 03/19/2019 Not Applicable    Diabetes mellitus without complication [E11.9] 03/18/2019 Yes      Problems Resolved During this Admission:    Diagnosis Date Noted Date Resolved POA    Chest pain [R07.9] 03/18/2019 03/19/2019 Yes       Discharged Condition: stable    Disposition: Home or Self Care    Follow Up:  Follow-up Information     [facility]. Go on 3/29/2019.    Why:  Appointment time @ 3:00 pm. If this day/time no longer work, please call [facility] to reschedule  Contact information:  [address] [place] [id number]  Phone: [phone]             Amna Btucher PA-C In 1 week.    Specialty:  Cardiology  Contact information:  [address] [place] [address] [address] [place] [address] [phone]                 Patient Instructions:   Diabetic diet  Understanding diabetes  Diabetes and heart disease  Coronary artery disease, understanding    Significant Diagnostic Studies:   Imaging Results          X-Ray Chest AP Portable (Final result)  Result time 03/18/19 07:40:49    Final result by Sang Oneal MD (03/18/19 07:40:49)                 Impression:      No acute process seen.      Electronically signed by: Sang Oneal MD  Date:    03/18/2019  Time:    07:40             Narrative:    EXAMINATION:  XR CHEST AP PORTABLE    CLINICAL HISTORY:  Chest Pain;    FINDINGS:  Single view of the chest.    Cardiac silhouette is normal.  The lungs  demonstrate no evidence of active disease.  No evidence of pleural effusion or pneumothorax.  Bones appear intact.                                  Pending Diagnostic Studies:     Procedure Component Value Units Date/Time    IR Heart Cath Images [[id number]] Resulted:  03/18/19 1545    Order Status:  Sent Lab Status:  In process Updated:  03/18/19 1730    Troponin I #2 [[id number]] Collected:  03/18/19 0237    Order Status:  Sent Lab Status:  In process Updated:  03/18/19 0238    Specimen:  Blood          Medications:  Reconciled Home Medications:      Medication List      START taking these medications    aspirin 81 MG Chew  Take 1 tablet (81 mg total) by mouth once daily.  Start taking on:  3/20/2019     atorvastatin 40 MG tablet  Commonly known as:  LIPITOR  Take 1 tablet (40 mg total) by mouth once daily.  Start taking on:  3/20/2019     carvedilol 6.25 MG tablet  Commonly known as:  COREG  Take 1 tablet (6.25 mg total) by mouth 2 (two) times daily.     lisinopril 10 MG tablet  Take 1 tablet (10 mg total) by mouth once daily.  Start taking on:  3/20/2019     ranolazine 500 MG Tb12  Commonly known as:  RANEXA  Take 1 tablet (500 mg total) by mouth 2 (two) times daily.     ticagrelor 90 mg tablet  Commonly known as:  BRILINTA  Take 1 tablet (90 mg total) by mouth 2 (two) times daily.        CHANGE how you take these medications    metFORMIN 500 MG tablet  Commonly known as:  GLUCOPHAGE  Take 2 tablets (1,000 mg total) by mouth 2 (two) times daily with meals.  What changed:  how much to take        CONTINUE taking these medications    esomeprazole 20 MG capsule  Commonly known as:  NEXIUM  Take 20 mg by mouth before breakfast.        STOP taking these medications    acetaminophen-codeine 300-30mg 300-30 mg Tab  Commonly known as:  TYLENOL-CODEINE #3     diclofenac 50 MG EC tablet  Commonly known as:  VOLTAREN     glycerin adult suppository  Commonly known as:  FLEET GLYCERIN (ADULT)     ketorolac 10 mg  tablet  Commonly known as:  TORADOL            Indwelling Lines/Drains at time of discharge:   Lines/Drains/Airways          None          Time spent on the discharge of patient: >30 minutes  Patient was seen and examined on the date of discharge and determined to be suitable for discharge.         Vijay Chin NP  Department of Hospital Medicine  [facility]

## 2019-03-19 NOTE — HOSPITAL COURSE
3/19/19-Patient seen and examined today, s/p [facility] yesterday with PCI of LCX. Feels well. No chest pain or SOB. Labs stable. No radial access site complaints.

## 2019-03-19 NOTE — PLAN OF CARE
[facility]  Per Rosa, patient refused screening for Medicaid

## 2019-03-19 NOTE — NURSING
Patient assessed for diabetes educational needs following chart review  He reports was diagnosed over 3 years ago  He does not have a glucose monitor, but will test with a neighbor's at times  provided with a Contour next glucose monitor and reviewed use.  Recommended he test 1 time daily to conserve strip use.  Recommended he alternate testing times pre-meals/bedtime and record results for physician visits  He was prescribed metformin in the past.  He does not have a PCP and ran out of med's    Discussed importance of establishing a PCP and this has been addressed per case management  Instructed in target glucose values, hyper/hypoglycemia  He verbalizes understanding  Literature provided

## 2019-03-19 NOTE — PROGRESS NOTES
"[facility]  Cardiology  Progress Note    Patient Name: Peewee Soto Jr.  MRN: [id number]  Admission Date: 3/18/2019  Hospital Length of Stay: 1 days  Code Status: Full Code   Attending Physician: Marisela Richard MD   Primary Care Physician: Primary Doctor No  Expected Discharge Date: 3/19/2019  Principal Problem:NSTEMI (non-ST elevated myocardial infarction)    Subjective:   HPI:  Mr. Soto is a 33 year old male patient with a PMHx of DM (non-compliant with medication) and GERD who presented to [facility] ED this AM with a chief complaint of substernal chest pressure/tightness that began yesterday morning. Patient reported the pain radiated upward toward his jaw and down his left arm and was associated with diaphoresis and nausea. He denied any associated SOB, fever, chills, palpitations, near syncope, or syncope. Initial workup in ED negative and patient subsequently admitted overnight for further evaluation. Repeat troponin this AM positive and cardiology consulted to assist with management. Patient seen and examined today, sitting in bed. Still complains of mild, constant substernal chest tightness. States it has improved from yesterday but is still present. Denies any other symptoms at time of exam. No prior cardiac history. Not on any meds at home. No recent febrile or viral illness. States he has not been followed by a PCP in "quite some time". Chart reviewed. Troponin 0.017>0.068>2.081. EKG does not show any acute ischemic changes. 2D echo pending.     Hospital Course:   3/19/19-Patient seen and examined today, s/p [facility] yesterday with PCI of LCX. Feels well. No chest pain or SOB. Labs stable. No radial access site complaints.         Review of Systems   Constitution: Negative.   HENT: Negative.    Eyes: Negative.    Cardiovascular: Negative.    Respiratory: Negative.    Endocrine: Negative.    Hematologic/Lymphatic: Negative.    Skin: Negative.    Musculoskeletal: Negative.  "   Gastrointestinal: Negative.    Genitourinary: Negative.    Neurological: Negative.    Psychiatric/Behavioral: Negative.    Allergic/Immunologic: Negative.      Objective:     Vital Signs (Most Recent):  Temp: 98.3 °F (36.8 °C) (03/19/19 1126)  Pulse: 83 (03/19/19 1126)  Resp: 17 (03/19/19 1126)  BP: 127/87 (03/19/19 1126)  SpO2: 97 % (03/19/19 1126) Vital Signs (24h Range):  Temp:  [97.7 °F (36.5 °C)-99.1 °F (37.3 °C)] 98.3 °F (36.8 °C)  Pulse:  [67-98] 83  Resp:  [16-25] 17  SpO2:  [94 %-99 %] 97 %  BP: (102-147)/(64-96) 127/87     Weight: 72.9 kg (160 lb 11.5 oz)  Body mass index is 27.59 kg/m².     SpO2: 97 %  O2 Device (Oxygen Therapy): room air      Intake/Output Summary (Last 24 hours) at 3/19/2019 1147  Last data filed at 3/19/2019 0805  Gross per 24 hour   Intake 987.33 ml   Output --   Net 987.33 ml       Lines/Drains/Airways     Peripheral Intravenous Line                 Peripheral IV - Single Lumen 03/18/19 0204 Left Forearm 1 day                Physical Exam   Constitutional: He is oriented to person, place, and time. He appears well-developed and well-nourished. No distress.   HENT:   Head: Normocephalic and atraumatic.   Eyes: Pupils are equal, round, and reactive to light. Right eye exhibits no discharge. Left eye exhibits no discharge.   Neck: Neck supple. No JVD present.   Cardiovascular: Normal rate, regular rhythm, S1 normal, S2 normal and normal heart sounds.   No murmur heard.  Pulmonary/Chest: Effort normal and breath sounds normal. No respiratory distress. He has no wheezes. He has no rales.   Abdominal: Soft. He exhibits no distension. There is no tenderness.   Musculoskeletal: He exhibits no edema.   Neurological: He is alert and oriented to person, place, and time.   Skin: Skin is warm and dry. He is not diaphoretic. No erythema.   Left radial access site C/D/I; no bleeding erythema or drainage   Psychiatric: He has a normal mood and affect. His behavior is normal. Thought content  normal.   Nursing note and vitals reviewed.      Significant Labs:   CMP   Recent Labs   Lab 03/18/19  0237 03/19/19  0449    140  140   K 3.8 3.9  3.9    106  106   CO2 26 26  26   * 157*  156*   BUN 21* 14  14   CREATININE 1.1 1.0  1.0   CALCIUM 9.5 9.0  8.9   PROT 7.3 6.1   ALBUMIN 4.1 3.5   BILITOT 0.2 0.2   ALKPHOS 86 69   AST 28 57*   ALT 85* 95*   ANIONGAP 12 8  8   ESTGFRAFRICA >60 >60  >60   EGFRNONAA >60 >60  >60   , CBC   Recent Labs   Lab 03/18/19  0237 03/19/19  0449   WBC 11.10 11.96   HGB 16.1 14.6   HCT 47.3 45.0    286   , Troponin   Recent Labs   Lab 03/18/19  1108 03/18/19  1804 03/19/19  0449   TROPONINI 2.081* 10.913* 7.898*    and All pertinent lab results from the last 24 hours have been reviewed.    Significant Imaging: Echocardiogram:   2D echo with color flow doppler:   Results for orders placed or performed during the hospital encounter of 03/18/19   2D echo with color flow doppler   Result Value Ref Range    QEF 55 55 - 65    Diastolic Dysfunction No    , EKG: Reviewed and X-Ray: CXR: X-Ray Chest 1 View (CXR): No results found for this visit on 03/18/19. and X-Ray Chest PA and Lateral (CXR): No results found for this visit on 03/18/19.    Assessment and Plan:   Patient who presents with NSTEMI s/p [facility] with PCI of LCX. Stable overnight. Discharge home on ASA, Brilinta, ACEi, statin, Ranexa. Follow-up in clinic.     * NSTEMI (non-ST elevated myocardial infarction)    -Patient who presents with substernal chest tightness/pressure radiating to his jaw and left arm along with elevated troponin  -Presentation consistent with NSTEMI  -Continue ASA, Coreg, ACEi, statin, Ranexa  -Start heparin gtt   -2D echo pending  -Check lipid panel  -[facility] today by Dr. Moreno. All risks, benefits, and treatment alternatives explained to patient in detail. All questions answered. He has agreed to proceed.     3/19/19  -s/p [facility] yesterday with PCI of LCX  -Stable overnight, no  chest pain or SOB  -Continue ASA, Brilinta, ACEi, statin, Ranexa  -Counseled on importance of DAPT  -Follow-up next week in clinic     Diabetes mellitus without complication    -Counseled on risk factor modification  -A1C pending  -Mgmt as per hospital medicine     Chest pain    -See plan under NSTEMI         VTE Risk Mitigation (From admission, onward)        Ordered     IP VTE LOW RISK PATIENT  Once      03/18/19 0612     Place sequential compression device  Until discontinued      03/18/19 0612          Amna Butcher PA-C  Cardiology  [facility]    Chart reviewed. Dr. Oh examined patient and agrees with plan as outlined above.

## 2019-03-19 NOTE — PLAN OF CARE
Problem: Adult Inpatient Plan of Care  Goal: Plan of Care Review  Outcome: Ongoing (interventions implemented as appropriate)   03/18/19 5394   Plan of Care Review   Plan of Care Reviewed With patient   Outcome Summary Pt resting comfortably. Bed in low position, lighting adjusted, personal items within reach, vitals WNL, Meds administered

## 2019-03-19 NOTE — TELEPHONE ENCOUNTER
Spoke with [facility] f/u has been scheduled for Monday 3/25/19 Critical access hospital clinic.

## 2019-03-19 NOTE — SUBJECTIVE & OBJECTIVE
Review of Systems   Constitution: Negative.   HENT: Negative.    Eyes: Negative.    Cardiovascular: Negative.    Respiratory: Negative.    Endocrine: Negative.    Hematologic/Lymphatic: Negative.    Skin: Negative.    Musculoskeletal: Negative.    Gastrointestinal: Negative.    Genitourinary: Negative.    Neurological: Negative.    Psychiatric/Behavioral: Negative.    Allergic/Immunologic: Negative.      Objective:     Vital Signs (Most Recent):  Temp: 98.3 °F (36.8 °C) (03/19/19 1126)  Pulse: 83 (03/19/19 1126)  Resp: 17 (03/19/19 1126)  BP: 127/87 (03/19/19 1126)  SpO2: 97 % (03/19/19 1126) Vital Signs (24h Range):  Temp:  [97.7 °F (36.5 °C)-99.1 °F (37.3 °C)] 98.3 °F (36.8 °C)  Pulse:  [67-98] 83  Resp:  [16-25] 17  SpO2:  [94 %-99 %] 97 %  BP: (102-147)/(64-96) 127/87     Weight: 72.9 kg (160 lb 11.5 oz)  Body mass index is 27.59 kg/m².     SpO2: 97 %  O2 Device (Oxygen Therapy): room air      Intake/Output Summary (Last 24 hours) at 3/19/2019 1147  Last data filed at 3/19/2019 0805  Gross per 24 hour   Intake 987.33 ml   Output --   Net 987.33 ml       Lines/Drains/Airways     Peripheral Intravenous Line                 Peripheral IV - Single Lumen 03/18/19 0204 Left Forearm 1 day                Physical Exam   Constitutional: He is oriented to person, place, and time. He appears well-developed and well-nourished. No distress.   HENT:   Head: Normocephalic and atraumatic.   Eyes: Pupils are equal, round, and reactive to light. Right eye exhibits no discharge. Left eye exhibits no discharge.   Neck: Neck supple. No JVD present.   Cardiovascular: Normal rate, regular rhythm, S1 normal, S2 normal and normal heart sounds.   No murmur heard.  Pulmonary/Chest: Effort normal and breath sounds normal. No respiratory distress. He has no wheezes. He has no rales.   Abdominal: Soft. He exhibits no distension. There is no tenderness.   Musculoskeletal: He exhibits no edema.   Neurological: He is alert and oriented to  person, place, and time.   Skin: Skin is warm and dry. He is not diaphoretic. No erythema.   Left radial access site C/D/I; no bleeding erythema or drainage   Psychiatric: He has a normal mood and affect. His behavior is normal. Thought content normal.   Nursing note and vitals reviewed.      Significant Labs:   CMP   Recent Labs   Lab 03/18/19  0237 03/19/19  0449    140  140   K 3.8 3.9  3.9    106  106   CO2 26 26  26   * 157*  156*   BUN 21* 14  14   CREATININE 1.1 1.0  1.0   CALCIUM 9.5 9.0  8.9   PROT 7.3 6.1   ALBUMIN 4.1 3.5   BILITOT 0.2 0.2   ALKPHOS 86 69   AST 28 57*   ALT 85* 95*   ANIONGAP 12 8  8   ESTGFRAFRICA >60 >60  >60   EGFRNONAA >60 >60  >60   , CBC   Recent Labs   Lab 03/18/19  0237 03/19/19  0449   WBC 11.10 11.96   HGB 16.1 14.6   HCT 47.3 45.0    286   , Troponin   Recent Labs   Lab 03/18/19  1108 03/18/19  1804 03/19/19  0449   TROPONINI 2.081* 10.913* 7.898*    and All pertinent lab results from the last 24 hours have been reviewed.    Significant Imaging: Echocardiogram:   2D echo with color flow doppler:   Results for orders placed or performed during the hospital encounter of 03/18/19   2D echo with color flow doppler   Result Value Ref Range    QEF 55 55 - 65    Diastolic Dysfunction No    , EKG: Reviewed and X-Ray: CXR: X-Ray Chest 1 View (CXR): No results found for this visit on 03/18/19. and X-Ray Chest PA and Lateral (CXR): No results found for this visit on 03/18/19.

## 2019-03-19 NOTE — PLAN OF CARE
Mar[facility] Go to [facility]   Friday Mar 29, 2019   Appointment time @ 3:00 pm. If this day/time no longer work, please call [facility] to reschedule  [address] [place] [address]   Phone: [phone]          03/19/19 1012   Final Note   Assessment Type Final Discharge Note   Anticipated Discharge Disposition Home   Hospital Follow Up  Appt(s) scheduled? Yes   Right Care Referral Info   Post Acute Recommendation No Care

## 2019-03-19 NOTE — HOSPITAL COURSE
Peewee Soto is a 33 year old male on observation due to NSTEMI. Troponin measurements were 2.081, 10.913, and 7.898. Patient underwent heart cath which showed single vessel CAD in left circumflex. Stent placed successfully without complication. Patient is currently sitting in chair comfortably in no distress. He understands that he will need to follow up with cardiology for his postop visit. He does not have health insurance so he will also follow up with [facility] in [place] for PCP management. Cardiology recommends stable for discharge with BB, ASA, Brilinta, ACE-i, statin, and metformin. Patient examined today and deemed stable for discharge. Patient agrees with plan.

## 2019-03-19 NOTE — TELEPHONE ENCOUNTER
----- Message from Amna Butcher PA-C sent at 3/19/2019 11:50 AM CDT -----  In room 247, needs hospital f/u with me next week, you can double book at [facility] if needed    Thanks

## 2019-03-20 LAB
POC ACTIVATED CLOTTING TIME K: 224 SEC (ref 74–137)
POC ACTIVATED CLOTTING TIME K: 307 SEC (ref 74–137)
SAMPLE: ABNORMAL
SAMPLE: ABNORMAL

## 2019-03-21 ENCOUNTER — HOSPITAL ENCOUNTER (EMERGENCY)
Facility: HOSPITAL | Age: 34
Discharge: HOME | End: 2019-03-21
Attending: EMERGENCY MEDICINE
Payer: COMMERCIAL

## 2019-03-21 VITALS
HEIGHT: 64 IN | BODY MASS INDEX: 26.84 KG/M2 | HEART RATE: 79 BPM | DIASTOLIC BLOOD PRESSURE: 66 MMHG | OXYGEN SATURATION: 97 % | SYSTOLIC BLOOD PRESSURE: 109 MMHG | WEIGHT: 157.19 LBS | RESPIRATION RATE: 18 BRPM | TEMPERATURE: 98 F

## 2019-03-21 DIAGNOSIS — R06.02 SHORTNESS OF BREATH: ICD-10-CM

## 2019-03-21 DIAGNOSIS — R06.02 SOB (SHORTNESS OF BREATH): ICD-10-CM

## 2019-03-21 LAB
ALBUMIN SERPL BCP-MCNC: 4.2 G/DL
ALP SERPL-CCNC: 86 U/L
ALT SERPL W/O P-5'-P-CCNC: 65 U/L
ANION GAP SERPL CALC-SCNC: 12 MMOL/L
APTT BLDCRRT: 29.5 SEC
AST SERPL-CCNC: 18 U/L
BASOPHILS # BLD AUTO: 0.02 K/UL
BASOPHILS NFR BLD: 0.1 %
BILIRUB SERPL-MCNC: 1 MG/DL
BNP SERPL-MCNC: 22 PG/ML
BUN SERPL-MCNC: 24 MG/DL
CALCIUM SERPL-MCNC: 10.1 MG/DL
CHLORIDE SERPL-SCNC: 100 MMOL/L
CO2 SERPL-SCNC: 24 MMOL/L
CREAT SERPL-MCNC: 1.3 MG/DL
DIFFERENTIAL METHOD: ABNORMAL
EOSINOPHIL # BLD AUTO: 0.1 K/UL
EOSINOPHIL NFR BLD: 0.8 %
ERYTHROCYTE [DISTWIDTH] IN BLOOD BY AUTOMATED COUNT: 12.2 %
EST. GFR  (AFRICAN AMERICAN): >60 ML/MIN/1.73 M^2
EST. GFR  (NON AFRICAN AMERICAN): >60 ML/MIN/1.73 M^2
GLUCOSE SERPL-MCNC: 159 MG/DL
HCT VFR BLD AUTO: 46.3 %
HGB BLD-MCNC: 16 G/DL
INR PPP: 0.9
LYMPHOCYTES # BLD AUTO: 2.6 K/UL
LYMPHOCYTES NFR BLD: 16.5 %
MCH RBC QN AUTO: 32.7 PG
MCHC RBC AUTO-ENTMCNC: 34.6 G/DL
MCV RBC AUTO: 95 FL
MONOCYTES # BLD AUTO: 0.9 K/UL
MONOCYTES NFR BLD: 5.8 %
NEUTROPHILS # BLD AUTO: 12.1 K/UL
NEUTROPHILS NFR BLD: 76.8 %
PLATELET # BLD AUTO: 319 K/UL
PMV BLD AUTO: 9.3 FL
POTASSIUM SERPL-SCNC: 4 MMOL/L
PROT SERPL-MCNC: 7.7 G/DL
PROTHROMBIN TIME: 10.3 SEC
RBC # BLD AUTO: 4.89 M/UL
SODIUM SERPL-SCNC: 136 MMOL/L
TROPONIN I SERPL DL<=0.01 NG/ML-MCNC: 2.5 NG/ML
WBC # BLD AUTO: 15.75 K/UL

## 2019-03-21 PROCEDURE — 93010 ELECTROCARDIOGRAM REPORT: CPT | Mod: ,,, | Performed by: INTERNAL MEDICINE

## 2019-03-21 PROCEDURE — 80053 COMPREHEN METABOLIC PANEL: CPT

## 2019-03-21 PROCEDURE — 93010 EKG 12-LEAD: ICD-10-PCS | Mod: ,,, | Performed by: INTERNAL MEDICINE

## 2019-03-21 PROCEDURE — 99285 EMERGENCY DEPT VISIT HI MDM: CPT | Mod: 25

## 2019-03-21 PROCEDURE — 85730 THROMBOPLASTIN TIME PARTIAL: CPT

## 2019-03-21 PROCEDURE — 85025 COMPLETE CBC W/AUTO DIFF WBC: CPT

## 2019-03-21 PROCEDURE — 84484 ASSAY OF TROPONIN QUANT: CPT

## 2019-03-21 PROCEDURE — 85610 PROTHROMBIN TIME: CPT

## 2019-03-21 PROCEDURE — 83880 ASSAY OF NATRIURETIC PEPTIDE: CPT

## 2019-03-21 PROCEDURE — 93005 ELECTROCARDIOGRAM TRACING: CPT

## 2019-03-21 NOTE — ED PROVIDER NOTES
SCRIBE #1 NOTE: I, Marlen Matthews, am scribing for, and in the presence of, Alfredo Gallegos MD. I have scribed the entire note.      History      Chief Complaint   Patient presents with    Shortness of Breath     Patient reports he was seen here recent ly andfor a heart atteck, now he feels short of breath while laying down.        Review of patient's allergies indicates:   Allergen Reactions    Pcn [penicillins] Rash        HPI   HPI    3/21/2019, 2:26 AM   History obtained from the patient      History of Present Illness: Peewee Soto Jr. is a 33 y.o. male patient with a PMHx of DM and GERD who presents to the Emergency Department for SOB which onset gradually tonight. Pt was evaluated on 3/18/19 (discharged 3/19/19) for CP and was admitted for NSTEMI. Pt had a heart cath and sent placement performed during admission. Symptoms are constant and moderate in severity. Sxs exacerbated with lying flat. No mitigating factors reported. Associated sxs include vomiting x1 PTA. Pt denies any nausea at this time. Patient denies any CP, diaphoresis, palpitations, extremity weakness/numbness, leg pain/swelling, dizziness, cough, fever, chills, and all other sxs at this time. No further complaints or concerns at this time.         Arrival mode: Personal vehicle     PCP: Primary Doctor No       Past Medical History:  Past Medical History:   Diagnosis Date    Coronary artery disease involving native coronary artery of native heart     Diabetes mellitus     GERD (gastroesophageal reflux disease)     NSTEMI (non-ST elevated myocardial infarction)     S/P coronary artery stent placement     Undescended testicle before puberty        Past Surgical History:  Past Surgical History:   Procedure Laterality Date    APPENDECTOMY N/A 11/11/2015    Performed by [facility]valdo Guy MD at [facility] OR    CORONARY STENT PLACEMENT      HERNIA REPAIR           Family History:  Family History   Problem Relation Age of Onset    Diabetes  Mother     Hypertension Father     Heart disease Maternal Grandmother        Social History:  Social History     Tobacco Use    Smoking status: Never Smoker    Smokeless tobacco: Never Used   Substance and Sexual Activity    Alcohol use: No    Drug use: No    Sexual activity: No       ROS   Review of Systems   Constitutional: Negative for chills, diaphoresis and fever.   HENT: Negative for sore throat.    Respiratory: Positive for shortness of breath. Negative for cough.    Cardiovascular: Negative for chest pain, palpitations and leg swelling.   Gastrointestinal: Positive for vomiting (x1). Negative for abdominal pain and nausea.   Genitourinary: Negative for dysuria.   Musculoskeletal: Negative for back pain and myalgias.   Skin: Negative for rash.   Neurological: Negative for dizziness, weakness and numbness.   All other systems reviewed and are negative.      Physical Exam      Initial Vitals [03/21/19 0212]   BP Pulse Resp Temp SpO2   (!) 142/88 99 18 98.2 °F (36.8 °C) 98 %      MAP       --          Physical Exam  Nursing Notes and Vital Signs Reviewed.  Constitutional: Patient is in no acute distress. Well-developed and well-nourished.  Head: Atraumatic. Normocephalic.  Eyes: PERRL. EOM intact. Conjunctivae are not pale. No scleral icterus.  ENT: Mucous membranes are moist. Oropharynx is clear and symmetric.    Neck: Supple. Full ROM. No lymphadenopathy.  Cardiovascular: Regular rate. Regular rhythm. No murmurs, rubs, or gallops. Distal pulses are 2+ and symmetric.  Pulmonary/Chest: No respiratory distress. Clear to auscultation bilaterally. No wheezing or rales.  Abdominal: Soft and non-distended.  There is no tenderness.  No rebound, guarding, or rigidity. Good bowel sounds.  Genitourinary: No CVA tenderness  Musculoskeletal: Moves all extremities. No obvious deformities. No edema. No calf tenderness.  Skin: Warm and dry. Ecchymosis to L wrist from recent procedure.  Neurological:  Alert, awake, and  "appropriate.  Normal speech.  No acute focal neurological deficits are appreciated.  Psychiatric: Normal affect. Good eye contact. Appropriate in content.    ED Course    Procedures  ED Vital Signs:  Vitals:    03/21/19 0212 03/21/19 0221 03/21/19 0231 03/21/19 0302   BP: (!) 142/88  133/78 (!) 120/57   Pulse: 99 95 93 91   Resp: 18  18 18   Temp: 98.2 °F (36.8 °C)      TempSrc: Oral      SpO2: 98%  98% 98%   Weight: 71.3 kg (157 lb 3 oz)      Height: 5' 4" (1.626 m)       03/21/19 0331   BP: 109/66   Pulse: 79   Resp: 18   Temp: 98 °F (36.7 °C)   TempSrc: Oral   SpO2: 97%   Weight:    Height:        Abnormal Lab Results:  Labs Reviewed   CBC W/ AUTO DIFFERENTIAL - Abnormal; Notable for the following components:       Result Value    WBC 15.75 (*)     MCH 32.7 (*)     Gran # (ANC) 12.1 (*)     Gran% 76.8 (*)     Lymph% 16.5 (*)     All other components within normal limits   COMPREHENSIVE METABOLIC PANEL - Abnormal; Notable for the following components:    Glucose 159 (*)     BUN, Bld 24 (*)     ALT 65 (*)     All other components within normal limits   TROPONIN I - Abnormal; Notable for the following components:    Troponin I 2.503 (*)     All other components within normal limits   PROTIME-INR   APTT   B-TYPE NATRIURETIC PEPTIDE        All Lab Results:  Results for orders placed or performed during the hospital encounter of 03/21/19   CBC auto differential   Result Value Ref Range    WBC 15.75 (H) 3.90 - 12.70 K/uL    RBC 4.89 4.60 - 6.20 M/uL    Hemoglobin 16.0 14.0 - 18.0 g/dL    Hematocrit 46.3 40.0 - 54.0 %    MCV 95 82 - 98 fL    MCH 32.7 (H) 27.0 - 31.0 pg    MCHC 34.6 32.0 - 36.0 g/dL    RDW 12.2 11.5 - 14.5 %    Platelets 319 150 - 350 K/uL    MPV 9.3 9.2 - 12.9 fL    Gran # (ANC) 12.1 (H) 1.8 - 7.7 K/uL    Lymph # 2.6 1.0 - 4.8 K/uL    Mono # 0.9 0.3 - 1.0 K/uL    Eos # 0.1 0.0 - 0.5 K/uL    Baso # 0.02 0.00 - 0.20 K/uL    Gran% 76.8 (H) 38.0 - 73.0 %    Lymph% 16.5 (L) 18.0 - 48.0 %    Mono% 5.8 4.0 - " 15.0 %    Eosinophil% 0.8 0.0 - 8.0 %    Basophil% 0.1 0.0 - 1.9 %    Differential Method Automated    Comprehensive metabolic panel   Result Value Ref Range    Sodium 136 136 - 145 mmol/L    Potassium 4.0 3.5 - 5.1 mmol/L    Chloride 100 95 - 110 mmol/L    CO2 24 23 - 29 mmol/L    Glucose 159 (H) 70 - 110 mg/dL    BUN, Bld 24 (H) 6 - 20 mg/dL    Creatinine 1.3 0.5 - 1.4 mg/dL    Calcium 10.1 8.7 - 10.5 mg/dL    Total Protein 7.7 6.0 - 8.4 g/dL    Albumin 4.2 3.5 - 5.2 g/dL    Total Bilirubin 1.0 0.1 - 1.0 mg/dL    Alkaline Phosphatase 86 55 - 135 U/L    AST 18 10 - 40 U/L    ALT 65 (H) 10 - 44 U/L    Anion Gap 12 8 - 16 mmol/L    eGFR if African American >60 >60 mL/min/1.73 m^2    eGFR if non African American >60 >60 mL/min/1.73 m^2   Protime-INR   Result Value Ref Range    Prothrombin Time 10.3 9.0 - 12.5 sec    INR 0.9 0.8 - 1.2   APTT   Result Value Ref Range    aPTT 29.5 21.0 - 32.0 sec   Brain natriuretic peptide   Result Value Ref Range    BNP 22 0 - 99 pg/mL   Troponin I   Result Value Ref Range    Troponin I 2.503 (H) 0.000 - 0.026 ng/mL         Imaging Results:  Imaging Results          X-Ray Chest AP Portable (In process)                Per ED physician, pt's CXR results shows NAF.      The EKG was ordered, reviewed, and independently interpreted by the ED provider.  Interpretation time: 0210  Rate: 90 BPM  Rhythm: normal sinus rhythm  Interpretation: No acute ST changes. No STEMI.               The Emergency Provider reviewed the vital signs and test results, which are outlined above.    ED Discussion     3:46 AM: Reassessed pt at this time.  Pt states his condition has improved at this time. Discussed with pt all pertinent ED information and results. Discussed pt dx and plan of tx. Gave pt all f/u and return to the ED instructions. All questions and concerns were addressed at this time. Pt expresses understanding of information and instructions, and is comfortable with plan to discharge. Pt is stable  for discharge.      I discussed with patient and/or family/caretaker that evaluation in the ED does not suggest any emergent or life threatening medical conditions requiring immediate intervention beyond what was provided in the ED, and I believe patient is safe for discharge.  Regardless, an unremarkable evaluation in the ED does not preclude the development or presence of a serious of life threatening condition. As such, patient was instructed to return immediately for any worsening or change in current symptoms.    Discharge Medication List as of 3/21/2019  3:45 AM      CONTINUE these medications which have NOT CHANGED    Details   aspirin 81 MG Chew Take 1 tablet (81 mg total) by mouth once daily., Starting Wed 3/20/2019, Until Thu 3/19/2020, OTC      atorvastatin (LIPITOR) 40 MG tablet Take 1 tablet (40 mg total) by mouth once daily., Starting Wed 3/20/2019, Until Thu 3/19/2020, Normal      carvedilol (COREG) 6.25 MG tablet Take 1 tablet (6.25 mg total) by mouth 2 (two) times daily., Starting Tue 3/19/2019, Until Wed 3/18/2020, Normal      lisinopril 10 MG tablet Take 1 tablet (10 mg total) by mouth once daily., Starting Wed 3/20/2019, Until Thu 3/19/2020, Normal      metFORMIN (GLUCOPHAGE) 500 MG tablet Take 2 tablets (1,000 mg total) by mouth 2 (two) times daily with meals., Starting Tue 3/19/2019, Until Thu 4/18/2019, Normal      ranolazine (RANEXA) 500 MG Tb12 Take 1 tablet (500 mg total) by mouth 2 (two) times daily., Starting Tue 3/19/2019, Until Wed 3/18/2020, Normal      ticagrelor (BRILINTA) 90 mg tablet Take 1 tablet (90 mg total) by mouth 2 (two) times daily., Starting Tue 3/19/2019, Until Wed 3/18/2020, Normal               ED Medication(s):  Medications - No data to display    Follow-up Information     [facility] In 1 day.    Specialty:  Cardiology  Contact information:  [address]  [facility] [address] [phone]           [facility]     Specialty:  Emergency Medicine  Why:  As needed, If symptoms worsen  Contact information:  [address] [facility] [address] [phone]                   Medical Decision Making    Medical Decision Making:   Clinical Tests:   Lab Tests: Ordered and Reviewed  Radiological Study: Ordered and Reviewed  Medical Tests: Ordered and Reviewed           Scribe Attestation:   Scribe #1: I performed the above scribed service and the documentation accurately describes the services I performed. I attest to the accuracy of the note.    Attending:   Physician Attestation Statement for Scribe #1: I, Alfredo Gallegos MD, personally performed the services described in this documentation, as scribed by Marlen Matthews, in my presence, and it is both accurate and complete.          Clinical Impression       ICD-10-CM ICD-9-CM   1. Shortness of breath R06.02 786.05   2. SOB (shortness of breath) R06.02 786.05       Disposition:   Disposition: Discharged  Condition: Stable         Alfredo Gallegos MD  03/21/19 1655

## 2019-03-25 ENCOUNTER — OFFICE VISIT (OUTPATIENT)
Dept: CARDIOLOGY | Facility: CLINIC | Age: 34
End: 2019-03-25
Payer: COMMERCIAL

## 2019-03-25 VITALS
OXYGEN SATURATION: 98 % | SYSTOLIC BLOOD PRESSURE: 152 MMHG | DIASTOLIC BLOOD PRESSURE: 96 MMHG | HEIGHT: 64 IN | HEART RATE: 90 BPM | BODY MASS INDEX: 26.8 KG/M2 | WEIGHT: 157 LBS

## 2019-03-25 DIAGNOSIS — I25.118 ATHEROSCLEROSIS OF NATIVE CORONARY ARTERY OF NATIVE HEART WITH STABLE ANGINA PECTORIS: ICD-10-CM

## 2019-03-25 DIAGNOSIS — I10 ESSENTIAL HYPERTENSION: ICD-10-CM

## 2019-03-25 DIAGNOSIS — I21.4 NSTEMI (NON-ST ELEVATED MYOCARDIAL INFARCTION): ICD-10-CM

## 2019-03-25 DIAGNOSIS — E11.9 DIABETES MELLITUS WITHOUT COMPLICATION: ICD-10-CM

## 2019-03-25 DIAGNOSIS — Z95.5 S/P CORONARY ARTERY STENT PLACEMENT: Primary | ICD-10-CM

## 2019-03-25 PROCEDURE — 93000 EKG 12-LEAD: ICD-10-PCS | Mod: S$GLB,,, | Performed by: INTERNAL MEDICINE

## 2019-03-25 PROCEDURE — 99214 OFFICE O/P EST MOD 30 MIN: CPT | Mod: S$GLB,,, | Performed by: PHYSICIAN ASSISTANT

## 2019-03-25 PROCEDURE — 99999 PR PBB SHADOW E&M-EST. PATIENT-LVL III: ICD-10-PCS | Mod: PBBFAC,,, | Performed by: PHYSICIAN ASSISTANT

## 2019-03-25 PROCEDURE — 93000 ELECTROCARDIOGRAM COMPLETE: CPT | Mod: S$GLB,,, | Performed by: INTERNAL MEDICINE

## 2019-03-25 PROCEDURE — 99214 PR OFFICE/OUTPT VISIT, EST, LEVL IV, 30-39 MIN: ICD-10-PCS | Mod: S$GLB,,, | Performed by: PHYSICIAN ASSISTANT

## 2019-03-25 PROCEDURE — 99999 PR PBB SHADOW E&M-EST. PATIENT-LVL III: CPT | Mod: PBBFAC,,, | Performed by: PHYSICIAN ASSISTANT

## 2019-03-25 NOTE — PROGRESS NOTES
"Subjective:    Patient ID:  Peewee Soto Jr. is a 33 y.o. male who presents for follow-up of hospital follow-up      HPI   Mr. Soto is a 33 year old male patient whose current medical conditions include uncontrolled DM who presents today for hospital follow-up. He was recently admitted to [facility] with chest pain/NSTEMI. He subsequently underwent LHC with subsequent successful intervention of LCX and OM1. His medications were optimized and he was later discharged. He returns today and states he is doing ok. Feels anxious at times and overwhelmed. He initially had some intermittent SOB after discharge. This is improving, has almost resolved. Denies any esther chest pain, tightness, or heaviness, but does endorse some "soreness to touch" at times. No lightheadedness, dizziness, palpitations, near syncope, or syncope. No s/s of CHF. No radial access site complaints. Patient reports compliance with his medications, is taking ASA and Brilinta. EKG today shows NSR, normal EKG.    Review of Systems   Constitution: Negative for chills, decreased appetite, fever and malaise/fatigue.   HENT: Negative for congestion, hoarse voice and sore throat.    Eyes: Negative for blurred vision and discharge.   Cardiovascular: Negative for chest pain, claudication, cyanosis, dyspnea on exertion, irregular heartbeat, leg swelling, near-syncope, orthopnea, palpitations and paroxysmal nocturnal dyspnea.   Respiratory: Negative for cough, hemoptysis, shortness of breath, snoring, sputum production and wheezing.    Endocrine: Negative for cold intolerance and heat intolerance.   Hematologic/Lymphatic: Negative for bleeding problem. Does not bruise/bleed easily.   Skin: Negative for rash.   Musculoskeletal: Negative for arthritis, back pain, joint pain, joint swelling, muscle cramps, muscle weakness and myalgias.   Gastrointestinal: Negative for abdominal pain, constipation, diarrhea, heartburn, melena and nausea.   Genitourinary: " "Negative for hematuria.   Neurological: Negative for dizziness, focal weakness, headaches, light-headedness, loss of balance, numbness, paresthesias, seizures and weakness.   Psychiatric/Behavioral: Negative for memory loss. The patient is nervous/anxious. The patient does not have insomnia.    Allergic/Immunologic: Negative for hives.     BP (!) 152/96   Pulse 90   Ht 5' 4" (1.626 m)   Wt 71.2 kg (157 lb)   SpO2 98%   BMI 26.95 kg/m²     Objective:    Physical Exam   Constitutional: He is oriented to person, place, and time. He appears well-developed and well-nourished. No distress.   HENT:   Head: Normocephalic and atraumatic.   Eyes: Pupils are equal, round, and reactive to light. Right eye exhibits no discharge. Left eye exhibits no discharge.   Neck: Neck supple. No JVD present.   Cardiovascular: Normal rate, regular rhythm, S1 normal, S2 normal and normal heart sounds.   No murmur heard.  Pulmonary/Chest: Effort normal and breath sounds normal. No respiratory distress. He has no wheezes. He has no rales.   Abdominal: Soft. He exhibits no distension.   Musculoskeletal: He exhibits no edema.   Neurological: He is alert and oriented to person, place, and time.   Skin: Skin is warm and dry. He is not diaphoretic. No erythema.   Radial access site C/D/I; no bleeding erythema or drainage   Psychiatric: He has a normal mood and affect. His behavior is normal. Thought content normal.   Nursing note and vitals reviewed.    . Hemodynamic Results     LVEDP (Pre): 24 mmHg  LVEDP (Post): 24 mmHg  Ejection Fraction: 55%  Global LV Function: normal    AIR REST (3/18/2019 14:50:46):  LV: 146  LVEDP: 24  LV: 145  LVEDP: 24     AOP: 140/91    E. Angiographic Results     Diagnostic:          Ramus artery was present.        - Left Main Coronary Artery:             The ostial LM is normal. There is CECY 3 flow.     - Left Anterior Descending Artery:             The LAD has luminal irregularities. There is CECY 3 flow.  the " ramus is normal .     - Left Circumflex Artery:             The LCX is normal. There is CECY 3 flow.             The mid LCX has a 90% stenosis. There is CECY 3 flow. the lesion extends into the proximal portion of om1 the lesion is eccentric hazzy irregular contour of 90% in magnitude. the p[roximal om1 is diffusely diseased.     - OM1:             The OM1 is diffusely diseased (75). There is CECY 3 flow.     - Right Coronary Artery:             The RCA has luminal irregularities. There is CECY 3 flow.      Intervention          Ostial LM:              The following items were used: Blln [place] 2.5 X 20 and Blln Quantum 2.5 X 08.       Mid LCX:              The lesion was successfully intervened. Post-stenosis of 0% and post-CECY 3 flow. The vessel was accessed natively.  The following items were used: Blln [place] 2.5 X 20, Stent [place] 2.25 X 22 (MICHELLE) and Blln Quantum 2.5 X 08.       OM1:              The lesion was successfully intervened. Post-stenosis of 0% and post-CECY 3 flow. The vessel was accessed natively.  The following items were used: Blln [place] 2.5 X 20, Stent [place] 2.25 X 22 (MICHELLE) and Blln Quantum 2.5 X 08.  the lesion is extending into the om1 from mid lcx. it s at a bifurcation point of om1/om2.the patient lesion was intervened upon in the mid lcx and om1 and the stent was deployed across the om2.it extyends from the lcx into om1 proximal.  F. Details of Procedure  Assessment:       1. S/P coronary artery stent placement    2. Atherosclerosis of native coronary artery of native heart with stable angina pectoris    3. NSTEMI (non-ST elevated myocardial infarction)    4. Diabetes mellitus without complication    5. Essential hypertension      Presents for f/u. Doing well. Anxious/overwhelmed which is to be expected. Discussed angiogram films, importance of DAPT and lifestyle modification/risk factor reduction. Increase Coreg to 12.5 mg BID for better BP control. Continue other meds.   Plan:   -Increase Coreg  to 12.5 mg BID  -Continue other meds  -Informed of importance of continuing uninterrupted DAPT-ASA, Brilinta x 1 year  -DM control, patient states he will schedule appt with PCP (out of [facility] system)  -RTC 2-3 weeks for nurse visit/BP check    Chart reviewed. Dr. Moreno agrees with plan as outlined above.

## 2019-03-29 PROBLEM — I10 ESSENTIAL HYPERTENSION: Status: ACTIVE | Noted: 2019-03-29

## 2019-04-01 ENCOUNTER — CLINICAL SUPPORT (OUTPATIENT)
Dept: CARDIOLOGY | Facility: CLINIC | Age: 34
End: 2019-04-01
Payer: COMMERCIAL

## 2019-04-01 PROCEDURE — 99999 PR PBB SHADOW E&M-EST. PATIENT-LVL I: ICD-10-PCS | Mod: PBBFAC,,,

## 2019-04-01 PROCEDURE — 99999 PR PBB SHADOW E&M-EST. PATIENT-LVL I: CPT | Mod: PBBFAC,,,

## 2019-04-01 RX ORDER — CARVEDILOL 6.25 MG/1
6.25 TABLET ORAL 2 TIMES DAILY
Qty: 60 TABLET | Refills: 11 | Status: SHIPPED | OUTPATIENT
Start: 2019-04-01 | End: 2019-05-23 | Stop reason: SDUPTHER

## 2019-04-01 RX ORDER — CARVEDILOL 6.25 MG/1
6.25 TABLET ORAL 2 TIMES DAILY
Qty: 60 TABLET | Refills: 11 | Status: CANCELLED | OUTPATIENT
Start: 2019-04-01 | End: 2020-03-31

## 2019-04-01 NOTE — TELEPHONE ENCOUNTER
Please call patient's pharmacy. I authorized Coreg 6.25 mg BID but it needs to be 12.5 mg BID.    Thanks

## 2019-04-01 NOTE — PROGRESS NOTES
Patient came into office for a Nurse visit on 4/1/2019 per Hadley Woo PA-C. Patient stated no chest pains no SOB. Patients BP are 128/84 in left arm 120/78 in right pulse is 88. Patient needs refill on carvedilol 6.25 due to Mrs. Butcher advising him to double up. Refill was sent over to prefer pharmacy. Patient also need a return to work for date 04/02/2019.     Called Mrs. Woo and provided her with all this information she advise for patient to stay on current dose of carvedilol 6.25 twice a day and he may return to work on 04/02/2019 and that he needs a 3 week follow up appt.    Patient voiced understand to all this information and appt has been made.

## 2019-04-15 ENCOUNTER — CLINICAL SUPPORT (OUTPATIENT)
Dept: CARDIOLOGY | Facility: CLINIC | Age: 34
End: 2019-04-15
Attending: PHYSICIAN ASSISTANT
Payer: COMMERCIAL

## 2019-04-15 ENCOUNTER — CLINICAL SUPPORT (OUTPATIENT)
Dept: CARDIOLOGY | Facility: CLINIC | Age: 34
End: 2019-04-15
Payer: COMMERCIAL

## 2019-04-15 ENCOUNTER — OFFICE VISIT (OUTPATIENT)
Dept: CARDIOLOGY | Facility: CLINIC | Age: 34
End: 2019-04-15
Payer: COMMERCIAL

## 2019-04-15 VITALS
BODY MASS INDEX: 26.64 KG/M2 | OXYGEN SATURATION: 98 % | DIASTOLIC BLOOD PRESSURE: 80 MMHG | WEIGHT: 156.06 LBS | SYSTOLIC BLOOD PRESSURE: 124 MMHG | HEIGHT: 64 IN | HEART RATE: 79 BPM

## 2019-04-15 DIAGNOSIS — R42 DIZZINESS: ICD-10-CM

## 2019-04-15 DIAGNOSIS — Z95.5 S/P CORONARY ARTERY STENT PLACEMENT: ICD-10-CM

## 2019-04-15 DIAGNOSIS — I10 ESSENTIAL HYPERTENSION: ICD-10-CM

## 2019-04-15 DIAGNOSIS — I25.2 HISTORY OF NON-ST ELEVATION MYOCARDIAL INFARCTION (NSTEMI): ICD-10-CM

## 2019-04-15 DIAGNOSIS — I25.118 ATHEROSCLEROSIS OF NATIVE CORONARY ARTERY OF NATIVE HEART WITH STABLE ANGINA PECTORIS: ICD-10-CM

## 2019-04-15 DIAGNOSIS — R42 DIZZINESS: Primary | ICD-10-CM

## 2019-04-15 DIAGNOSIS — E11.9 DIABETES MELLITUS WITHOUT COMPLICATION: ICD-10-CM

## 2019-04-15 PROCEDURE — 93000 EKG 12-LEAD: ICD-10-PCS | Mod: S$GLB,,, | Performed by: INTERNAL MEDICINE

## 2019-04-15 PROCEDURE — 99999 PR PBB SHADOW E&M-EST. PATIENT-LVL IV: CPT | Mod: PBBFAC,,, | Performed by: PHYSICIAN ASSISTANT

## 2019-04-15 PROCEDURE — 93880 CAR US DOPPLER CAROTID BILATERAL: ICD-10-PCS | Mod: S$GLB,,, | Performed by: INTERNAL MEDICINE

## 2019-04-15 PROCEDURE — 99999 PR PBB SHADOW E&M-EST. PATIENT-LVL IV: ICD-10-PCS | Mod: PBBFAC,,, | Performed by: PHYSICIAN ASSISTANT

## 2019-04-15 PROCEDURE — 93880 EXTRACRANIAL BILAT STUDY: CPT | Mod: S$GLB,,, | Performed by: INTERNAL MEDICINE

## 2019-04-15 PROCEDURE — 99214 PR OFFICE/OUTPT VISIT, EST, LEVL IV, 30-39 MIN: ICD-10-PCS | Mod: S$GLB,,, | Performed by: PHYSICIAN ASSISTANT

## 2019-04-15 PROCEDURE — 93000 ELECTROCARDIOGRAM COMPLETE: CPT | Mod: S$GLB,,, | Performed by: INTERNAL MEDICINE

## 2019-04-15 PROCEDURE — 99214 OFFICE O/P EST MOD 30 MIN: CPT | Mod: S$GLB,,, | Performed by: PHYSICIAN ASSISTANT

## 2019-04-15 NOTE — PROGRESS NOTES
Subjective:    Patient ID:  Peewee Soto Jr. is a 34 y.o. male who presents for follow-up of CAD, HTN      HPI  Mr. Soto is a 33 year old male patient whose current medical conditions include uncontrolled DM, HTN, and CAD s/p recent NSTEMI with PCI of LCX and OM1. Patient was previously seen by me and had Coreg increased due to elevated BP. He returns today and states he is doing ok. Complains of episodic dizziness. Seems to occur mainly after medications or when he moves too quickly. No associated symptoms. Denies any palpitations, lightheadedness, near syncope, or syncope. Still also admits to intermittent SOB. Comes and goes, improves throughout the day. No associated chest pain, tightness or heaviness. No s/s of CHF. Patient reports compliance with his medications, currently on ASA and Brilinta. BP stable on current meds. EKG today shows NSR, LVH, no acute ischemic changes.     Review of Systems   Constitution: Negative for chills, decreased appetite, fever and malaise/fatigue.   HENT: Negative for congestion, hoarse voice and sore throat.    Eyes: Negative for blurred vision and discharge.   Cardiovascular: Negative for chest pain, claudication, cyanosis, dyspnea on exertion, irregular heartbeat, leg swelling, near-syncope, orthopnea, palpitations and paroxysmal nocturnal dyspnea.   Respiratory: Positive for shortness of breath (intermittent). Negative for cough, hemoptysis, snoring, sputum production and wheezing.    Endocrine: Negative for cold intolerance and heat intolerance.   Hematologic/Lymphatic: Negative for bleeding problem. Does not bruise/bleed easily.   Skin: Negative for rash.   Musculoskeletal: Negative for arthritis, back pain, joint pain, joint swelling, muscle cramps, muscle weakness and myalgias.   Gastrointestinal: Negative for abdominal pain, constipation, diarrhea, heartburn, melena and nausea.   Genitourinary: Negative for hematuria.   Neurological: Positive for dizziness.  "Negative for focal weakness, headaches, light-headedness, loss of balance, numbness, paresthesias, seizures and weakness.   Psychiatric/Behavioral: Negative for memory loss. The patient does not have insomnia.    Allergic/Immunologic: Negative for hives.     /80   Pulse 79   Ht 5' 4" (1.626 m)   Wt 70.8 kg (156 lb 1.4 oz)   SpO2 98%   BMI 26.79 kg/m²   Orthostatic BP: sittin/78; lying 113/69; standing 141/90  Objective:    Physical Exam   Constitutional: He is oriented to person, place, and time. He appears well-developed and well-nourished. No distress.   HENT:   Head: Normocephalic and atraumatic.   Eyes: Pupils are equal, round, and reactive to light. Right eye exhibits no discharge. Left eye exhibits no discharge.   Neck: Neck supple. No JVD present.   Cardiovascular: Normal rate, regular rhythm, S1 normal, S2 normal and normal heart sounds.   No murmur heard.  Pulmonary/Chest: Effort normal and breath sounds normal. No respiratory distress. He has no wheezes. He has no rales.   Abdominal: Soft. He exhibits no distension.   Musculoskeletal: He exhibits no edema.   Neurological: He is alert and oriented to person, place, and time.   Skin: Skin is warm and dry. He is not diaphoretic. No erythema.   Psychiatric: He has a normal mood and affect. His behavior is normal. Thought content normal.   Nursing note and vitals reviewed.      Chemistry        Component Value Date/Time     2019 0236    K 4.0 2019 0236     2019 0236    CO2 24 2019 0236    BUN 24 (H) 2019 0236    CREATININE 1.3 2019 0236     (H) 2019 0236        Component Value Date/Time    CALCIUM 10.1 2019 0236    ALKPHOS 86 2019 0236    AST 18 2019 0236    ALT 65 (H) 2019 0236    BILITOT 1.0 2019 0236    ESTGFRAFRICA >60 2019 0236    EGFRNONAA >60 2019 0236        Lab Results   Component Value Date    CHOL 196 2019     Lab Results "   Component Value Date    HDL 42 03/18/2019     Lab Results   Component Value Date    LDLCALC 101.8 03/18/2019     Lab Results   Component Value Date    TRIG 261 (H) 03/18/2019     Lab Results   Component Value Date    CHOLHDL 21.4 03/18/2019     E. Angiographic Results     Diagnostic:          Ramus artery was present.        - Left Main Coronary Artery:             The ostial LM is normal. There is CECY 3 flow.     - Left Anterior Descending Artery:             The LAD has luminal irregularities. There is CECY 3 flow.  the ramus is normal .     - Left Circumflex Artery:             The LCX is normal. There is CECY 3 flow.             The mid LCX has a 90% stenosis. There is CECY 3 flow. the lesion extends into the proximal portion of om1 the lesion is eccentric hazzy irregular contour of 90% in magnitude. the p[roximal om1 is diffusely diseased.     - OM1:             The OM1 is diffusely diseased (75). There is CECY 3 flow.     - Right Coronary Artery:             The RCA has luminal irregularities. There is CECY 3 flow.      Intervention          Ostial LM:              The following items were used: Blln Cyclone 2.5 X 20 and Blln Quantum 2.5 X 08.       Mid LCX:              The lesion was successfully intervened. Post-stenosis of 0% and post-CECY 3 flow. The vessel was accessed natively.  The following items were used: Blln Cyclone 2.5 X 20, Stent Longs 2.25 X 22 (MICHELLE) and Blln Quantum 2.5 X 08.       OM1:              The lesion was successfully intervened. Post-stenosis of 0% and post-CECY 3 flow. The vessel was accessed natively.  The following items were used: Blln Cyclone 2.5 X 20, Stent Edgar 2.25 X 22 (MICHELLE) and Blln Quantum 2.5 X 08.  the lesion is extending into the om1 from mid lcx. it s at a bifurcation point of om1/om2.the patient lesion was intervened upon in the mid lcx and om1 and the stent was deployed across the om2.it extyends from the lcx into om1 proximal.  F. Details of Procedure      2D Echo  CONCLUSIONS     1 - Concentric hypertrophy.     2 - No wall motion abnormalities.     3 - Normal left ventricular systolic function (EF 55-60%).     4 - Normal left ventricular diastolic function.     5 - Normal right ventricular systolic function .   Assessment:       1. Dizziness    2. Atherosclerosis of native coronary artery of native heart with stable angina pectoris    3. Essential hypertension    4. History of non-ST elevation myocardial infarction (NSTEMI)    5. S/P coronary artery stent placement    6. Diabetes mellitus without complication      Patient presents for f/u. Doing ok. Complains of episodic dizziness. May be related to new meds or sugar fluctuation or component of orthostasis. Advised to eat breakfast with AM meds. Needs PCP appt for metformin refill/DM mgmt. Check carotid U/S. Will discuss switching Brilinta to Effient vs Plavix after he finishes this prescription given intermittent SOB.   Plan:   -Carotid U/S, phone review  -Continue same meds for now, will discuss further with Dr. Moreno about switching to Effient vs Plavix in place of Brilinta  -Change positions slowly  -PCP referral for DM mgmt  -RTC 4 weeks with lipid, cmp, A1C    Chart reviewed. Dr. Moreno agrees with plan as outlined above.

## 2019-04-16 LAB — INTERNAL CAROTID STENOSIS: NORMAL

## 2019-04-17 ENCOUNTER — TELEPHONE (OUTPATIENT)
Dept: CARDIOLOGY | Facility: CLINIC | Age: 34
End: 2019-04-17

## 2019-04-17 DIAGNOSIS — Z95.5 S/P CORONARY ARTERY STENT PLACEMENT: Primary | ICD-10-CM

## 2019-04-17 RX ORDER — PRASUGREL 10 MG/1
10 TABLET, FILM COATED ORAL DAILY
Qty: 30 TABLET | Refills: 11 | Status: SHIPPED | OUTPATIENT
Start: 2019-04-17 | End: 2020-03-02 | Stop reason: SDUPTHER

## 2019-04-17 NOTE — TELEPHONE ENCOUNTER
Please phone patient. Discussed with Dr. Moreno. After he completes this month's prescription of Brilinta, he is to start taking Prasurgel 10 mg daily.    Prescription sent to pharmacy. Again he is to start taking this after he FINISHES this month's prescription of Brilinta.    Let him know carotid U/S showed 20-39% blockage bilaterally. This is stable. Continue same meds    Thanks

## 2019-04-22 ENCOUNTER — TELEPHONE (OUTPATIENT)
Dept: CARDIOLOGY | Facility: HOSPITAL | Age: 34
End: 2019-04-22

## 2019-04-22 NOTE — TELEPHONE ENCOUNTER
Spoke with pt notified pt the information. Pt voiced understanding.      Pt states no he doesn't need refill states he will go  refill on file at pharmacy.

## 2019-04-22 NOTE — TELEPHONE ENCOUNTER
Pt states he has picked up medication Effient. Pt wants to know should he continue taking medication Ranexa states he hasn't taking medication in a few days now. Please advise.

## 2019-05-23 ENCOUNTER — LAB VISIT (OUTPATIENT)
Dept: LAB | Facility: HOSPITAL | Age: 34
End: 2019-05-23
Attending: PHYSICIAN ASSISTANT
Payer: COMMERCIAL

## 2019-05-23 DIAGNOSIS — E11.9 DIABETES MELLITUS WITHOUT COMPLICATION: ICD-10-CM

## 2019-05-23 DIAGNOSIS — I25.118 CORONARY ARTERY DISEASE OF NATIVE ARTERY OF NATIVE HEART WITH STABLE ANGINA PECTORIS: Primary | ICD-10-CM

## 2019-05-23 DIAGNOSIS — I10 ESSENTIAL HYPERTENSION: ICD-10-CM

## 2019-05-23 DIAGNOSIS — Z95.5 S/P CORONARY ARTERY STENT PLACEMENT: ICD-10-CM

## 2019-05-23 LAB
ALBUMIN SERPL BCP-MCNC: 4 G/DL (ref 3.5–5.2)
ALP SERPL-CCNC: 87 U/L (ref 55–135)
ALT SERPL W/O P-5'-P-CCNC: 55 U/L (ref 10–44)
ANION GAP SERPL CALC-SCNC: 8 MMOL/L (ref 8–16)
AST SERPL-CCNC: 16 U/L (ref 10–40)
BILIRUB SERPL-MCNC: 0.5 MG/DL (ref 0.1–1)
BUN SERPL-MCNC: 17 MG/DL (ref 6–20)
CALCIUM SERPL-MCNC: 10 MG/DL (ref 8.7–10.5)
CHLORIDE SERPL-SCNC: 105 MMOL/L (ref 95–110)
CHOLEST SERPL-MCNC: 141 MG/DL (ref 120–199)
CHOLEST/HDLC SERPL: 2.8 {RATIO} (ref 2–5)
CO2 SERPL-SCNC: 23 MMOL/L (ref 23–29)
CREAT SERPL-MCNC: 1.1 MG/DL (ref 0.5–1.4)
EST. GFR  (AFRICAN AMERICAN): >60 ML/MIN/1.73 M^2
EST. GFR  (NON AFRICAN AMERICAN): >60 ML/MIN/1.73 M^2
ESTIMATED AVG GLUCOSE: 171 MG/DL (ref 68–131)
GLUCOSE SERPL-MCNC: 169 MG/DL (ref 70–110)
HBA1C MFR BLD HPLC: 7.6 % (ref 4–5.6)
HDLC SERPL-MCNC: 51 MG/DL (ref 40–75)
HDLC SERPL: 36.2 % (ref 20–50)
LDLC SERPL CALC-MCNC: 73.2 MG/DL (ref 63–159)
NONHDLC SERPL-MCNC: 90 MG/DL
POTASSIUM SERPL-SCNC: 5.5 MMOL/L (ref 3.5–5.1)
PROT SERPL-MCNC: 7.4 G/DL (ref 6–8.4)
SODIUM SERPL-SCNC: 136 MMOL/L (ref 136–145)
TRIGL SERPL-MCNC: 84 MG/DL (ref 30–150)

## 2019-05-23 PROCEDURE — 83036 HEMOGLOBIN GLYCOSYLATED A1C: CPT

## 2019-05-23 PROCEDURE — 80053 COMPREHEN METABOLIC PANEL: CPT

## 2019-05-23 PROCEDURE — 36415 COLL VENOUS BLD VENIPUNCTURE: CPT

## 2019-05-23 PROCEDURE — 80061 LIPID PANEL: CPT

## 2019-05-23 RX ORDER — CARVEDILOL 6.25 MG/1
6.25 TABLET ORAL 2 TIMES DAILY
Qty: 60 TABLET | Refills: 11 | Status: SHIPPED | OUTPATIENT
Start: 2019-05-23 | End: 2019-05-30

## 2019-05-24 ENCOUNTER — TELEPHONE (OUTPATIENT)
Dept: CARDIOLOGY | Facility: CLINIC | Age: 34
End: 2019-05-24

## 2019-05-24 ENCOUNTER — LAB VISIT (OUTPATIENT)
Dept: LAB | Facility: HOSPITAL | Age: 34
End: 2019-05-24
Attending: PHYSICIAN ASSISTANT
Payer: COMMERCIAL

## 2019-05-24 DIAGNOSIS — E87.5 HYPERKALEMIA: ICD-10-CM

## 2019-05-24 DIAGNOSIS — E11.9 DIABETES MELLITUS WITHOUT COMPLICATION: Primary | ICD-10-CM

## 2019-05-24 LAB
ANION GAP SERPL CALC-SCNC: 9 MMOL/L (ref 8–16)
BUN SERPL-MCNC: 16 MG/DL (ref 6–20)
CALCIUM SERPL-MCNC: 10.1 MG/DL (ref 8.7–10.5)
CHLORIDE SERPL-SCNC: 102 MMOL/L (ref 95–110)
CO2 SERPL-SCNC: 27 MMOL/L (ref 23–29)
CREAT SERPL-MCNC: 1.3 MG/DL (ref 0.5–1.4)
EST. GFR  (AFRICAN AMERICAN): >60 ML/MIN/1.73 M^2
EST. GFR  (NON AFRICAN AMERICAN): >60 ML/MIN/1.73 M^2
GLUCOSE SERPL-MCNC: 139 MG/DL (ref 70–110)
POTASSIUM SERPL-SCNC: 4.4 MMOL/L (ref 3.5–5.1)
SODIUM SERPL-SCNC: 138 MMOL/L (ref 136–145)

## 2019-05-24 PROCEDURE — 80048 BASIC METABOLIC PNL TOTAL CA: CPT

## 2019-05-24 PROCEDURE — 36415 COLL VENOUS BLD VENIPUNCTURE: CPT

## 2019-05-24 NOTE — TELEPHONE ENCOUNTER
Please phone patient. Labs reviewed. Lipids are at goal. CMP showed K is elevated. He needs repeat lab today, can be at [facility] if needed.    A1C improving but still elevated. Needs to see endocrinology. Referral entered, please arrange    Thanks

## 2019-05-24 NOTE — TELEPHONE ENCOUNTER
The patient has been notified of this information and all questions answered.      Pt is scheduled for repeat labs today. I offered pt an appt today to see Endo states he isn't able to come today. I scheduled pt to see Endo on 6/17/19 with Dr. Arboleda at [facility] soonest available slot. Pt states to schedule will keep that appt.

## 2019-05-30 ENCOUNTER — OFFICE VISIT (OUTPATIENT)
Dept: CARDIOLOGY | Facility: CLINIC | Age: 34
End: 2019-05-30
Payer: COMMERCIAL

## 2019-05-30 VITALS
DIASTOLIC BLOOD PRESSURE: 84 MMHG | HEIGHT: 64 IN | BODY MASS INDEX: 26.31 KG/M2 | HEART RATE: 90 BPM | WEIGHT: 154.13 LBS | SYSTOLIC BLOOD PRESSURE: 142 MMHG

## 2019-05-30 DIAGNOSIS — E11.9 DIABETES MELLITUS WITHOUT COMPLICATION: ICD-10-CM

## 2019-05-30 DIAGNOSIS — I10 ESSENTIAL HYPERTENSION: ICD-10-CM

## 2019-05-30 DIAGNOSIS — Z95.5 S/P CORONARY ARTERY STENT PLACEMENT: ICD-10-CM

## 2019-05-30 DIAGNOSIS — I25.118 CORONARY ARTERY DISEASE OF NATIVE ARTERY OF NATIVE HEART WITH STABLE ANGINA PECTORIS: Primary | ICD-10-CM

## 2019-05-30 DIAGNOSIS — I25.2 HISTORY OF NON-ST ELEVATION MYOCARDIAL INFARCTION (NSTEMI): ICD-10-CM

## 2019-05-30 PROCEDURE — 99999 PR PBB SHADOW E&M-EST. PATIENT-LVL III: ICD-10-PCS | Mod: PBBFAC,,, | Performed by: INTERNAL MEDICINE

## 2019-05-30 PROCEDURE — 99214 PR OFFICE/OUTPT VISIT, EST, LEVL IV, 30-39 MIN: ICD-10-PCS | Mod: S$GLB,,, | Performed by: INTERNAL MEDICINE

## 2019-05-30 PROCEDURE — 99999 PR PBB SHADOW E&M-EST. PATIENT-LVL III: CPT | Mod: PBBFAC,,, | Performed by: INTERNAL MEDICINE

## 2019-05-30 PROCEDURE — 99214 OFFICE O/P EST MOD 30 MIN: CPT | Mod: S$GLB,,, | Performed by: INTERNAL MEDICINE

## 2019-05-30 RX ORDER — CARVEDILOL 12.5 MG/1
12.5 TABLET ORAL 2 TIMES DAILY WITH MEALS
COMMUNITY
End: 2019-06-05

## 2019-05-30 NOTE — PROGRESS NOTES
Subjective:   Patient ID:  Peewee Soto Jr. is a 34 y.o. male who presents for follow up of Carotid Artery Disease (c/o intermittent chest soreness)      HPI  A 33 yo male with nstemi lcx stent is here for f/u he ran out of metformin not compliant with diet he has not had coreg for a long time also. He is taking effient as a statins. He is not compliant  WITH DIET . He has heart burn but has no other isses clinically cardiac wise. He gained few lbs. Has no angina no exercise . He walks a lot at work.  Past Medical History:   Diagnosis Date    Coronary artery disease involving native coronary artery of native heart     Diabetes mellitus     Essential hypertension 3/29/2019    GERD (gastroesophageal reflux disease)     NSTEMI (non-ST elevated myocardial infarction)     S/P coronary artery stent placement     Undescended testicle before puberty        Past Surgical History:   Procedure Laterality Date    APPENDECTOMY N/A 11/11/2015    Performed [facility]valdo Guy [facility] at [facility]    CATHETERIZATION, HEART, LEFT Left 3/18/2019    Performed by Bill Moreno MD at [facility] CATH LAB    CORONARY ANGIOPLASTY      CORONARY STENT PLACEMENT      HERNIA REPAIR         Social History     Tobacco Use    Smoking status: Never Smoker    Smokeless tobacco: Never Used   Substance Use Topics    Alcohol use: No    Drug use: No       Family History   Problem Relation Age of Onset    Diabetes Mother     Hypertension Father     Heart disease Maternal Grandmother        Current Outpatient Medications   Medication Sig    aspirin 81 MG Chew Take 1 tablet (81 mg total) by mouth once daily.    atorvastatin (LIPITOR) 40 MG tablet Take 1 tablet (40 mg total) by mouth once daily.    carvedilol (COREG) 12.5 MG tablet Take 12.5 mg by mouth 2 (two) times daily with meals.    lisinopril 10 MG tablet Take 1 tablet (10 mg total) by mouth once daily.    prasugrel (EFFIENT) 10 mg Tab Take 1 tablet (10 mg total) by mouth once  daily.    ranolazine (RANEXA) 500 MG Tb12 Take 1 tablet (500 mg total) by mouth 2 (two) times daily.    metFORMIN (GLUCOPHAGE) 500 MG tablet Take 2 tablets (1,000 mg total) by mouth 2 (two) times daily with meals.     No current facility-administered medications for this visit.      Current Outpatient Medications on File Prior to Visit   Medication Sig    aspirin 81 MG Chew Take 1 tablet (81 mg total) by mouth once daily.    atorvastatin (LIPITOR) 40 MG tablet Take 1 tablet (40 mg total) by mouth once daily.    carvedilol (COREG) 12.5 MG tablet Take 12.5 mg by mouth 2 (two) times daily with meals.    lisinopril 10 MG tablet Take 1 tablet (10 mg total) by mouth once daily.    prasugrel (EFFIENT) 10 mg Tab Take 1 tablet (10 mg total) by mouth once daily.    ranolazine (RANEXA) 500 MG Tb12 Take 1 tablet (500 mg total) by mouth 2 (two) times daily.    metFORMIN (GLUCOPHAGE) 500 MG tablet Take 2 tablets (1,000 mg total) by mouth 2 (two) times daily with meals.    [DISCONTINUED] carvedilol (COREG) 6.25 MG tablet Take 1 tablet (6.25 mg total) by mouth 2 (two) times daily.     No current facility-administered medications on file prior to visit.      Review of patient's allergies indicates:   Allergen Reactions    Pcn [penicillins] Rash     Review of Systems   Constitution: Negative for malaise/fatigue.   Eyes: Negative for blurred vision.   Cardiovascular: Negative for chest pain, claudication, cyanosis, dyspnea on exertion, irregular heartbeat, leg swelling, near-syncope, orthopnea, palpitations and paroxysmal nocturnal dyspnea.   Respiratory: Negative for cough, hemoptysis and shortness of breath.    Hematologic/Lymphatic: Negative for bleeding problem. Does not bruise/bleed easily.   Skin: Negative for dry skin and itching.   Musculoskeletal: Negative for falls, muscle weakness and myalgias.   Gastrointestinal: Negative for abdominal pain, diarrhea, heartburn, hematemesis, hematochezia and melena.  "  Genitourinary: Negative for flank pain and hematuria.   Neurological: Negative for dizziness, focal weakness, headaches, light-headedness, numbness, paresthesias, seizures and weakness.   Psychiatric/Behavioral: Negative for altered mental status and memory loss. The patient is not nervous/anxious.    Allergic/Immunologic: Negative for hives.       Objective:   Physical Exam   Constitutional: He is oriented to person, place, and time. He appears well-developed and well-nourished. No distress.   HENT:   Head: Normocephalic and atraumatic.   Eyes: Pupils are equal, round, and reactive to light. EOM are normal. Right eye exhibits no discharge. Left eye exhibits no discharge.   Neck: Neck supple. No JVD present. No thyromegaly present.   Cardiovascular: Normal rate, regular rhythm, normal heart sounds and intact distal pulses. Exam reveals no gallop and no friction rub.   No murmur heard.  Pulmonary/Chest: Effort normal and breath sounds normal. No respiratory distress. He has no wheezes. He has no rales. He exhibits no tenderness.   Abdominal: Soft. Bowel sounds are normal. He exhibits no distension. There is no tenderness.   Musculoskeletal: Normal range of motion. He exhibits no edema.   Neurological: He is alert and oriented to person, place, and time. No cranial nerve deficit.   Skin: Skin is warm and dry. No rash noted. He is not diaphoretic. No erythema.   Psychiatric: He has a normal mood and affect. His behavior is normal.   Nursing note and vitals reviewed.    Vitals:    05/30/19 1557   BP: (!) 142/84   Patient Position: Sitting   BP Method: Small (Manual)   Pulse: 90   Weight: 69.9 kg (154 lb 1.6 oz)   Height: 5' 4" (1.626 m)     Lab Results   Component Value Date    CHOL 141 05/23/2019    CHOL 196 03/18/2019     Lab Results   Component Value Date    HDL 51 05/23/2019    HDL 42 03/18/2019     Lab Results   Component Value Date    LDLCALC 73.2 05/23/2019    LDLCALC 101.8 03/18/2019     Lab Results "   Component Value Date    TRIG 84 05/23/2019    TRIG 261 (H) 03/18/2019     Lab Results   Component Value Date    CHOLHDL 36.2 05/23/2019    CHOLHDL 21.4 03/18/2019       Chemistry        Component Value Date/Time     05/24/2019 1413    K 4.4 05/24/2019 1413     05/24/2019 1413    CO2 27 05/24/2019 1413    BUN 16 05/24/2019 1413    CREATININE 1.3 05/24/2019 1413     (H) 05/24/2019 1413        Component Value Date/Time    CALCIUM 10.1 05/24/2019 1413    ALKPHOS 87 05/23/2019 0805    AST 16 05/23/2019 0805    ALT 55 (H) 05/23/2019 0805    BILITOT 0.5 05/23/2019 0805    ESTGFRAFRICA >60 05/24/2019 1413    EGFRNONAA >60 05/24/2019 1413        Lab Results   Component Value Date    HGBA1C 7.6 (H) 05/23/2019       Lab Results   Component Value Date    TSH 1.109 10/21/2016     Lab Results   Component Value Date    INR 0.9 03/21/2019    INR 0.9 03/18/2019    INR 1.0 11/11/2015     Lab Results   Component Value Date    WBC 15.75 (H) 03/21/2019    HGB 16.0 03/21/2019    HCT 46.3 03/21/2019    MCV 95 03/21/2019     03/21/2019     BMP  Sodium   Date Value Ref Range Status   05/24/2019 138 136 - 145 mmol/L Final     Potassium   Date Value Ref Range Status   05/24/2019 4.4 3.5 - 5.1 mmol/L Final     Chloride   Date Value Ref Range Status   05/24/2019 102 95 - 110 mmol/L Final     CO2   Date Value Ref Range Status   05/24/2019 27 23 - 29 mmol/L Final     BUN, Bld   Date Value Ref Range Status   05/24/2019 16 6 - 20 mg/dL Final     Creatinine   Date Value Ref Range Status   05/24/2019 1.3 0.5 - 1.4 mg/dL Final     Calcium   Date Value Ref Range Status   05/24/2019 10.1 8.7 - 10.5 mg/dL Final     Anion Gap   Date Value Ref Range Status   05/24/2019 9 8 - 16 mmol/L Final     eGFR if    Date Value Ref Range Status   05/24/2019 >60 >60 mL/min/1.73 m^2 Final     eGFR if non    Date Value Ref Range Status   05/24/2019 >60 >60 mL/min/1.73 m^2 Final     Comment:     Calculation used  to obtain the estimated glomerular filtration  rate (eGFR) is the CKD-EPI equation.        Estimated Creatinine Clearance: 67 mL/min (based on SCr of 1.3 mg/dL).    Assessment:     1. Coronary artery disease of native artery of native heart with stable angina pectoris    2. Diabetes mellitus without complication    3. History of non-ST elevation myocardial infarction (NSTEMI)    4. S/P coronary artery stent placement    5. Essential hypertension      Lipids improved.  Needs better compliance with diet meds exercise weight loss and low salt. He was counseled,meds called to pharmacy.  Plan:   Continue current therapy  Cardiac low salt diet.  Risk factor modification and excercise program.  F/u in 6 months with lipid cmp a1c.

## 2019-06-05 ENCOUNTER — HOSPITAL ENCOUNTER (EMERGENCY)
Facility: HOSPITAL | Age: 34
Discharge: HOME | End: 2019-06-05
Attending: EMERGENCY MEDICINE
Payer: COMMERCIAL

## 2019-06-05 VITALS
DIASTOLIC BLOOD PRESSURE: 71 MMHG | HEART RATE: 79 BPM | RESPIRATION RATE: 24 BRPM | SYSTOLIC BLOOD PRESSURE: 115 MMHG | OXYGEN SATURATION: 98 % | HEIGHT: 64 IN | TEMPERATURE: 97 F | WEIGHT: 149.25 LBS | BODY MASS INDEX: 25.48 KG/M2

## 2019-06-05 DIAGNOSIS — E87.5 HYPERKALEMIA: ICD-10-CM

## 2019-06-05 DIAGNOSIS — N28.9 RENAL INSUFFICIENCY: Primary | ICD-10-CM

## 2019-06-05 DIAGNOSIS — R53.1 WEAKNESS: ICD-10-CM

## 2019-06-05 DIAGNOSIS — R42 DIZZINESS: ICD-10-CM

## 2019-06-05 LAB
ALBUMIN SERPL BCP-MCNC: 4.6 G/DL (ref 3.5–5.2)
ALP SERPL-CCNC: 75 U/L (ref 55–135)
ALT SERPL W/O P-5'-P-CCNC: 25 U/L (ref 10–44)
ANION GAP SERPL CALC-SCNC: 10 MMOL/L (ref 8–16)
ANION GAP SERPL CALC-SCNC: 7 MMOL/L (ref 8–16)
AST SERPL-CCNC: 13 U/L (ref 10–40)
BASOPHILS # BLD AUTO: 0.01 K/UL (ref 0–0.2)
BASOPHILS NFR BLD: 0.1 % (ref 0–1.9)
BILIRUB SERPL-MCNC: 0.8 MG/DL (ref 0.1–1)
BILIRUB UR QL STRIP: NEGATIVE
BNP SERPL-MCNC: <10 PG/ML (ref 0–99)
BUN SERPL-MCNC: 47 MG/DL (ref 6–20)
BUN SERPL-MCNC: 49 MG/DL (ref 6–20)
CALCIUM SERPL-MCNC: 10.5 MG/DL (ref 8.7–10.5)
CALCIUM SERPL-MCNC: 9.6 MG/DL (ref 8.7–10.5)
CHLORIDE SERPL-SCNC: 102 MMOL/L (ref 95–110)
CHLORIDE SERPL-SCNC: 107 MMOL/L (ref 95–110)
CK SERPL-CCNC: 62 U/L (ref 20–200)
CK SERPL-CCNC: 72 U/L (ref 20–200)
CLARITY UR: CLEAR
CO2 SERPL-SCNC: 19 MMOL/L (ref 23–29)
CO2 SERPL-SCNC: 20 MMOL/L (ref 23–29)
COLOR UR: YELLOW
CREAT SERPL-MCNC: 1.6 MG/DL (ref 0.5–1.4)
CREAT SERPL-MCNC: 2 MG/DL (ref 0.5–1.4)
DIFFERENTIAL METHOD: ABNORMAL
EOSINOPHIL # BLD AUTO: 0.1 K/UL (ref 0–0.5)
EOSINOPHIL NFR BLD: 0.6 % (ref 0–8)
ERYTHROCYTE [DISTWIDTH] IN BLOOD BY AUTOMATED COUNT: 12.6 % (ref 11.5–14.5)
EST. GFR  (AFRICAN AMERICAN): 49 ML/MIN/1.73 M^2
EST. GFR  (AFRICAN AMERICAN): >60 ML/MIN/1.73 M^2
EST. GFR  (NON AFRICAN AMERICAN): 42 ML/MIN/1.73 M^2
EST. GFR  (NON AFRICAN AMERICAN): 55 ML/MIN/1.73 M^2
GLUCOSE SERPL-MCNC: 153 MG/DL (ref 70–110)
GLUCOSE SERPL-MCNC: 97 MG/DL (ref 70–110)
GLUCOSE UR QL STRIP: NEGATIVE
HCT VFR BLD AUTO: 43.7 % (ref 40–54)
HGB BLD-MCNC: 15 G/DL (ref 14–18)
HGB UR QL STRIP: NEGATIVE
KETONES UR QL STRIP: NEGATIVE
LEUKOCYTE ESTERASE UR QL STRIP: NEGATIVE
LYMPHOCYTES # BLD AUTO: 2.2 K/UL (ref 1–4.8)
LYMPHOCYTES NFR BLD: 17.7 % (ref 18–48)
MCH RBC QN AUTO: 32.3 PG (ref 27–31)
MCHC RBC AUTO-ENTMCNC: 34.3 G/DL (ref 32–36)
MCV RBC AUTO: 94 FL (ref 82–98)
MONOCYTES # BLD AUTO: 0.7 K/UL (ref 0.3–1)
MONOCYTES NFR BLD: 6 % (ref 4–15)
NEUTROPHILS # BLD AUTO: 9.4 K/UL (ref 1.8–7.7)
NEUTROPHILS NFR BLD: 75.6 % (ref 38–73)
NITRITE UR QL STRIP: NEGATIVE
PH UR STRIP: 5 [PH] (ref 5–8)
PLATELET # BLD AUTO: 315 K/UL (ref 150–350)
PMV BLD AUTO: 9.1 FL (ref 9.2–12.9)
POTASSIUM SERPL-SCNC: 5.6 MMOL/L (ref 3.5–5.1)
POTASSIUM SERPL-SCNC: 6.1 MMOL/L (ref 3.5–5.1)
PROT SERPL-MCNC: 7.8 G/DL (ref 6–8.4)
PROT UR QL STRIP: ABNORMAL
RBC # BLD AUTO: 4.64 M/UL (ref 4.6–6.2)
SODIUM SERPL-SCNC: 132 MMOL/L (ref 136–145)
SODIUM SERPL-SCNC: 133 MMOL/L (ref 136–145)
SP GR UR STRIP: >=1.03 (ref 1–1.03)
TROPONIN I SERPL DL<=0.01 NG/ML-MCNC: 0.01 NG/ML (ref 0–0.03)
URN SPEC COLLECT METH UR: ABNORMAL
UROBILINOGEN UR STRIP-ACNC: NEGATIVE EU/DL
WBC # BLD AUTO: 12.37 K/UL (ref 3.9–12.7)

## 2019-06-05 PROCEDURE — 99285 EMERGENCY DEPT VISIT HI MDM: CPT | Mod: 25

## 2019-06-05 PROCEDURE — 80048 BASIC METABOLIC PNL TOTAL CA: CPT

## 2019-06-05 PROCEDURE — 82550 ASSAY OF CK (CPK): CPT | Mod: 91

## 2019-06-05 PROCEDURE — 93005 ELECTROCARDIOGRAM TRACING: CPT

## 2019-06-05 PROCEDURE — 86703 HIV-1/HIV-2 1 RESULT ANTBDY: CPT

## 2019-06-05 PROCEDURE — 85025 COMPLETE CBC W/AUTO DIFF WBC: CPT

## 2019-06-05 PROCEDURE — 25000003 PHARM REV CODE 250: Performed by: EMERGENCY MEDICINE

## 2019-06-05 PROCEDURE — 80053 COMPREHEN METABOLIC PANEL: CPT

## 2019-06-05 PROCEDURE — 99244 OFF/OP CNSLTJ NEW/EST MOD 40: CPT | Mod: 25,,, | Performed by: INTERNAL MEDICINE

## 2019-06-05 PROCEDURE — 96360 HYDRATION IV INFUSION INIT: CPT

## 2019-06-05 PROCEDURE — 99244 PR OFFICE CONSULTATION,LEVEL IV: ICD-10-PCS | Mod: 25,,, | Performed by: INTERNAL MEDICINE

## 2019-06-05 PROCEDURE — 96361 HYDRATE IV INFUSION ADD-ON: CPT

## 2019-06-05 PROCEDURE — 93010 EKG 12-LEAD: ICD-10-PCS | Mod: ,,, | Performed by: INTERNAL MEDICINE

## 2019-06-05 PROCEDURE — 93010 ELECTROCARDIOGRAM REPORT: CPT | Mod: ,,, | Performed by: INTERNAL MEDICINE

## 2019-06-05 PROCEDURE — 81003 URINALYSIS AUTO W/O SCOPE: CPT

## 2019-06-05 PROCEDURE — 83880 ASSAY OF NATRIURETIC PEPTIDE: CPT

## 2019-06-05 PROCEDURE — 84484 ASSAY OF TROPONIN QUANT: CPT

## 2019-06-05 RX ADMIN — SODIUM CHLORIDE 1000 ML: 0.9 INJECTION, SOLUTION INTRAVENOUS at 01:06

## 2019-06-05 NOTE — CONSULTS
[facility] -   Cardiology  Consult Note    Patient Name: Peewee Soto Jr.  MRN: [id number]  Admission Date: 6/5/2019  Hospital Length of Stay: 0 days  Code Status: Prior   Attending Provider: Rudy Mann MD   Consulting Provider: Nikhil Richard MD  Primary Care Physician: Primary Doctor No  Principal Problem:<principal problem not specified>    Patient information was obtained from patient and ER records.     Inpatient consult to Cardiology  Consult performed by: Nikhli Richard MD  Consult ordered by: Rudy Mann MD        Subjective:       HPI:   33 yo male, consult for CAD s/p PCI  PMH CAD h/o NSTEMI s/p PCI mid Cl xDES x2 in  by Dr. Moreno, normal EF and NIDDM.  Run out of coreg after one month Rx and resumed 3 days ago. Developed dizziness and faint. Denied UTI and dehydration  In ER, BP 93/41 mmHG and Cr 2 /K 6.1. No chest pain, dyspnea and palpitation.  ekg NSr    Past Medical History:   Diagnosis Date    Coronary artery disease involving native coronary artery of native heart     Diabetes mellitus     Essential hypertension 3/29/2019    GERD (gastroesophageal reflux disease)     NSTEMI (non-ST elevated myocardial infarction)     S/P coronary artery stent placement     Undescended testicle before puberty        Past Surgical History:   Procedure Laterality Date    APPENDECTOMY N/A 11/11/2015    Performed by Osvaldo Guy [facility] at [facility] OR    CATHETERIZATION, HEART, LEFT Left 3/18/2019    Performed by Bill Moreno MD at [facility] CATH LAB    CORONARY ANGIOPLASTY      CORONARY STENT PLACEMENT      HERNIA REPAIR         Review of patient's allergies indicates:   Allergen Reactions    Pcn [penicillins] Rash       No current facility-administered medications on file prior to encounter.      Current Outpatient Medications on File Prior to Encounter   Medication Sig    aspirin 81 MG Chew Take 1 tablet (81 mg total) by mouth once daily.    atorvastatin (LIPITOR) 40 MG tablet  Take 1 tablet (40 mg total) by mouth once daily.    metFORMIN (GLUCOPHAGE) 500 MG tablet Take 2 tablets (1,000 mg total) by mouth 2 (two) times daily with meals.    prasugrel (EFFIENT) 10 mg Tab Take 1 tablet (10 mg total) by mouth once daily.    ranolazine (RANEXA) 500 MG Tb12 Take 1 tablet (500 mg total) by mouth 2 (two) times daily.    [DISCONTINUED] carvedilol (COREG) 12.5 MG tablet Take 12.5 mg by mouth 2 (two) times daily with meals.    [DISCONTINUED] lisinopril 10 MG tablet Take 1 tablet (10 mg total) by mouth once daily.     Family History     Problem Relation (Age of Onset)    Diabetes Mother    Heart disease Maternal Grandmother    Hypertension Father        Tobacco Use    Smoking status: Never Smoker    Smokeless tobacco: Never Used   Substance and Sexual Activity    Alcohol use: No    Drug use: No    Sexual activity: Never     Review of Systems   Constitution: Positive for malaise/fatigue. Negative for decreased appetite, diaphoresis, fever and night sweats.   HENT: Negative for nosebleeds.    Eyes: Negative for blurred vision and double vision.   Cardiovascular: Negative for chest pain, claudication, dyspnea on exertion, irregular heartbeat, leg swelling, near-syncope, orthopnea, palpitations, paroxysmal nocturnal dyspnea and syncope.   Respiratory: Negative for cough, shortness of breath, sleep disturbances due to breathing, snoring, sputum production and wheezing.    Endocrine: Negative for cold intolerance and polyuria.   Hematologic/Lymphatic: Does not bruise/bleed easily.   Skin: Negative for rash.   Musculoskeletal: Negative for back pain, falls, joint pain, joint swelling and neck pain.   Gastrointestinal: Negative for abdominal pain, heartburn, nausea and vomiting.   Genitourinary: Negative for dysuria, frequency and hematuria.   Neurological: Positive for dizziness and light-headedness. Negative for difficulty with concentration, focal weakness, headaches, numbness, seizures and  weakness.   Psychiatric/Behavioral: Negative for depression, memory loss and substance abuse. The patient does not have insomnia.    Allergic/Immunologic: Negative for HIV exposure and hives.     Objective:     Vital Signs (Most Recent):  Temp: 97.4 °F (36.3 °C) (06/05/19 1139)  Pulse: 76 (06/05/19 1502)  Resp: (!) 22 (06/05/19 1502)  BP: (!) 104/59 (06/05/19 1502)  SpO2: 97 % (06/05/19 1502) Vital Signs (24h Range):  Temp:  [97.4 °F (36.3 °C)] 97.4 °F (36.3 °C)  Pulse:  [68-87] 76  Resp:  [18-23] 22  SpO2:  [97 %-98 %] 97 %  BP: ()/(41-59) 104/59     Weight: 67.7 kg (149 lb 4 oz)  Body mass index is 25.62 kg/m².    SpO2: 97 %  O2 Device (Oxygen Therapy): room air      Intake/Output Summary (Last 24 hours) at 6/5/2019 1610  Last data filed at 6/5/2019 1534  Gross per 24 hour   Intake 1000 ml   Output --   Net 1000 ml       Lines/Drains/Airways     Peripheral Intravenous Line                 Peripheral IV - Single Lumen 06/05/19 1230 20 G Right Antecubital less than 1 day                Physical Exam   Constitutional: He is oriented to person, place, and time. He appears well-nourished.   HENT:   Head: Normocephalic.   Eyes: Pupils are equal, round, and reactive to light.   Neck: Normal carotid pulses and no JVD present. Carotid bruit is not present. No thyromegaly present.   Cardiovascular: Normal rate, regular rhythm, normal heart sounds and normal pulses.  No extrasystoles are present. PMI is not displaced. Exam reveals no gallop and no S3.   No murmur heard.  Pulmonary/Chest: Breath sounds normal. No stridor. No respiratory distress.   Abdominal: Soft. Bowel sounds are normal. There is no tenderness. There is no rebound.   Musculoskeletal: Normal range of motion.   Neurological: He is alert and oriented to person, place, and time.   Skin: Skin is intact. No rash noted.   Psychiatric: His behavior is normal.       Significant Labs:   ABG: No results for input(s): PH, PCO2, HCO3, POCSATURATED, BE in the last  48 hours., Blood Culture: No results for input(s): LABBLOO in the last 48 hours., BMP:   Recent Labs   Lab 06/05/19  1230 06/05/19  1534   * 97   * 133*   K 6.1* 5.6*    107   CO2 20* 19*   BUN 49* 47*   CREATININE 2.0* 1.6*   CALCIUM 10.5 9.6   , CMP   Recent Labs   Lab 06/05/19  1230 06/05/19  1534   * 133*   K 6.1* 5.6*    107   CO2 20* 19*   * 97   BUN 49* 47*   CREATININE 2.0* 1.6*   CALCIUM 10.5 9.6   PROT 7.8  --    ALBUMIN 4.6  --    BILITOT 0.8  --    ALKPHOS 75  --    AST 13  --    ALT 25  --    ANIONGAP 10 7*   ESTGFRAFRICA 49* >60   EGFRNONAA 42* 55*   , CBC   Recent Labs   Lab 06/05/19  1230   WBC 12.37   HGB 15.0   HCT 43.7      , INR No results for input(s): INR, PROTIME in the last 48 hours., Lipid Panel No results for input(s): CHOL, HDL, LDLCALC, TRIG, CHOLHDL in the last 48 hours. and Troponin   Recent Labs   Lab 06/05/19  1230   TROPONINI 0.012       Significant Imaging: EKG:      Assessment and Plan:     Coronary artery disease of native artery of native heart with stable angina pectoris  Ok to hold acei and coreg due to low BP, ARF and hyperkalemia  Continue iVF and repeat BMP  Continue ASA, Effient and statin  DASH  F/u with Amna in one week        VTE Risk Mitigation (From admission, onward)    None          Thank you for your consult. I will sign off. Please contact us if you have any additional questions.    Nikhil Richard MD  Cardiology   [facility]

## 2019-06-05 NOTE — HPI
35 yo male, consult for CAD s/p PCI  PMH CAD h/o NSTEMI s/p PCI mid Cl xDES x2 in  by Dr. Moreno, normal EF and NIDDM.  Run out of coreg after one month Rx and resumed 3 days ago. Developed dizziness and faint. Denied UTI and dehydration  In ER, BP 93/41 mmHG and Cr 2 /K 6.1. No chest pain, dyspnea and palpitation.  ekg NSr

## 2019-06-05 NOTE — SUBJECTIVE & OBJECTIVE
Past Medical History:   Diagnosis Date    Coronary artery disease involving native coronary artery of native heart     Diabetes mellitus     Essential hypertension 3/29/2019    GERD (gastroesophageal reflux disease)     NSTEMI (non-ST elevated myocardial infarction)     S/P coronary artery stent placement     Undescended testicle before puberty        Past Surgical History:   Procedure Laterality Date    APPENDECTOMY N/A 11/11/2015    Performed by [facility]do [facility] at [facility] OR    CATHETERIZATION, HEART, LEFT Left 3/18/2019    Performed by Bill Moreno MD at [facility] CATH LAB    CORONARY ANGIOPLASTY      CORONARY STENT PLACEMENT      HERNIA REPAIR         Review of patient's allergies indicates:   Allergen Reactions    Pcn [penicillins] Rash       No current facility-administered medications on file prior to encounter.      Current Outpatient Medications on File Prior to Encounter   Medication Sig    aspirin 81 MG Chew Take 1 tablet (81 mg total) by mouth once daily.    atorvastatin (LIPITOR) 40 MG tablet Take 1 tablet (40 mg total) by mouth once daily.    metFORMIN (GLUCOPHAGE) 500 MG tablet Take 2 tablets (1,000 mg total) by mouth 2 (two) times daily with meals.    prasugrel (EFFIENT) 10 mg Tab Take 1 tablet (10 mg total) by mouth once daily.    ranolazine (RANEXA) 500 MG Tb12 Take 1 tablet (500 mg total) by mouth 2 (two) times daily.    [DISCONTINUED] carvedilol (COREG) 12.5 MG tablet Take 12.5 mg by mouth 2 (two) times daily with meals.    [DISCONTINUED] lisinopril 10 MG tablet Take 1 tablet (10 mg total) by mouth once daily.     Family History     Problem Relation (Age of Onset)    Diabetes Mother    Heart disease Maternal Grandmother    Hypertension Father        Tobacco Use    Smoking status: Never Smoker    Smokeless tobacco: Never Used   Substance and Sexual Activity    Alcohol use: No    Drug use: No    Sexual activity: Never     Review of Systems   Constitution: Positive for  malaise/fatigue. Negative for decreased appetite, diaphoresis, fever and night sweats.   HENT: Negative for nosebleeds.    Eyes: Negative for blurred vision and double vision.   Cardiovascular: Negative for chest pain, claudication, dyspnea on exertion, irregular heartbeat, leg swelling, near-syncope, orthopnea, palpitations, paroxysmal nocturnal dyspnea and syncope.   Respiratory: Negative for cough, shortness of breath, sleep disturbances due to breathing, snoring, sputum production and wheezing.    Endocrine: Negative for cold intolerance and polyuria.   Hematologic/Lymphatic: Does not bruise/bleed easily.   Skin: Negative for rash.   Musculoskeletal: Negative for back pain, falls, joint pain, joint swelling and neck pain.   Gastrointestinal: Negative for abdominal pain, heartburn, nausea and vomiting.   Genitourinary: Negative for dysuria, frequency and hematuria.   Neurological: Positive for dizziness and light-headedness. Negative for difficulty with concentration, focal weakness, headaches, numbness, seizures and weakness.   Psychiatric/Behavioral: Negative for depression, memory loss and substance abuse. The patient does not have insomnia.    Allergic/Immunologic: Negative for HIV exposure and hives.     Objective:     Vital Signs (Most Recent):  Temp: 97.4 °F (36.3 °C) (06/05/19 1139)  Pulse: 76 (06/05/19 1502)  Resp: (!) 22 (06/05/19 1502)  BP: (!) 104/59 (06/05/19 1502)  SpO2: 97 % (06/05/19 1502) Vital Signs (24h Range):  Temp:  [97.4 °F (36.3 °C)] 97.4 °F (36.3 °C)  Pulse:  [68-87] 76  Resp:  [18-23] 22  SpO2:  [97 %-98 %] 97 %  BP: ()/(41-59) 104/59     Weight: 67.7 kg (149 lb 4 oz)  Body mass index is 25.62 kg/m².    SpO2: 97 %  O2 Device (Oxygen Therapy): room air      Intake/Output Summary (Last 24 hours) at 6/5/2019 1610  Last data filed at 6/5/2019 1534  Gross per 24 hour   Intake 1000 ml   Output --   Net 1000 ml       Lines/Drains/Airways     Peripheral Intravenous Line                  Peripheral IV - Single Lumen 06/05/19 1230 20 G Right Antecubital less than 1 day                Physical Exam   Constitutional: He is oriented to person, place, and time. He appears well-nourished.   HENT:   Head: Normocephalic.   Eyes: Pupils are equal, round, and reactive to light.   Neck: Normal carotid pulses and no JVD present. Carotid bruit is not present. No thyromegaly present.   Cardiovascular: Normal rate, regular rhythm, normal heart sounds and normal pulses.  No extrasystoles are present. PMI is not displaced. Exam reveals no gallop and no S3.   No murmur heard.  Pulmonary/Chest: Breath sounds normal. No stridor. No respiratory distress.   Abdominal: Soft. Bowel sounds are normal. There is no tenderness. There is no rebound.   Musculoskeletal: Normal range of motion.   Neurological: He is alert and oriented to person, place, and time.   Skin: Skin is intact. No rash noted.   Psychiatric: His behavior is normal.       Significant Labs:   ABG: No results for input(s): PH, PCO2, HCO3, POCSATURATED, BE in the last 48 hours., Blood Culture: No results for input(s): LABBLOO in the last 48 hours., BMP:   Recent Labs   Lab 06/05/19  1230 06/05/19  1534   * 97   * 133*   K 6.1* 5.6*    107   CO2 20* 19*   BUN 49* 47*   CREATININE 2.0* 1.6*   CALCIUM 10.5 9.6   , CMP   Recent Labs   Lab 06/05/19  1230 06/05/19  1534   * 133*   K 6.1* 5.6*    107   CO2 20* 19*   * 97   BUN 49* 47*   CREATININE 2.0* 1.6*   CALCIUM 10.5 9.6   PROT 7.8  --    ALBUMIN 4.6  --    BILITOT 0.8  --    ALKPHOS 75  --    AST 13  --    ALT 25  --    ANIONGAP 10 7*   ESTGFRAFRICA 49* >60   EGFRNONAA 42* 55*   , CBC   Recent Labs   Lab 06/05/19  1230   WBC 12.37   HGB 15.0   HCT 43.7      , INR No results for input(s): INR, PROTIME in the last 48 hours., Lipid Panel No results for input(s): CHOL, HDL, LDLCALC, TRIG, CHOLHDL in the last 48 hours. and Troponin   Recent Labs   Lab 06/05/19  1230    TROPONINI 0.012       Significant Imaging: EKG:

## 2019-06-05 NOTE — ED NOTES
Pt resting in bed on cardiac monitor. Pt denies becoming dizzy when walking to bathroom. Will continue to monitor

## 2019-06-05 NOTE — ASSESSMENT & PLAN NOTE
Ok to hold acei and coreg due to low BP, ARF and hyperkalemia  Continue iVF and repeat BMP  Continue ASA, Effient and statin  DASH  F/u with Amna in one week

## 2019-06-05 NOTE — ED PROVIDER NOTES
SCRIBE #1 NOTE: I, Corinne Mack, am scribing for, and in the presence of, Rudy Mann MD. I have scribed the entire note.      History      Chief Complaint   Patient presents with    Dizziness     c/o dizziness/lightheaded, nausea, and weakness        Review of patient's allergies indicates:   Allergen Reactions    Pcn [penicillins] Rash        HPI   HPI    6/5/2019, 12:03 PM   History obtained from the patient      History of Present Illness: Peewee Soto Jr. is a 34 y.o. male patient with PMHx of CAD. DM, HTN, GERD, and NSTEMI who presents to the Emergency Department for dizziness which onset gradually a few days ago. Pt reports that he has been non-compliant with his BP medication, but started taking it again a week ago. The pt now feels dizzy and his BP is low. Pt's BP is 93/41 in the ED. Symptoms are constant and moderate in severity. No mitigating or exacerbating factors reported. Associated sxs include nausea and generalized weakness. Patient denies any fever, chills, CP, SOB, N/V/D, abd pain, back pain, neck pain, HA, and all other sxs at this time. No prior Tx reported. No further complaints or concerns at this time.         Arrival mode: Personal vehicle    PCP: Primary Doctor No       Past Medical History:  Past Medical History:   Diagnosis Date    Coronary artery disease involving native coronary artery of native heart     Diabetes mellitus     Essential hypertension 3/29/2019    GERD (gastroesophageal reflux disease)     NSTEMI (non-ST elevated myocardial infarction)     S/P coronary artery stent placement     Undescended testicle before puberty        Past Surgical History:  Past Surgical History:   Procedure Laterality Date    APPENDECTOMY N/A 11/11/2015    Performed by [facility]do [facility] OR    CATHETERIZATION, HEART, LEFT Left 3/18/2019    Performed by Bill Moreno MD at [facility] CATH LAB    CORONARY ANGIOPLASTY      CORONARY STENT PLACEMENT      HERNIA REPAIR            Family History:  Family History   Problem Relation Age of Onset    Diabetes Mother     Hypertension Father     Heart disease Maternal Grandmother        Social History:  Social History     Tobacco Use    Smoking status: Never Smoker    Smokeless tobacco: Never Used   Substance and Sexual Activity    Alcohol use: No    Drug use: No    Sexual activity: Never       ROS   Review of Systems   Constitutional: Negative for chills and fever.   Respiratory: Negative for cough and shortness of breath.    Cardiovascular: Negative for chest pain and leg swelling.        (+) low BP   Gastrointestinal: Positive for nausea. Negative for abdominal pain, diarrhea and vomiting.   Musculoskeletal: Negative for back pain, neck pain and neck stiffness.   Skin: Negative for rash and wound.   Neurological: Positive for dizziness and weakness (generalized). Negative for light-headedness, numbness and headaches.   All other systems reviewed and are negative.    Physical Exam      Initial Vitals [06/05/19 1139]   BP Pulse Resp Temp SpO2   (!) 93/41 85 18 97.4 °F (36.3 °C) 98 %      MAP       --          Physical Exam  Nursing Notes and Vital Signs Reviewed.  Constitutional: Patient is in no acute distress. Well-developed and well-nourished.  Head: Atraumatic. Normocephalic.  Eyes: PERRL. EOM intact. Conjunctivae are not pale. No scleral icterus.  ENT: Mucous membranes are moist. Oropharynx is clear and symmetric.    Neck: Supple. Full ROM. No lymphadenopathy.  Cardiovascular: Regular rate. Regular rhythm. No murmurs, rubs, or gallops. Distal pulses are 2+ and symmetric.  Pulmonary/Chest: No respiratory distress. Clear to auscultation bilaterally. No wheezing or rales.  Abdominal: Soft and non-distended.  There is no tenderness.  No rebound, guarding, or rigidity.   Musculoskeletal: Moves all extremities. No obvious deformities. No edema.   Skin: Warm and dry.  Neurological:  Alert, awake, and appropriate.  Normal speech.  No  "acute focal neurological deficits are appreciated.  Psychiatric: Normal affect. Good eye contact. Appropriate in content.    ED Course    Procedures  ED Vital Signs:  Vitals:    06/05/19 1139 06/05/19 1231 06/05/19 1352 06/05/19 1353   BP: (!) 93/41  (!) 111/53 (!) 111/57   Pulse: 85 75 68 87   Resp: 18      Temp: 97.4 °F (36.3 °C)      TempSrc: Oral      SpO2: 98%      Weight: 67.7 kg (149 lb 4 oz)      Height: 5' 4" (1.626 m)       06/05/19 1354 06/05/19 1403 06/05/19 1502   BP: (!) 109/58 (!) 104/53 (!) 104/59   Pulse: 82 79 76   Resp:  (!) 23 (!) 22   Temp:      TempSrc:      SpO2:  97% 97%   Weight:      Height:          Abnormal Lab Results:  Labs Reviewed   CBC W/ AUTO DIFFERENTIAL - Abnormal; Notable for the following components:       Result Value    Mean Corpuscular Hemoglobin 32.3 (*)     MPV 9.1 (*)     Gran # (ANC) 9.4 (*)     Gran% 75.6 (*)     Lymph% 17.7 (*)     All other components within normal limits   COMPREHENSIVE METABOLIC PANEL - Abnormal; Notable for the following components:    Sodium 132 (*)     Potassium 6.1 (*)     CO2 20 (*)     Glucose 153 (*)     BUN, Bld 49 (*)     Creatinine 2.0 (*)     eGFR if  49 (*)     eGFR if non  42 (*)     All other components within normal limits   URINALYSIS, REFLEX TO URINE CULTURE - Abnormal; Notable for the following components:    Specific Gravity, UA >=1.030 (*)     Protein, UA Trace (*)     All other components within normal limits    Narrative:     Preferred Collection Type->Urine, Clean Catch   BASIC METABOLIC PANEL - Abnormal; Notable for the following components:    Sodium 133 (*)     Potassium 5.6 (*)     CO2 19 (*)     BUN, Bld 47 (*)     Creatinine 1.6 (*)     Anion Gap 7 (*)     eGFR if non  55 (*)     All other components within normal limits   B-TYPE NATRIURETIC PEPTIDE   CK   TROPONIN I   CK   HIV 1 / 2 ANTIBODY        All Lab Results:  Results for orders placed or performed during the hospital " encounter of 06/05/19   CBC auto differential   Result Value Ref Range    WBC 12.37 3.90 - 12.70 K/uL    RBC 4.64 4.60 - 6.20 M/uL    Hemoglobin 15.0 14.0 - 18.0 g/dL    Hematocrit 43.7 40.0 - 54.0 %    Mean Corpuscular Volume 94 82 - 98 fL    Mean Corpuscular Hemoglobin 32.3 (H) 27.0 - 31.0 pg    Mean Corpuscular Hemoglobin Conc 34.3 32.0 - 36.0 g/dL    RDW 12.6 11.5 - 14.5 %    Platelets 315 150 - 350 K/uL    MPV 9.1 (L) 9.2 - 12.9 fL    Gran # (ANC) 9.4 (H) 1.8 - 7.7 K/uL    Lymph # 2.2 1.0 - 4.8 K/uL    Mono # 0.7 0.3 - 1.0 K/uL    Eos # 0.1 0.0 - 0.5 K/uL    Baso # 0.01 0.00 - 0.20 K/uL    Gran% 75.6 (H) 38.0 - 73.0 %    Lymph% 17.7 (L) 18.0 - 48.0 %    Mono% 6.0 4.0 - 15.0 %    Eosinophil% 0.6 0.0 - 8.0 %    Basophil% 0.1 0.0 - 1.9 %    Differential Method Automated    Comprehensive metabolic panel   Result Value Ref Range    Sodium 132 (L) 136 - 145 mmol/L    Potassium 6.1 (H) 3.5 - 5.1 mmol/L    Chloride 102 95 - 110 mmol/L    CO2 20 (L) 23 - 29 mmol/L    Glucose 153 (H) 70 - 110 mg/dL    BUN, Bld 49 (H) 6 - 20 mg/dL    Creatinine 2.0 (H) 0.5 - 1.4 mg/dL    Calcium 10.5 8.7 - 10.5 mg/dL    Total Protein 7.8 6.0 - 8.4 g/dL    Albumin 4.6 3.5 - 5.2 g/dL    Total Bilirubin 0.8 0.1 - 1.0 mg/dL    Alkaline Phosphatase 75 55 - 135 U/L    AST 13 10 - 40 U/L    ALT 25 10 - 44 U/L    Anion Gap 10 8 - 16 mmol/L    eGFR if African American 49 (A) >60 mL/min/1.73 m^2    eGFR if non African American 42 (A) >60 mL/min/1.73 m^2   Urinalysis, Reflex to Urine Culture Urine, Clean Catch   Result Value Ref Range    Specimen UA Urine, Clean Catch     Color, UA Yellow Yellow, Straw, Sis    Appearance, UA Clear Clear    pH, UA 5.0 5.0 - 8.0    Specific Gravity, UA >=1.030 (A) 1.005 - 1.030    Protein, UA Trace (A) Negative    Glucose, UA Negative Negative    Ketones, UA Negative Negative    Bilirubin (UA) Negative Negative    Occult Blood UA Negative Negative    Nitrite, UA Negative Negative    Urobilinogen, UA Negative <2.0  EU/dL    Leukocytes, UA Negative Negative   Brain natriuretic peptide   Result Value Ref Range    BNP <10 0 - 99 pg/mL   CK   Result Value Ref Range    CPK 72 20 - 200 U/L   Troponin I   Result Value Ref Range    Troponin I 0.012 0.000 - 0.026 ng/mL   CK   Result Value Ref Range    CPK 62 20 - 200 U/L   Basic metabolic panel   Result Value Ref Range    Sodium 133 (L) 136 - 145 mmol/L    Potassium 5.6 (H) 3.5 - 5.1 mmol/L    Chloride 107 95 - 110 mmol/L    CO2 19 (L) 23 - 29 mmol/L    Glucose 97 70 - 110 mg/dL    BUN, Bld 47 (H) 6 - 20 mg/dL    Creatinine 1.6 (H) 0.5 - 1.4 mg/dL    Calcium 9.6 8.7 - 10.5 mg/dL    Anion Gap 7 (L) 8 - 16 mmol/L    eGFR if African American >60 >60 mL/min/1.73 m^2    eGFR if non African American 55 (A) >60 mL/min/1.73 m^2         Imaging Results:  Imaging Results          X-Ray Chest PA And Lateral (Final result)  Result time 06/05/19 12:54:15    Final result by Sang Ocampo III, MD (06/05/19 12:54:15)                 Impression:      Negative two-view chest x-ray.      Electronically signed by: Sang Ocampo  Date:    06/05/2019  Time:    12:54             Narrative:    EXAMINATION:  XR CHEST PA AND LATERAL    CLINICAL HISTORY:  weakness;    COMPARISON:  March    FINDINGS:  Heart size is normal. The lung fields are clear. No acute pulmonary infiltrate.                                 The EKG was ordered, reviewed, and independently interpreted by the ED provider.  Interpretation time: 1147  Rate: 74 BPM  Rhythm: normal sinus rhythm  Interpretation: Normal QRS. No STEMI.             The Emergency Provider reviewed the vital signs and test results, which are outlined above.    ED Discussion     1:23 PM: Re-evaluated pt. Pt is resting comfortably and is in no acute distress.  D/w pt all pertinent results. D/w pt any concerns expressed at this time. Answered all questions. Pt expresses understanding at this time.    2:34 PM: Dr. Mann discussed the pt's case with Dr. Richard  (Cardiology) who recommends continuing the pt's ASA, Effient, and statin. Hold the pt's beta blocker and ACE inhibiter.    2:45 PM: Re-evaluated pt. Pt is resting comfortably and is in no acute distress.  D/w pt all pertinent results. D/w pt any concerns expressed at this time. Answered all questions. Pt expresses understanding at this time.    4:03 PM: Reassessed pt at this time. Pt is awake, alert, and in no distress. Pt states he is feeling much better. Discussed with pt all pertinent ED information and results. Discussed pt dx and plan of tx. Gave pt all f/u and return to the ED instructions. All questions and concerns were addressed at this time. Pt expresses understanding of information and instructions, and is comfortable with plan to discharge. Pt is stable for discharge.    I discussed with patient and/or family/caretaker that evaluation in the ED does not suggest any emergent or life threatening medical conditions requiring immediate intervention beyond what was provided in the ED, and I believe patient is safe for discharge.  Regardless, an unremarkable evaluation in the ED does not preclude the development or presence of a serious of life threatening condition. As such, patient was instructed to return immediately for any worsening or change in current symptoms.      ED Medication(s):  Medications   sodium chloride 0.9% bolus 1,000 mL (0 mLs Intravenous Stopped 6/5/19 7353)          Medication List      ASK your doctor about these medications    aspirin 81 MG Chew  Take 1 tablet (81 mg total) by mouth once daily.     atorvastatin 40 MG tablet  Commonly known as:  LIPITOR  Take 1 tablet (40 mg total) by mouth once daily.     metFORMIN 500 MG tablet  Commonly known as:  GLUCOPHAGE  Take 2 tablets (1,000 mg total) by mouth 2 (two) times daily with meals.     prasugrel 10 mg Tab  Commonly known as:  EFFIENT  Take 1 tablet (10 mg total) by mouth once daily.     ranolazine 500 MG Tb12  Commonly known as:   RANEXA  Take 1 tablet (500 mg total) by mouth 2 (two) times daily.            Follow-up Information     Schedule an appointment as soon as possible for a visit  with [facility].    Specialty:  Internal Medicine  Contact information:  [address] [address] [facility] [address] [phone]  Additional information:  2nd Floor - From [address], take the [facility] exit ([address] Enter the facility from the [address]                   Medical Decision Making    Medical Decision Making:   Clinical Tests:   Lab Tests: Ordered and Reviewed  Radiological Study: Ordered and Reviewed  Medical Tests: Ordered and Reviewed           Scribe Attestation:   Scribe #1: I performed the above scribed service and the documentation accurately describes the services I performed. I attest to the accuracy of the note 06/05/2019.    Attending:   Physician Attestation Statement for Scribe #1: I, Rudy Mann MD, personally performed the services described in this documentation, as scribed by Corinne Mack, in my presence, and it is both accurate and complete.          Clinical Impression       ICD-10-CM ICD-9-CM   1. Renal insufficiency N28.9 593.9   2. Dizziness R42 780.4   3. Weakness R53.1 780.79   4. Hyperkalemia E87.5 276.7       Disposition:   Disposition: Discharged  Condition: Stable           Rudy Mann MD  06/05/19 5155

## 2019-06-06 LAB — HIV 1+2 AB+HIV1 P24 AG SERPL QL IA: NEGATIVE

## 2019-12-04 ENCOUNTER — SSC ENCOUNTER (OUTPATIENT)
Dept: ADMINISTRATIVE | Facility: OTHER | Age: 34
End: 2019-12-04

## 2019-12-04 NOTE — PROGRESS NOTES
Please note, patient's information has been sent to Outpatient Care Management for high risk screening.    Reason for Referral:   NON-ST ELEVATION (NSTEMI) MYOCARDIAL INFARCTION  CHEST PAIN, UNSPECIFIED    Please contact [facility] with any questions (ext. [phone]).    Thank you,    Shanna Apple  Outpatient Care Management  [phone]

## 2020-01-06 ENCOUNTER — OUTPATIENT CASE MANAGEMENT (OUTPATIENT)
Dept: ADMINISTRATIVE | Facility: OTHER | Age: 35
End: 2020-01-06

## 2020-01-06 NOTE — LETTER
January 6, 2020    Peewee Soto Jr.  [address] [place] [address]             [facility] [address] [facility] [place] [address] Dear Mr Soto,    I work with [facility]'s Outpatient Case Management Department. We received a referral to call you to discuss your medical history.These services are free of charge and are offered to [facility] patients who have recently been discharged from any of our facilities or who have complex medical conditions that may require the skill of a nurse to assist with management.             I am a Registered Nurse who specializes in connecting patients with available medical and financial resources as well as addressing any educational needs that may be indicated.      I attempted to reach you by telephone, but I was unsuccessful. Please call our department so that we can go over some questions with you regarding your health.    The Outpatient Case Management Department can be reached at [phone] from 8:00AM to 4:30 PM on Monday thru Friday. [facility] also has a program where a nurse is available 24/7 to answer questions or provide medical advice. Their number is [phone].      Thank you,      Susan Hammer RN  Outpatient Case Management  [phone]

## 2020-01-08 NOTE — PROGRESS NOTES
Outpatient Care Management  Initial Patient Assessment    Patient: Peewee Soto Jr.  MRN: [id number]  Date of Service: 01/06/2020  Completed by: Susan Mobley RN  Referral Date:   Program:     Reason for Visit   Patient presents with    OPCM RN First Assessment Attempt    OPCM RN Second Assessment Attempt    OPCM RN Third Assessment Attempt    Case Closure       Brief Summary: Unsuccessful phone contact attempts X 3. Letter mailed after first contact attempt requesting return call.     Assessment Documentation     OPCM Initial Assessment    Involvement of Care  Patient Reported Insurance  Current Health Status  Patient Health Rating  Patient Reported Labs & Vitals  Social Determinants  Advanced Care Planning  Support  Housing  Access to [employer]  Clinical Assessment  Medication Adherence  *Active medication list was reviewed and reconciled with patient and/or caregiver:    Nutritional Status  Labs  PHQ Depression Screen  Cognitive/Behavioral Health  Culture/Hindu  Communication  Health Literacy  Activities of Daily Living  Fall Risk  Equipment/Current Services  Community & Government Programs  Community Resource Assessment  Completion of Initial Assessment         Problem List and History     Patient Active Problem List   Diagnosis    Acute appendicitis with localized peritonitis    Diabetes mellitus without complication    History of non-ST elevation myocardial infarction (NSTEMI)    Coronary artery disease of native artery of native heart with stable angina pectoris    S/P coronary artery stent placement    Essential hypertension    Renal insufficiency       Reviewed Active Problem List with patient and/or Caregiver. The following were identified as areas of need: N/A    Medical History:  Reviewed medical history with patient and/or caregiver    Social History:  Reviewed social history with patient and/or caregiver    Complex Care Plan    Care plan was discussed and  completed today with input from patient and/or caregiver.    Goals    None         Patient Instructions     Instructions were provided via the [employer] patient resources and are available for the patient to view on the patient portal, if active.    Next steps: Will make no further attempts to contact. Susan Mobley RN    No follow-ups on file.    Todays OPCM Self-Management Care Plan was developed with the patients/caregivers input and was based on identified barriers from todays assessment.  Goals were written today with the patient/caregiver and the patient has agreed to work towards these goals to improve his/her overall well-being. Patient verbalized understanding of the care plan, goals, and all of today's instructions. Encouraged patient/caregiver to communicate with his/her physician and health care team about health conditions and the treatment plan.  Provided my contact information today and encouraged patient/caregiver to call me with any questions as needed.

## 2020-01-19 ENCOUNTER — HOSPITAL ENCOUNTER (EMERGENCY)
Facility: HOSPITAL | Age: 35
Discharge: HOME | End: 2020-01-19
Attending: EMERGENCY MEDICINE
Payer: MEDICAID

## 2020-01-19 VITALS
SYSTOLIC BLOOD PRESSURE: 162 MMHG | BODY MASS INDEX: 27.62 KG/M2 | TEMPERATURE: 100 F | RESPIRATION RATE: 18 BRPM | HEART RATE: 118 BPM | OXYGEN SATURATION: 99 % | WEIGHT: 160.94 LBS | DIASTOLIC BLOOD PRESSURE: 100 MMHG

## 2020-01-19 DIAGNOSIS — B34.9 VIRAL SYNDROME: ICD-10-CM

## 2020-01-19 DIAGNOSIS — Z76.0 MEDICATION REFILL: Primary | ICD-10-CM

## 2020-01-19 LAB
HIV 1+2 AB+HIV1 P24 AG SERPL QL IA: NEGATIVE
INFLUENZA A, MOLECULAR: NEGATIVE
INFLUENZA B, MOLECULAR: NEGATIVE
SPECIMEN SOURCE: NORMAL

## 2020-01-19 PROCEDURE — 25000003 PHARM REV CODE 250: Performed by: REGISTERED NURSE

## 2020-01-19 PROCEDURE — 99283 EMERGENCY DEPT VISIT LOW MDM: CPT

## 2020-01-19 PROCEDURE — 86703 HIV-1/HIV-2 1 RESULT ANTBDY: CPT

## 2020-01-19 PROCEDURE — 87502 INFLUENZA DNA AMP PROBE: CPT

## 2020-01-19 RX ORDER — ACETAMINOPHEN 500 MG
1000 TABLET ORAL
Status: COMPLETED | OUTPATIENT
Start: 2020-01-19 | End: 2020-01-19

## 2020-01-19 RX ADMIN — ACETAMINOPHEN 1000 MG: 500 TABLET ORAL at 06:01

## 2020-01-19 NOTE — ED PROVIDER NOTES
SCRIBE #1 NOTE: I, Vijay Chin, am scribing for, and in the presence of, LESTER Grossman JR.. I have scribed the entire note.       History     Chief Complaint   Patient presents with    Med Refill     Pt`s insurance does not cover Brilinta anymore. Pt is requesting generic version. Pt has a cough and runny nose that began yesterday     Review of patient's allergies indicates:   Allergen Reactions    Pcn [penicillins] Rash         History of Present Illness     HPI    1/19/2020, 5:58 PM  History obtained from the patient      History of Present Illness: Peewee Soto Jr. is a 34 y.o. male patient with a PMHx of CAD, DM, HTN, GERD who presents to the Emergency Department for a medication refill of Brilinta. Pt states that his insurance no longer covers Brilinta, and is requesting an Rx for the generic version at this time. Symptoms are constant and moderate in severity. No mitigating or exacerbating factors reported. Associated sxs include cough, rhinorrhea. Patient denies any fever, chills, sore throat, n/v/d, CP, SOB, HA, syncope, weakness, and all other sxs at this time. Pt reports positive sick contact at home. No further complaints or concerns at this time.         Arrival mode: Personal vehicle     PCP: Primary Doctor No        Past Medical History:  Past Medical History:   Diagnosis Date    Coronary artery disease involving native coronary artery of native heart     Diabetes mellitus     Essential hypertension 3/29/2019    GERD (gastroesophageal reflux disease)     NSTEMI (non-ST elevated myocardial infarction)     S/P coronary artery stent placement     Undescended testicle before puberty        Past Surgical History:  Past Surgical History:   Procedure Laterality Date    APPENDECTOMY N/A 11/11/2015    Procedure: APPENDECTOMY ;  Surgeon: Osvaldo Guy MD;  Location: [facility] OR;  Service: General;  Laterality: N/A;    CORONARY ANGIOPLASTY      CORONARY STENT PLACEMENT      HERNIA  REPAIR      LEFT HEART CATHETERIZATION Left 3/18/2019    Procedure: CATHETERIZATION, HEART, LEFT;  Surgeon: Bill Moreno MD;  [facility];  Service: Cardiology;  Laterality: Left;         Family History:  Family History   Problem Relation Age of Onset    Diabetes Mother     Hypertension Father     Heart disease Maternal Grandmother        Social History:  Social History     Tobacco Use    Smoking status: Never Smoker    Smokeless tobacco: Never Used   Substance and Sexual Activity    Alcohol use: No    Drug use: No    Sexual activity: Never        Review of Systems     Review of Systems   Constitutional: Negative for chills, diaphoresis and fever.        (+) requesting medication refill   HENT: Positive for rhinorrhea. Negative for congestion and sore throat.    Respiratory: Positive for cough. Negative for shortness of breath.    Cardiovascular: Negative for chest pain.   Gastrointestinal: Negative for abdominal pain, diarrhea, nausea and vomiting.   Genitourinary: Negative for dysuria.   Musculoskeletal: Negative for back pain.   Skin: Negative for rash.   Neurological: Negative for dizziness, seizures, syncope, weakness, light-headedness, numbness and headaches.   Hematological: Does not bruise/bleed easily.   All other systems reviewed and are negative.       Physical Exam     Initial Vitals [01/19/20 1754]   BP Pulse Resp Temp SpO2   (!) 163/111 (!) 126 18 100.1 °F (37.8 °C) 97 %      MAP       --          Physical Exam  Nursing Notes and Vital Signs Reviewed.  Constitutional: Patient is in no acute distress. Obese.  Head: Atraumatic. Normocephalic.  Eyes: PERRL. EOM intact. Conjunctivae are not pale. No scleral icterus.  ENT: Mucous membranes are moist. Oropharynx is clear and symmetric.    Neck: Supple. Full ROM. No lymphadenopathy.  Cardiovascular: Tachycardic. Regular rhythm. No murmurs, rubs, or gallops. Distal pulses are 2+ and symmetric.  Pulmonary/Chest: No respiratory distress.  Clear to auscultation bilaterally. No wheezing or rales.  Abdominal: Soft and non-distended.  There is no tenderness.  No rebound, guarding, or rigidity.  Genitourinary: No CVA tenderness  Musculoskeletal: Moves all extremities. No obvious deformities. No edema. No calf tenderness.  Skin: Warm and dry.  Neurological:  Alert, awake, and appropriate.  Normal speech.  No acute focal neurological deficits are appreciated.  Psychiatric: Normal affect. Good eye contact. Appropriate in content.     ED Course   Procedures  ED Vital Signs:  Vitals:    01/19/20 1754 01/19/20 1844 01/19/20 1910   BP: (!) 163/111  (!) 162/100   Pulse: (!) 126  (!) 118   Resp: 18  18   Temp: 100.1 °F (37.8 °C) 99.8 °F (37.7 °C) 99.9 °F (37.7 °C)   TempSrc: Oral Oral Oral   SpO2: 97%  99%   Weight: 73 kg (160 lb 15 oz)           The Emergency Provider reviewed the vital signs and test results, which are outlined above.     ED Discussion       6:01 PM:  Discussed with pt all pertinent ED information and results. Discussed pt dx and plan of tx. Gave pt all f/u and return to the ED instructions. All questions and concerns were addressed at this time. Pt expresses understanding of information and instructions, and is comfortable with plan to discharge. Pt is stable for discharge.     I discussed with patient and/or family/caretaker that evaluation in the ED does not suggest any emergent or life threatening medical conditions requiring immediate intervention beyond what was provided in the ED, and I believe patient is safe for discharge.  Regardless, an unremarkable evaluation in the ED does not preclude the development or presence of a serious of life threatening condition. As such, patient was instructed to return immediately for any worsening or change in current symptoms.                 ED Medication(s):  Medications   acetaminophen tablet 1,000 mg (1,000 mg Oral Given 1/19/20 1810)       Discharge Medication List as of 1/19/2020  7:20 PM       START taking these medications    Details   ticagrelor (BRILINTA) 90 mg tablet Take 1 tablet (90 mg total) by mouth 2 (two) times daily., Starting Sun 1/19/2020, Until Mon 1/18/2021, Print             Follow-up Information     [facility]    Specialty:  Emergency Medicine  Why:  If symptoms worsen  Contact information:  [address]  [facility] [address] [phone]                     Scribe Attestation:   Scribe #1: I performed the above scribed service and the documentation accurately describes the services I performed. I attest to the accuracy of the note.     Attending:   Physician Attestation Statement for Scribe #1: I, LESTER Grossman JR., personally performed the services described in this documentation, as scribed by Vijay Chin, in my presence, and it is both accurate and complete.           Clinical Impression       ICD-10-CM ICD-9-CM   1. Medication refill Z76.0 V68.1   2. Viral syndrome B34.9 079.99       Disposition:   Disposition: Discharged  Condition: Stable         LESTER Grossman Jr.  01/19/20 2053

## 2020-01-20 NOTE — ED NOTES
Pt c/o cough and runny nose x 2 days. Denies fever, n/v/d, CP, SOB, HA, or any other symptoms at this time.    Patient identifiers verified and correct for Peewee Andre [place] Jr..    LOC: The patient is awake, alert and aware of environment with an appropriate affect, the patient is oriented x 3 and speaking appropriately.  APPEARANCE: Patient resting comfortably and in no acute distress, patient is clean and well groomed, patient's clothing is properly fastened.  SKIN: The skin is warm and dry, color consistent with ethnicity, patient has normal skin turgor and moist mucus membranes, skin intact, no breakdown or bruising noted.  MUSCULOSKELETAL: Patient moving all extremities spontaneously.  RESPIRATORY: Airway is open and patent, respirations are spontaneous.  CARDIAC: Patient tachycardia, no peripheral edema noted, capillary refill < 3 seconds.  ABDOMEN: Soft and non tender to palpation.

## 2020-03-01 ENCOUNTER — HOSPITAL ENCOUNTER (EMERGENCY)
Facility: HOSPITAL | Age: 35
Discharge: HOME | End: 2020-03-02
Attending: FAMILY MEDICINE
Payer: MEDICAID

## 2020-03-01 DIAGNOSIS — Z95.5 S/P CORONARY ARTERY STENT PLACEMENT: ICD-10-CM

## 2020-03-01 DIAGNOSIS — E11.9 TYPE 2 DIABETES MELLITUS WITHOUT COMPLICATION, WITHOUT LONG-TERM CURRENT USE OF INSULIN: ICD-10-CM

## 2020-03-01 DIAGNOSIS — R07.9 CHEST PAIN: Primary | ICD-10-CM

## 2020-03-01 LAB
ALBUMIN SERPL BCP-MCNC: 4 G/DL (ref 3.5–5.2)
ALP SERPL-CCNC: 67 U/L (ref 55–135)
ALT SERPL W/O P-5'-P-CCNC: 73 U/L (ref 10–44)
ANION GAP SERPL CALC-SCNC: 11 MMOL/L (ref 8–16)
AST SERPL-CCNC: 24 U/L (ref 10–40)
BASOPHILS # BLD AUTO: 0.04 K/UL (ref 0–0.2)
BASOPHILS NFR BLD: 0.4 % (ref 0–1.9)
BILIRUB SERPL-MCNC: 0.3 MG/DL (ref 0.1–1)
BUN SERPL-MCNC: 17 MG/DL (ref 6–20)
CALCIUM SERPL-MCNC: 8.7 MG/DL (ref 8.7–10.5)
CHLORIDE SERPL-SCNC: 102 MMOL/L (ref 95–110)
CO2 SERPL-SCNC: 25 MMOL/L (ref 23–29)
CREAT SERPL-MCNC: 1 MG/DL (ref 0.5–1.4)
DIFFERENTIAL METHOD: ABNORMAL
EOSINOPHIL # BLD AUTO: 0.2 K/UL (ref 0–0.5)
EOSINOPHIL NFR BLD: 1.9 % (ref 0–8)
ERYTHROCYTE [DISTWIDTH] IN BLOOD BY AUTOMATED COUNT: 11.4 % (ref 11.5–14.5)
EST. GFR  (AFRICAN AMERICAN): >60 ML/MIN/1.73 M^2
EST. GFR  (NON AFRICAN AMERICAN): >60 ML/MIN/1.73 M^2
GLUCOSE SERPL-MCNC: 210 MG/DL (ref 70–110)
HCT VFR BLD AUTO: 43.4 % (ref 40–54)
HGB BLD-MCNC: 14.4 G/DL (ref 14–18)
IMM GRANULOCYTES # BLD AUTO: 0.04 K/UL (ref 0–0.04)
IMM GRANULOCYTES NFR BLD AUTO: 0.4 % (ref 0–0.5)
LYMPHOCYTES # BLD AUTO: 2.8 K/UL (ref 1–4.8)
LYMPHOCYTES NFR BLD: 31.1 % (ref 18–48)
MCH RBC QN AUTO: 31.1 PG (ref 27–31)
MCHC RBC AUTO-ENTMCNC: 33.2 G/DL (ref 32–36)
MCV RBC AUTO: 94 FL (ref 82–98)
MONOCYTES # BLD AUTO: 0.7 K/UL (ref 0.3–1)
MONOCYTES NFR BLD: 8.1 % (ref 4–15)
NEUTROPHILS # BLD AUTO: 5.2 K/UL (ref 1.8–7.7)
NEUTROPHILS NFR BLD: 58.1 % (ref 38–73)
NRBC BLD-RTO: 0 /100 WBC
PLATELET # BLD AUTO: 314 K/UL (ref 150–350)
PMV BLD AUTO: 9 FL (ref 9.2–12.9)
POTASSIUM SERPL-SCNC: 3.7 MMOL/L (ref 3.5–5.1)
PROT SERPL-MCNC: 6.9 G/DL (ref 6–8.4)
RBC # BLD AUTO: 4.63 M/UL (ref 4.6–6.2)
SODIUM SERPL-SCNC: 138 MMOL/L (ref 136–145)
WBC # BLD AUTO: 8.9 K/UL (ref 3.9–12.7)

## 2020-03-01 PROCEDURE — 36415 COLL VENOUS BLD VENIPUNCTURE: CPT

## 2020-03-01 PROCEDURE — 36000 PLACE NEEDLE IN VEIN: CPT

## 2020-03-01 PROCEDURE — 93010 EKG 12-LEAD: ICD-10-PCS | Mod: ,,, | Performed by: INTERNAL MEDICINE

## 2020-03-01 PROCEDURE — 93010 ELECTROCARDIOGRAM REPORT: CPT | Mod: ,,, | Performed by: INTERNAL MEDICINE

## 2020-03-01 PROCEDURE — 93005 ELECTROCARDIOGRAM TRACING: CPT

## 2020-03-01 PROCEDURE — 80053 COMPREHEN METABOLIC PANEL: CPT

## 2020-03-01 PROCEDURE — 25000003 PHARM REV CODE 250: Performed by: NURSE PRACTITIONER

## 2020-03-01 PROCEDURE — 85025 COMPLETE CBC W/AUTO DIFF WBC: CPT

## 2020-03-01 PROCEDURE — 84484 ASSAY OF TROPONIN QUANT: CPT

## 2020-03-01 PROCEDURE — 83880 ASSAY OF NATRIURETIC PEPTIDE: CPT

## 2020-03-01 PROCEDURE — 99285 EMERGENCY DEPT VISIT HI MDM: CPT | Mod: 25

## 2020-03-01 RX ORDER — ASPIRIN 325 MG
325 TABLET ORAL
Status: COMPLETED | OUTPATIENT
Start: 2020-03-01 | End: 2020-03-01

## 2020-03-01 RX ADMIN — ASPIRIN 325 MG: 325 TABLET ORAL at 11:03

## 2020-03-02 VITALS
WEIGHT: 159.31 LBS | RESPIRATION RATE: 20 BRPM | TEMPERATURE: 98 F | HEIGHT: 64 IN | OXYGEN SATURATION: 95 % | HEART RATE: 92 BPM | BODY MASS INDEX: 27.2 KG/M2 | DIASTOLIC BLOOD PRESSURE: 82 MMHG | SYSTOLIC BLOOD PRESSURE: 140 MMHG

## 2020-03-02 LAB
BNP SERPL-MCNC: <10 PG/ML (ref 0–99)
TROPONIN I SERPL DL<=0.01 NG/ML-MCNC: <0.006 NG/ML (ref 0–0.03)

## 2020-03-02 RX ORDER — METFORMIN HYDROCHLORIDE 500 MG/1
1000 TABLET ORAL 2 TIMES DAILY WITH MEALS
Qty: 120 TABLET | Refills: 0 | Status: SHIPPED | OUTPATIENT
Start: 2020-03-02 | End: 2020-10-27 | Stop reason: SDUPTHER

## 2020-03-02 RX ORDER — ATORVASTATIN CALCIUM 40 MG/1
40 TABLET, FILM COATED ORAL DAILY
Qty: 90 TABLET | Refills: 1 | Status: SHIPPED | OUTPATIENT
Start: 2020-03-02 | End: 2020-03-11 | Stop reason: SDUPTHER

## 2020-03-02 RX ORDER — PRASUGREL 10 MG/1
10 TABLET, FILM COATED ORAL DAILY
Qty: 30 TABLET | Refills: 11 | Status: SHIPPED | OUTPATIENT
Start: 2020-03-02 | End: 2020-03-11 | Stop reason: ALTCHOICE

## 2020-03-02 RX ORDER — RANOLAZINE 500 MG/1
500 TABLET, EXTENDED RELEASE ORAL 2 TIMES DAILY
Qty: 60 TABLET | Refills: 1 | Status: SHIPPED | OUTPATIENT
Start: 2020-03-02 | End: 2020-03-11 | Stop reason: ALTCHOICE

## 2020-03-02 NOTE — ED PROVIDER NOTES
34 year old male presents to the ER with chest pain. Patient is out of his medications for MI in the past.      Pt understands that a workup will begin in the treatment lounge/results waiting areas due to there being no available beds. Pt also undertstands they will be placed in the next available bed where they will be seen and dispositioned by a physician. I am removing myself from the care of pt. Pt will be assigned to next available physician.     Cristian Hearn [facility] [address]     SCRIBE #1 NOTE: I, Shanna Ramirez, am scribing for, and in the presence of, Radha Christianson MD. I have scribed the entire note.       History     Chief Complaint   Patient presents with    Chest Pain     MI last year to day he has soreness in chest, out of all his meds and a productive cough      Review of patient's allergies indicates:   Allergen Reactions    Pcn [penicillins] Rash         History of Present Illness     HPI    3/1/2020, 12:45 AM  History obtained from the patient      History of Present Illness: Peewee Soto Jr. is a 34 y.o. male patient with a PMHx of essential HTN, CAD, DM, GERD, and NSTEMI and PSHx of left heart cath, coronary stent placement and coronary angioplasty who presents to the Emergency Department for evaluation of intermittent CP which onset gradually a few days ago. Symptoms are moderate in severity. No mitigating or exacerbating factors reported. Associated sxs include productive cough. Patient denies any fever, chills, numbness, weakness, n/v/d, leg swelling, palpitations and all other sxs at this time. No further complaints or concerns at this time.       Arrival mode: Personal vehicle      PCP: Primary Doctor No        Past Medical History:  Past Medical History:   Diagnosis Date    Coronary artery disease involving native coronary artery of native heart     Diabetes mellitus     Essential hypertension 3/29/2019    GERD (gastroesophageal reflux disease)     NSTEMI (non-ST elevated  myocardial infarction)     S/P coronary artery stent placement     Undescended testicle before puberty        Past Surgical History:  Past Surgical History:   Procedure Laterality Date    APPENDECTOMY N/A 11/11/2015    Procedure: APPENDECTOMY ;  Surgeon: Osvaldo Guy MD;  Location: [facility] [place] OR;  Service: General;  Laterality: N/A;    CORONARY ANGIOPLASTY      CORONARY STENT PLACEMENT      HERNIA REPAIR      LEFT HEART CATHETERIZATION Left 3/18/2019    Procedure: CATHETERIZATION, HEART, LEFT;  Surgeon: Bill Moreno MD;  Location: [facility] [place] CATH LAB;  Service: Cardiology;  Laterality: Left;         Family History:  Family History   Problem Relation Age of Onset    Diabetes Mother     Hypertension Father     Heart disease Maternal Grandmother        Social History:  Social History     Tobacco Use    Smoking status: Never Smoker    Smokeless tobacco: Never Used   Substance and Sexual Activity    Alcohol use: No    Drug use: No    Sexual activity: Never        Review of Systems     Review of Systems   Constitutional: Negative for chills and fever.   HENT: Negative for sore throat.    Respiratory: Positive for cough (productive). Negative for shortness of breath.    Cardiovascular: Positive for chest pain (intermittent). Negative for palpitations and leg swelling.   Gastrointestinal: Negative for diarrhea, nausea and vomiting.   Genitourinary: Negative for dysuria.   Musculoskeletal: Negative for back pain.   Skin: Negative for rash.   Neurological: Negative for weakness and numbness.   Hematological: Does not bruise/bleed easily.   All other systems reviewed and are negative.     Physical Exam     Initial Vitals [03/01/20 2244]   BP Pulse Resp Temp SpO2   (!) 194/92 103 17 97.9 °F (36.6 °C) 98 %      MAP       --          Physical Exam  Nursing Notes and Vital Signs Reviewed.  Constitutional: Patient is in no acute distress. Well-developed and well-nourished.  Head: Atraumatic. Normocephalic.  Eyes: PERRL. EOM  "intact. Conjunctivae are not pale. No scleral icterus.  ENT: Mucous membranes are moist. Oropharynx is clear and symmetric.    Neck: Supple. Full ROM. No lymphadenopathy.  Cardiovascular: Regular rate. Regular rhythm. No murmurs, rubs, or gallops. Distal pulses are 2+ and symmetric.  Pulmonary/Chest: No respiratory distress. Clear to auscultation bilaterally. No wheezing or rales.  Abdominal: Soft and non-distended.  There is no tenderness.  No rebound, guarding, or rigidity. Good bowel sounds.  Genitourinary: No CVA tenderness  Musculoskeletal: Moves all extremities. No obvious deformities. No edema. No calf tenderness.  Skin: Warm and dry.  Neurological:  Alert, awake, and appropriate.  Normal speech.  No acute focal neurological deficits are appreciated.  Psychiatric: Normal affect. Good eye contact. Appropriate in content.     ED Course   Procedures  ED Vital Signs:  Vitals:    03/01/20 2244 03/01/20 2304 03/02/20 0022 03/02/20 0051   BP: (!) 194/92  139/83 (!) 159/87   Pulse: 103 101 92 93   Resp: 17  20 20   Temp: 97.9 °F (36.6 °C)      TempSrc: Oral      SpO2: 98%  97% 96%   Weight: 72.3 kg (159 lb 4.5 oz)      Height: 5' 4" (1.626 m)       03/02/20 0111   BP: (!) 140/82   Pulse: 92   Resp: 20   Temp:    TempSrc:    SpO2: 95%   Weight:    Height:        Abnormal Lab Results:  Labs Reviewed   CBC W/ AUTO DIFFERENTIAL - Abnormal; Notable for the following components:       Result Value    Mean Corpuscular Hemoglobin 31.1 (*)     RDW 11.4 (*)     MPV 9.0 (*)     All other components within normal limits   COMPREHENSIVE METABOLIC PANEL - Abnormal; Notable for the following components:    Glucose 210 (*)     ALT 73 (*)     All other components within normal limits   TROPONIN I   B-TYPE NATRIURETIC PEPTIDE        All Lab Results:  Results for orders placed or performed during the hospital encounter of 03/01/20   CBC auto differential   Result Value Ref Range    WBC 8.90 3.90 - 12.70 K/uL    RBC 4.63 4.60 - 6.20 " M/uL    Hemoglobin 14.4 14.0 - 18.0 g/dL    Hematocrit 43.4 40.0 - 54.0 %    Mean Corpuscular Volume 94 82 - 98 fL    Mean Corpuscular Hemoglobin 31.1 (H) 27.0 - 31.0 pg    Mean Corpuscular Hemoglobin Conc 33.2 32.0 - 36.0 g/dL    RDW 11.4 (L) 11.5 - 14.5 %    Platelets 314 150 - 350 K/uL    MPV 9.0 (L) 9.2 - 12.9 fL    Immature Granulocytes 0.4 0.0 - 0.5 %    Gran # (ANC) 5.2 1.8 - 7.7 K/uL    Immature Grans (Abs) 0.04 0.00 - 0.04 K/uL    Lymph # 2.8 1.0 - 4.8 K/uL    Mono # 0.7 0.3 - 1.0 K/uL    Eos # 0.2 0.0 - 0.5 K/uL    Baso # 0.04 0.00 - 0.20 K/uL    nRBC 0 0 /100 WBC    Gran% 58.1 38.0 - 73.0 %    Lymph% 31.1 18.0 - 48.0 %    Mono% 8.1 4.0 - 15.0 %    Eosinophil% 1.9 0.0 - 8.0 %    Basophil% 0.4 0.0 - 1.9 %    Differential Method Automated    Comprehensive metabolic panel   Result Value Ref Range    Sodium 138 136 - 145 mmol/L    Potassium 3.7 3.5 - 5.1 mmol/L    Chloride 102 95 - 110 mmol/L    CO2 25 23 - 29 mmol/L    Glucose 210 (H) 70 - 110 mg/dL    BUN, Bld 17 6 - 20 mg/dL    Creatinine 1.0 0.5 - 1.4 mg/dL    Calcium 8.7 8.7 - 10.5 mg/dL    Total Protein 6.9 6.0 - 8.4 g/dL    Albumin 4.0 3.5 - 5.2 g/dL    Total Bilirubin 0.3 0.1 - 1.0 mg/dL    Alkaline Phosphatase 67 55 - 135 U/L    AST 24 10 - 40 U/L    ALT 73 (H) 10 - 44 U/L    Anion Gap 11 8 - 16 mmol/L    eGFR if African American >60 >60 mL/min/1.73 m^2    eGFR if non African American >60 >60 mL/min/1.73 m^2   Troponin I #1   Result Value Ref Range    Troponin I <0.006 0.000 - 0.026 ng/mL   B-Type natriuretic peptide (BNP)   Result Value Ref Range    BNP <10 0 - 99 pg/mL     Imaging Results:  Imaging Results          X-Ray Chest PA And Lateral (Final result)  Result time 03/01/20 23:27:37    Final result by Thony Shelley Jr., MD (03/01/20 23:27:37)                 Impression:      1. No acute chest findings.      Electronically signed by: Thony Shelley Jr., MD  Date:    03/01/2020  Time:    23:27             Narrative:    EXAMINATION:  XR CHEST PA  AND LATERAL    CLINICAL HISTORY:  chest pain;    COMPARISON:  06/05/2019    FINDINGS:  The lungs are clear.  The cardiac silhouette and mediastinum are within normal limits.  The bones and remaining soft tissues are within normal limits.  No effusion or pneumothorax.                                 The EKG was ordered, reviewed, and independently interpreted by the ED provider.  Interpretation time: 2255  Rate: 103 BPM  Rhythm: sinus tachycardia  Interpretation: Moderate voltage criteria for LVH, may be normal variant. No STEMI.         The Emergency Provider reviewed the vital signs and test results, which are outlined above.     ED Discussion     12:50 AM: Assessed pt at this time. Discussed with pt all pertinent ED information and results. Discussed pt dx and plan of tx. Gave pt all f/u and return to the ED instructions. All questions and concerns were addressed at this time. Pt expresses understanding of information and instructions, and is comfortable with plan to discharge. Pt is stable for discharge.    I discussed with patient and/or family/caretaker that evaluation in the ED does not suggest any emergent or life threatening medical conditions requiring immediate intervention beyond what was provided in the ED, and I believe patient is safe for discharge.  Regardless, an unremarkable evaluation in the ED does not preclude the development or presence of a serious of life threatening condition. As such, patient was instructed to return immediately for any worsening or change in current symptoms.         Medical Decision Making:   Clinical Tests:   Lab Tests: Reviewed and Ordered  Radiological Study: Ordered and Reviewed  Medical Tests: Reviewed and Ordered           ED Medication(s):  Medications   aspirin tablet 325 mg (325 mg Oral Given 3/1/20 5647)       Discharge Medication List as of 3/2/2020 12:44 AM          Follow-up Information     Schedule an appointment as soon as possible for a visit  with Primary  Doctor No.                     Scribe Attestation:   Scribe #1: I performed the above scribed service and the documentation accurately describes the services I performed. I attest to the accuracy of the note.     Attending:   Physician Attestation Statement for Scribe #1: I, Radha Christianson MD, personally performed the services described in this documentation, as scribed by Shanna Ramirez, in my presence, and it is both accurate and complete.           Clinical Impression       ICD-10-CM ICD-9-CM   1. Chest pain R07.9 786.50   2. Type 2 diabetes mellitus without complication, without long-term current use of insulin E11.9 250.00   3. S/P coronary artery stent placement Z95.5 V45.82       Disposition:   Disposition: Discharged  Condition: Stable       Radha Christianson MD  03/02/20 2016

## 2020-03-11 ENCOUNTER — OFFICE VISIT (OUTPATIENT)
Dept: CARDIOLOGY | Facility: CLINIC | Age: 35
End: 2020-03-11
Payer: MEDICAID

## 2020-03-11 VITALS
WEIGHT: 158.5 LBS | BODY MASS INDEX: 27.06 KG/M2 | HEIGHT: 64 IN | SYSTOLIC BLOOD PRESSURE: 162 MMHG | HEART RATE: 106 BPM | OXYGEN SATURATION: 98 % | DIASTOLIC BLOOD PRESSURE: 108 MMHG

## 2020-03-11 DIAGNOSIS — I25.118 CORONARY ARTERY DISEASE OF NATIVE ARTERY OF NATIVE HEART WITH STABLE ANGINA PECTORIS: ICD-10-CM

## 2020-03-11 DIAGNOSIS — E11.9 DIABETES MELLITUS WITHOUT COMPLICATION: ICD-10-CM

## 2020-03-11 DIAGNOSIS — I25.2 HISTORY OF NON-ST ELEVATION MYOCARDIAL INFARCTION (NSTEMI): Primary | ICD-10-CM

## 2020-03-11 DIAGNOSIS — I10 ESSENTIAL HYPERTENSION: ICD-10-CM

## 2020-03-11 DIAGNOSIS — N28.9 RENAL INSUFFICIENCY: ICD-10-CM

## 2020-03-11 PROCEDURE — 99213 OFFICE O/P EST LOW 20 MIN: CPT | Mod: PBBFAC | Performed by: PHYSICIAN ASSISTANT

## 2020-03-11 PROCEDURE — 99999 PR PBB SHADOW E&M-EST. PATIENT-LVL III: ICD-10-PCS | Mod: PBBFAC,,, | Performed by: PHYSICIAN ASSISTANT

## 2020-03-11 PROCEDURE — 99214 PR OFFICE/OUTPT VISIT, EST, LEVL IV, 30-39 MIN: ICD-10-PCS | Mod: S$PBB,,, | Performed by: PHYSICIAN ASSISTANT

## 2020-03-11 PROCEDURE — 99214 OFFICE O/P EST MOD 30 MIN: CPT | Mod: S$PBB,,, | Performed by: PHYSICIAN ASSISTANT

## 2020-03-11 PROCEDURE — 99999 PR PBB SHADOW E&M-EST. PATIENT-LVL III: CPT | Mod: PBBFAC,,, | Performed by: PHYSICIAN ASSISTANT

## 2020-03-11 RX ORDER — CLOPIDOGREL BISULFATE 75 MG/1
75 TABLET ORAL DAILY
Qty: 30 TABLET | Refills: 11 | Status: SHIPPED | OUTPATIENT
Start: 2020-03-11 | End: 2020-10-27 | Stop reason: SDUPTHER

## 2020-03-11 RX ORDER — CARVEDILOL 6.25 MG/1
6.25 TABLET ORAL 2 TIMES DAILY WITH MEALS
Qty: 60 TABLET | Refills: 11 | Status: SHIPPED | OUTPATIENT
Start: 2020-03-11 | End: 2020-06-02 | Stop reason: SDUPTHER

## 2020-03-11 RX ORDER — ATORVASTATIN CALCIUM 40 MG/1
40 TABLET, FILM COATED ORAL DAILY
Qty: 90 TABLET | Refills: 1 | Status: SHIPPED | OUTPATIENT
Start: 2020-03-11 | End: 2020-10-27 | Stop reason: SDUPTHER

## 2020-03-11 NOTE — PROGRESS NOTES
Subjective:    Patient ID:  Peewee Soto Jr. is a 34 y.o. male who presents for follow-up of CAD, HTN      HPI  Mr. Soto is a 34 year old male patient whose current medical conditions include uncontrolled DM, HTN, and CAD s/p  NSTEMI with PCI of LCX and OM1 who presents today for follow-up. He returns today and states he is doing ok. Recently lost his job, has been having issues with no insurance coverage/affording medications. States he ran out of his meds but has taking ASA and Prasurgrel. Requesting to switch to Plavix after he finishes this prescription. Apparently was taking Brilinta and Effient together, despite being told to stop Brilinta previously. States he was taken off of Coreg and ACEi due to hypotension. Cardiac wise, today in clinic he appears stable. No chest pain, heaviness, or tightness. Reports he had ED visit about a week ago for pleuritic discomfort, which has since resolved. Workup at that time (troponin was negative). No SOB/VARGAS. No lightheadedness, dizziness, near syncope, or syncope. No s/s suggestive of CHF. BP elevated but patient asymptomatic. No s/s of TIA/CVA.     I had prolonged conversation with patient regarding importance of medication compliance. I will switch Prasurgrel to Clopidogrel after he finishes this prescription. I personally looked up all meds on [facility]----Plavix will be 15/mo; atorvastatin 15/mo; Coreg 4/mo.    Reviewed recent labs from ED.      Review of Systems   Constitution: Negative for chills, decreased appetite, fever and malaise/fatigue.   HENT: Positive for congestion (recovering from a URI). Negative for hoarse voice and sore throat.    Eyes: Negative for blurred vision and discharge.   Cardiovascular: Negative for chest pain, claudication, cyanosis, dyspnea on exertion, irregular heartbeat, leg swelling, near-syncope, orthopnea, palpitations and paroxysmal nocturnal dyspnea.   Respiratory: Negative for cough, hemoptysis, shortness of breath,  "snoring, sputum production and wheezing.    Endocrine: Negative for cold intolerance and heat intolerance.   Hematologic/Lymphatic: Negative for bleeding problem. Does not bruise/bleed easily.   Skin: Negative for rash.   Musculoskeletal: Negative for arthritis, back pain, joint pain, joint swelling, muscle cramps, muscle weakness and myalgias.   Gastrointestinal: Negative for abdominal pain, constipation, diarrhea, heartburn, melena and nausea.   Genitourinary: Negative for hematuria.   Neurological: Negative for dizziness, focal weakness, headaches, light-headedness, loss of balance, numbness, paresthesias, seizures and weakness.   Psychiatric/Behavioral: Negative for memory loss. The patient does not have insomnia.    Allergic/Immunologic: Negative for hives.     BP (!) 142/106 (BP Location: Right arm)   Pulse 106   Ht 5' 4" (1.626 m)   Wt 71.9 kg (158 lb 8.2 oz)   SpO2 98%   BMI 27.21 kg/m²     Objective:    Physical Exam   Constitutional: He is oriented to person, place, and time. He appears well-developed and well-nourished. No distress.   HENT:   Head: Normocephalic and atraumatic.   Eyes: Pupils are equal, round, and reactive to light. Right eye exhibits no discharge. Left eye exhibits no discharge.   Neck: Neck supple. No JVD present.   Cardiovascular: Normal rate, regular rhythm, S1 normal, S2 normal and normal heart sounds.   No murmur heard.  Pulmonary/Chest: Effort normal and breath sounds normal. No respiratory distress. He has no wheezes. He has no rales.   Abdominal: Soft. He exhibits no distension.   Musculoskeletal: He exhibits no edema.   Neurological: He is alert and oriented to person, place, and time.   Skin: Skin is warm and dry. He is not diaphoretic. No erythema.   Psychiatric: He has a normal mood and affect. His behavior is normal. Thought content normal.   Nursing note and vitals reviewed.    E. Angiographic Results     Diagnostic:          Ramus artery was present.        - Left " Main Coronary Artery:             The ostial LM is normal. There is CECY 3 flow.     - Left Anterior Descending Artery:             The LAD has luminal irregularities. There is CECY 3 flow.  the ramus is normal .     - Left Circumflex Artery:             The LCX is normal. There is CECY 3 flow.             The mid LCX has a 90% stenosis. There is CECY 3 flow. the lesion extends into the proximal portion of om1 the lesion is eccentric hazzy irregular contour of 90% in magnitude. the p[roximal om1 is diffusely diseased.     - OM1:             The OM1 is diffusely diseased (75). There is CECY 3 flow.     - Right Coronary Artery:             The RCA has luminal irregularities. There is CECY 3 flow.      Intervention          Ostial LM:              The following items were used: Blln [place] 2.5 X 20 and Blln [place] 2.5 X 08.       Mid LCX:              The lesion was successfully intervened. Post-stenosis of 0% and post-CECY 3 flow. The vessel was accessed natively.  The following items were used: Blln [place] 2.5 X 20, Stent [place] 2.25 X 22 (MICHELLE) and Blln [place] 2.5 X 08.       OM1:              The lesion was successfully intervened. Post-stenosis of 0% and post-CECY 3 flow. The vessel was accessed natively.  The following items were used: Blln [place] 2.5 X 20, Stent [place] 2.25 X 22 (MICHELLE) and Blln [place] 2.5 X 08.  the lesion is extending into the om1 from mid lcx. it s at a bifurcation point of om1/om2.the patient lesion was intervened upon in the mid lcx and om1 and the stent was deployed across the om2.it extyends from the lcx into om1 proximal.  F. Details of Procedure       2D Echo CONCLUSIONS     1 - Concentric hypertrophy.     2 - No wall motion abnormalities.     3 - Normal left ventricular systolic function (EF 55-60%).     4 - Normal left ventricular diastolic function.     5 - Normal right ventricular systolic function .   Assessment:       1. History of non-ST elevation myocardial infarction (NSTEMI)    2.  Essential hypertension    3. Coronary artery disease of native artery of native heart with stable angina pectoris    4. Diabetes mellitus without complication    5. Renal insufficiency      Patient presents for f/u. Doing ok. Stable CV wise. BP elevated due to medication non-compliance. Needs to re-start Coreg. Will plan to switch to Plavix AFTER he finishes this prescription of Prasugrel. He was counseled extensively on importance of medication compliance. I personally reviewed/ meds and verified cost on [employer] website.   Plan:   -Start Coreg 6.125 mg BID  -Continue ASA, Prasugrel, Statin  -Switch to Plavix 75 mg daily AFTER this prescription of Prasugrel is finished  -Nurse visit in 2 weeks  -Cardiac low salt diet/DM control

## 2020-03-25 ENCOUNTER — CLINICAL SUPPORT (OUTPATIENT)
Dept: CARDIOLOGY | Facility: CLINIC | Age: 35
End: 2020-03-25
Payer: MEDICAID

## 2020-03-25 VITALS
HEIGHT: 64 IN | SYSTOLIC BLOOD PRESSURE: 130 MMHG | OXYGEN SATURATION: 97 % | HEART RATE: 95 BPM | DIASTOLIC BLOOD PRESSURE: 78 MMHG | WEIGHT: 160.94 LBS | BODY MASS INDEX: 27.48 KG/M2

## 2020-03-25 PROCEDURE — 99213 OFFICE O/P EST LOW 20 MIN: CPT | Mod: PBBFAC

## 2020-03-25 PROCEDURE — 99999 PR PBB SHADOW E&M-EST. PATIENT-LVL III: ICD-10-PCS | Mod: PBBFAC,,,

## 2020-03-25 PROCEDURE — 99999 PR PBB SHADOW E&M-EST. PATIENT-LVL III: CPT | Mod: PBBFAC,,,

## 2020-03-25 NOTE — PROGRESS NOTES
Pt came in today for nurse visit. Pt feeling good no chest pain, SOB or weakness. Pt has been taking medication as instructed. Per SAMANTA Melogza to continue the low salt diet. 6 week follow up with SAMANTA Melgoza clinic visit. Pt verbalizes understanding.  Per Plan Start Coreg 6.125 mg BID  -Continue ASA, Prasugrel, Statin  -Switch to Plavix 75 mg daily AFTER this prescription of Prasugrel is finished  -Nurse visit in 2 weeks  -Cardiac low salt diet/DM control  BP during the visit today was 136/90 and 130/78

## 2020-05-19 ENCOUNTER — TELEPHONE (OUTPATIENT)
Dept: CARDIOLOGY | Facility: CLINIC | Age: 35
End: 2020-05-19

## 2020-05-19 NOTE — TELEPHONE ENCOUNTER
I have attempted without success to contact this patient by phone about virtual appointment scheduled today with Dr. Moreno. There was no answer. Unable to leave voicemail for both numbers provided.

## 2020-06-02 ENCOUNTER — OFFICE VISIT (OUTPATIENT)
Dept: CARDIOLOGY | Facility: CLINIC | Age: 35
End: 2020-06-02
Payer: MEDICAID

## 2020-06-02 ENCOUNTER — LAB VISIT (OUTPATIENT)
Dept: LAB | Facility: HOSPITAL | Age: 35
End: 2020-06-02
Attending: PHYSICIAN ASSISTANT
Payer: MEDICAID

## 2020-06-02 VITALS
WEIGHT: 155.44 LBS | DIASTOLIC BLOOD PRESSURE: 100 MMHG | OXYGEN SATURATION: 96 % | HEIGHT: 64 IN | BODY MASS INDEX: 26.54 KG/M2 | SYSTOLIC BLOOD PRESSURE: 144 MMHG | HEART RATE: 92 BPM

## 2020-06-02 DIAGNOSIS — I25.118 CORONARY ARTERY DISEASE OF NATIVE ARTERY OF NATIVE HEART WITH STABLE ANGINA PECTORIS: ICD-10-CM

## 2020-06-02 DIAGNOSIS — I10 ESSENTIAL HYPERTENSION: ICD-10-CM

## 2020-06-02 DIAGNOSIS — I25.2 HISTORY OF NON-ST ELEVATION MYOCARDIAL INFARCTION (NSTEMI): ICD-10-CM

## 2020-06-02 DIAGNOSIS — R94.31 NONSPECIFIC ABNORMAL ELECTROCARDIOGRAM (ECG) (EKG): ICD-10-CM

## 2020-06-02 DIAGNOSIS — R07.89 ATYPICAL CHEST PAIN: ICD-10-CM

## 2020-06-02 DIAGNOSIS — E11.9 DIABETES MELLITUS WITHOUT COMPLICATION: ICD-10-CM

## 2020-06-02 DIAGNOSIS — N28.9 RENAL INSUFFICIENCY: ICD-10-CM

## 2020-06-02 DIAGNOSIS — Z95.5 S/P CORONARY ARTERY STENT PLACEMENT: ICD-10-CM

## 2020-06-02 DIAGNOSIS — I25.118 CORONARY ARTERY DISEASE OF NATIVE ARTERY OF NATIVE HEART WITH STABLE ANGINA PECTORIS: Primary | ICD-10-CM

## 2020-06-02 LAB
ALBUMIN SERPL BCP-MCNC: 4.4 G/DL (ref 3.5–5.2)
ALP SERPL-CCNC: 98 U/L (ref 55–135)
ALT SERPL W/O P-5'-P-CCNC: 66 U/L (ref 10–44)
ANION GAP SERPL CALC-SCNC: 11 MMOL/L (ref 8–16)
AST SERPL-CCNC: 20 U/L (ref 10–40)
BILIRUB SERPL-MCNC: 0.4 MG/DL (ref 0.1–1)
BUN SERPL-MCNC: 10 MG/DL (ref 6–20)
CALCIUM SERPL-MCNC: 10.2 MG/DL (ref 8.7–10.5)
CHLORIDE SERPL-SCNC: 101 MMOL/L (ref 95–110)
CO2 SERPL-SCNC: 28 MMOL/L (ref 23–29)
CREAT SERPL-MCNC: 1.1 MG/DL (ref 0.5–1.4)
EST. GFR  (AFRICAN AMERICAN): >60 ML/MIN/1.73 M^2
EST. GFR  (NON AFRICAN AMERICAN): >60 ML/MIN/1.73 M^2
GLUCOSE SERPL-MCNC: 307 MG/DL (ref 70–110)
POTASSIUM SERPL-SCNC: 4.6 MMOL/L (ref 3.5–5.1)
PROT SERPL-MCNC: 7.8 G/DL (ref 6–8.4)
SODIUM SERPL-SCNC: 140 MMOL/L (ref 136–145)

## 2020-06-02 PROCEDURE — 93010 EKG 12-LEAD: ICD-10-PCS | Mod: ,,, | Performed by: INTERNAL MEDICINE

## 2020-06-02 PROCEDURE — 99214 PR OFFICE/OUTPT VISIT, EST, LEVL IV, 30-39 MIN: ICD-10-PCS | Mod: S$PBB,,, | Performed by: PHYSICIAN ASSISTANT

## 2020-06-02 PROCEDURE — 93005 ELECTROCARDIOGRAM TRACING: CPT

## 2020-06-02 PROCEDURE — 99213 OFFICE O/P EST LOW 20 MIN: CPT | Mod: PBBFAC,25 | Performed by: PHYSICIAN ASSISTANT

## 2020-06-02 PROCEDURE — 80053 COMPREHEN METABOLIC PANEL: CPT

## 2020-06-02 PROCEDURE — 99214 OFFICE O/P EST MOD 30 MIN: CPT | Mod: S$PBB,,, | Performed by: PHYSICIAN ASSISTANT

## 2020-06-02 PROCEDURE — 36415 COLL VENOUS BLD VENIPUNCTURE: CPT

## 2020-06-02 PROCEDURE — 99999 PR PBB SHADOW E&M-EST. PATIENT-LVL III: ICD-10-PCS | Mod: PBBFAC,,, | Performed by: PHYSICIAN ASSISTANT

## 2020-06-02 PROCEDURE — 99999 PR PBB SHADOW E&M-EST. PATIENT-LVL III: CPT | Mod: PBBFAC,,, | Performed by: PHYSICIAN ASSISTANT

## 2020-06-02 PROCEDURE — 93010 ELECTROCARDIOGRAM REPORT: CPT | Mod: ,,, | Performed by: INTERNAL MEDICINE

## 2020-06-02 RX ORDER — CARVEDILOL 12.5 MG/1
12.5 TABLET ORAL 2 TIMES DAILY WITH MEALS
Qty: 60 TABLET | Refills: 11 | Status: SHIPPED | OUTPATIENT
Start: 2020-06-02 | End: 2020-10-27 | Stop reason: SDUPTHER

## 2020-06-02 NOTE — PROGRESS NOTES
Subjective:    Patient ID:  Peewee Soto Jr. is a 35 y.o. male who presents for follow-up of CAD, HTN      HPI  Mr. Soto is a 34 year old male patient whose current medical conditions include uncontrolled DM, HTN, and CAD s/p  NSTEMI with PCI of LCX and OM1 who presents today for follow-up. Patient previously seen by me and restarted on optimal therapy for CAD/HTN. He returns today and states he is doing ok. Reports he has been having intermittent bouts of left-sided chest tightness over the past few months. Can happen at anytime, sometimes at rest and sometimes after lifting heavy objects. Feels like a sore muscle.  Denies any radiation of pain or any associated lightheadedness, dizziness, near syncope, or syncope. No s/s of CHF. BP remains elevated. Patient reports compliance with his medications with exception of Metformin. Needs to f/u with his PCP, he stated he will do so soon. No s/s of TIA/CVA. EKG today shows SR, LVH.     Review of Systems   Constitution: Negative for chills, decreased appetite, fever and malaise/fatigue.   HENT: Negative for congestion, hoarse voice and sore throat.    Eyes: Negative for blurred vision and discharge.   Cardiovascular: Positive for chest pain (atypical). Negative for claudication, cyanosis, dyspnea on exertion, irregular heartbeat, leg swelling, near-syncope, orthopnea, palpitations and paroxysmal nocturnal dyspnea.   Respiratory: Negative for cough, hemoptysis, shortness of breath, snoring, sputum production and wheezing.    Endocrine: Negative for cold intolerance and heat intolerance.   Hematologic/Lymphatic: Negative for bleeding problem. Does not bruise/bleed easily.   Skin: Negative for rash.   Musculoskeletal: Negative for arthritis, back pain, joint pain, joint swelling, muscle cramps, muscle weakness and myalgias.   Gastrointestinal: Negative for abdominal pain, constipation, diarrhea, heartburn, melena and nausea.   Genitourinary: Negative for  "hematuria.   Neurological: Negative for dizziness, focal weakness, headaches, light-headedness, loss of balance, numbness, paresthesias, seizures and weakness.   Psychiatric/Behavioral: Negative for memory loss. The patient does not have insomnia.    Allergic/Immunologic: Negative for hives.     BP (!) 142/106 (BP Location: Right arm)   Pulse 106   Ht 5' 4" (1.626 m)   Wt 71.9 kg (158 lb 8.2 oz)   SpO2 98%   BMI 27.21 kg/m²     Objective:    Physical Exam   Constitutional: He is oriented to person, place, and time. He appears well-developed and well-nourished. No distress.   HENT:   Head: Normocephalic and atraumatic.   Eyes: Pupils are equal, round, and reactive to light. Right eye exhibits no discharge. Left eye exhibits no discharge.   Neck: Neck supple. No JVD present.   Cardiovascular: Normal rate, regular rhythm, S1 normal, S2 normal and normal heart sounds.   No murmur heard.  Pulmonary/Chest: Effort normal and breath sounds normal. No respiratory distress. He has no wheezes. He has no rales.   Abdominal: Soft. He exhibits no distension.   Musculoskeletal: He exhibits no edema.   Neurological: He is alert and oriented to person, place, and time.   Skin: Skin is warm and dry. He is not diaphoretic. No erythema.   Psychiatric: He has a normal mood and affect. His behavior is normal. Thought content normal.   Nursing note and vitals reviewed.    E. Angiographic Results     Diagnostic:          Ramus artery was present.        - Left Main Coronary Artery:             The ostial LM is normal. There is CECY 3 flow.     - Left Anterior Descending Artery:             The LAD has luminal irregularities. There is CECY 3 flow.  the ramus is normal .     - Left Circumflex Artery:             The LCX is normal. There is CECY 3 flow.             The mid LCX has a 90% stenosis. There is CECY 3 flow. the lesion extends into the proximal portion of om1 the lesion is eccentric hazzy irregular contour of 90% in magnitude. " the p[roximal om1 is diffusely diseased.     - OM1:             The OM1 is diffusely diseased (75). There is CECY 3 flow.     - Right Coronary Artery:             The RCA has luminal irregularities. There is CECY 3 flow.      Intervention          Ostial LM:              The following items were used: Blln [place] 2.5 X 20 and Blln Quantum 2.5 X 08.       Mid LCX:              The lesion was successfully intervened. Post-stenosis of 0% and post-CECY 3 flow. The vessel was accessed natively.  The following items were used: Blln [place] 2.5 X 20, Stent [place] 2.25 X 22 (MICHELLE) and Blln Quantum 2.5 X 08.       OM1:              The lesion was successfully intervened. Post-stenosis of 0% and post-CECY 3 flow. The vessel was accessed natively.  The following items were used: Blln [place] 2.5 X 20, Stent [place] 2.25 X 22 (MICHELLE) and Blln Quantum 2.5 X 08.  the lesion is extending into the om1 from mid lcx. it s at a bifurcation point of om1/om2.the patient lesion was intervened upon in the mid lcx and om1 and the stent was deployed across the om2.it extyends from the lcx into om1 proximal.  F. Details of Procedure       2D Echo CONCLUSIONS     1 - Concentric hypertrophy.     2 - No wall motion abnormalities.     3 - Normal left ventricular systolic function (EF 55-60%).     4 - Normal left ventricular diastolic function.     5 - Normal right ventricular systolic function .   Assessment:       1. Coronary artery disease of native artery of native heart with stable angina pectoris    2. Essential hypertension    3. History of non-ST elevation myocardial infarction (NSTEMI)    4. S/P coronary artery stent placement    5. Renal insufficiency    6. Diabetes mellitus without complication    7. Atypical chest pain      Patient presents for f/u. Doing ok, having some episodes of atypical chest pain. Non-exertional in nature, likely in part related to uncontrolled HTN. Increase Coreg to 12.5 mg BID. Check CMP on board, to make sure creatinine and  electrolytes are stable, if so will start ACEi/ARB. Check MPI stress test given history/risk factors. ED if any severe symptoms.   Plan:   -Increase Coreg to 12.5 mg BID  -Continue ASA, Plavix, statin  -CMP today, will start ACEi/ARB if WNL  -Needs to see PCP ASAP for DM meds/prescription  -MPI stress test in next 3-4 weeks  -ED if any severe symptoms

## 2020-06-09 ENCOUNTER — TELEPHONE (OUTPATIENT)
Dept: CARDIOLOGY | Facility: HOSPITAL | Age: 35
End: 2020-06-09

## 2020-06-09 NOTE — TELEPHONE ENCOUNTER
The patient has been notified of this information and all questions answered.    Please phone patient. Labs reviewed. CMP stable but his glucose is elevated.     Has he seen his PCP to get Metformin refilled?    * Mr Larsen stated that he has not been to see his PCP in a while and he will call their office to make an appt.

## 2020-06-09 NOTE — TELEPHONE ENCOUNTER
Please phone patient. Labs reviewed. CMP stable but his glucose is elevated.    Has he seen his PCP to get Metformin refilled?

## 2020-06-25 ENCOUNTER — HOSPITAL ENCOUNTER (OUTPATIENT)
Dept: RADIOLOGY | Facility: HOSPITAL | Age: 35
Discharge: HOME | End: 2020-06-25
Attending: PHYSICIAN ASSISTANT
Payer: MEDICAID

## 2020-06-25 ENCOUNTER — HOSPITAL ENCOUNTER (OUTPATIENT)
Dept: CARDIOLOGY | Facility: HOSPITAL | Age: 35
Discharge: HOME | End: 2020-06-25
Attending: PHYSICIAN ASSISTANT
Payer: MEDICAID

## 2020-06-25 DIAGNOSIS — N28.9 RENAL INSUFFICIENCY: ICD-10-CM

## 2020-06-25 DIAGNOSIS — I25.2 HISTORY OF NON-ST ELEVATION MYOCARDIAL INFARCTION (NSTEMI): ICD-10-CM

## 2020-06-25 DIAGNOSIS — Z95.5 S/P CORONARY ARTERY STENT PLACEMENT: ICD-10-CM

## 2020-06-25 DIAGNOSIS — I10 ESSENTIAL HYPERTENSION: ICD-10-CM

## 2020-06-25 DIAGNOSIS — R94.31 NONSPECIFIC ABNORMAL ELECTROCARDIOGRAM (ECG) (EKG): ICD-10-CM

## 2020-06-25 DIAGNOSIS — E11.9 DIABETES MELLITUS WITHOUT COMPLICATION: ICD-10-CM

## 2020-06-25 DIAGNOSIS — I25.118 CORONARY ARTERY DISEASE OF NATIVE ARTERY OF NATIVE HEART WITH STABLE ANGINA PECTORIS: ICD-10-CM

## 2020-06-25 DIAGNOSIS — R07.89 ATYPICAL CHEST PAIN: ICD-10-CM

## 2020-06-25 LAB
CV STRESS BASE HR: 68 BPM
DIASTOLIC BLOOD PRESSURE: 92 MMHG
NUC REST EJECTION FRACTION: 71
NUC STRESS EJECTION FRACTION: 71 %
OHS CV CPX 85 PERCENT MAX PREDICTED HEART RATE MALE: 157
OHS CV CPX ESTIMATED METS: 1
OHS CV CPX MAX PREDICTED HEART RATE: 185
OHS CV CPX PATIENT IS FEMALE: 0
OHS CV CPX PATIENT IS MALE: 1
OHS CV CPX PEAK DIASTOLIC BLOOD PRESSURE: 101 MMHG
OHS CV CPX PEAK HEAR RATE: 115 BPM
OHS CV CPX PEAK RATE PRESSURE PRODUCT: NORMAL
OHS CV CPX PEAK SYSTOLIC BLOOD PRESSURE: 135 MMHG
OHS CV CPX PERCENT MAX PREDICTED HEART RATE ACHIEVED: 62
OHS CV CPX RATE PRESSURE PRODUCT PRESENTING: 8092
STRESS ECHO POST EXERCISE DUR MIN: 1 MINUTES
STRESS ECHO POST EXERCISE DUR SEC: 3 SECONDS
SYSTOLIC BLOOD PRESSURE: 119 MMHG

## 2020-06-25 PROCEDURE — 78452 STRESS TEST WITH MYOCARDIAL PERFUSION (CUPID ONLY): ICD-10-PCS | Mod: 26,,, | Performed by: INTERNAL MEDICINE

## 2020-06-25 PROCEDURE — 93016 STRESS TEST WITH MYOCARDIAL PERFUSION (CUPID ONLY): ICD-10-PCS | Mod: ,,, | Performed by: INTERNAL MEDICINE

## 2020-06-25 PROCEDURE — A9502 TC99M TETROFOSMIN: HCPCS

## 2020-06-25 PROCEDURE — 93017 CV STRESS TEST TRACING ONLY: CPT

## 2020-06-25 PROCEDURE — 93018 STRESS TEST WITH MYOCARDIAL PERFUSION (CUPID ONLY): ICD-10-PCS | Mod: ,,, | Performed by: INTERNAL MEDICINE

## 2020-06-25 PROCEDURE — 93016 CV STRESS TEST SUPVJ ONLY: CPT | Mod: ,,, | Performed by: INTERNAL MEDICINE

## 2020-06-25 PROCEDURE — 93018 CV STRESS TEST I&R ONLY: CPT | Mod: ,,, | Performed by: INTERNAL MEDICINE

## 2020-06-25 PROCEDURE — 78452 HT MUSCLE IMAGE SPECT MULT: CPT | Mod: 26,,, | Performed by: INTERNAL MEDICINE

## 2020-06-25 RX ORDER — REGADENOSON 0.08 MG/ML
0.4 INJECTION, SOLUTION INTRAVENOUS ONCE
Status: COMPLETED | OUTPATIENT
Start: 2020-06-25 | End: 2020-06-25

## 2020-06-25 RX ADMIN — REGADENOSON 0.4 MG: 0.08 INJECTION, SOLUTION INTRAVENOUS at 09:06

## 2020-06-26 ENCOUNTER — TELEPHONE (OUTPATIENT)
Dept: CARDIOLOGY | Facility: CLINIC | Age: 35
End: 2020-06-26

## 2020-06-26 NOTE — TELEPHONE ENCOUNTER
I was unable to reach Mr Vides to give him his test results but no answer and no voicemail.    Per Amna: He passed his stress test. Labs reviewed. CMP ok except glucose is elevated. A1c shows uncontrolled glucose---make sure he is taking Metformin. Needs endocrinology follow-up. Lipids ok triglycerides elevated which will improve once his sugar improves.     Make sure he has a 3 mo f/u.     Thanks

## 2020-06-26 NOTE — TELEPHONE ENCOUNTER
----- Message from Amna Butcher PA-C sent at 6/26/2020  3:06 PM CDT -----  Please phone patient. He passed his stress test. Labs reviewed. CMP ok except glucose is elevated. A1c shows uncontrolled glucose-make sure he is taking Metformin. Needs endocrinology follow-up. Lipids ok triglycerides elevated which will improve once his sugar improves.     Make sure he has a 3 mo f/u.    Thanks

## 2020-07-15 ENCOUNTER — OFFICE VISIT (OUTPATIENT)
Dept: CARDIOLOGY | Facility: CLINIC | Age: 35
End: 2020-07-15
Payer: MEDICAID

## 2020-07-15 VITALS
HEART RATE: 80 BPM | SYSTOLIC BLOOD PRESSURE: 128 MMHG | OXYGEN SATURATION: 97 % | HEIGHT: 64 IN | DIASTOLIC BLOOD PRESSURE: 100 MMHG | BODY MASS INDEX: 26.69 KG/M2 | WEIGHT: 156.31 LBS

## 2020-07-15 DIAGNOSIS — N28.9 RENAL INSUFFICIENCY: ICD-10-CM

## 2020-07-15 DIAGNOSIS — I25.118 CORONARY ARTERY DISEASE OF NATIVE ARTERY OF NATIVE HEART WITH STABLE ANGINA PECTORIS: Primary | ICD-10-CM

## 2020-07-15 DIAGNOSIS — Z95.5 S/P CORONARY ARTERY STENT PLACEMENT: ICD-10-CM

## 2020-07-15 DIAGNOSIS — I25.2 HISTORY OF NON-ST ELEVATION MYOCARDIAL INFARCTION (NSTEMI): ICD-10-CM

## 2020-07-15 DIAGNOSIS — I10 ESSENTIAL HYPERTENSION: ICD-10-CM

## 2020-07-15 DIAGNOSIS — E11.9 DIABETES MELLITUS WITHOUT COMPLICATION: ICD-10-CM

## 2020-07-15 PROCEDURE — 99999 PR PBB SHADOW E&M-EST. PATIENT-LVL III: ICD-10-PCS | Mod: PBBFAC,,, | Performed by: INTERNAL MEDICINE

## 2020-07-15 PROCEDURE — 99214 PR OFFICE/OUTPT VISIT, EST, LEVL IV, 30-39 MIN: ICD-10-PCS | Mod: S$PBB,,, | Performed by: INTERNAL MEDICINE

## 2020-07-15 PROCEDURE — 99213 OFFICE O/P EST LOW 20 MIN: CPT | Mod: PBBFAC | Performed by: INTERNAL MEDICINE

## 2020-07-15 PROCEDURE — 99999 PR PBB SHADOW E&M-EST. PATIENT-LVL III: CPT | Mod: PBBFAC,,, | Performed by: INTERNAL MEDICINE

## 2020-07-15 PROCEDURE — 99214 OFFICE O/P EST MOD 30 MIN: CPT | Mod: S$PBB,,, | Performed by: INTERNAL MEDICINE

## 2020-07-15 RX ORDER — LOSARTAN POTASSIUM 25 MG/1
25 TABLET ORAL DAILY
Qty: 90 TABLET | Refills: 3 | Status: SHIPPED | OUTPATIENT
Start: 2020-07-15 | End: 2020-10-27 | Stop reason: SDUPTHER

## 2020-07-15 NOTE — PROGRESS NOTES
Subjective:   Patient ID:  Peewee Soto Jr. is a 35 y.o. male who presents for follow up of Coronary Artery Disease and Hypertension      HPI  A 34 yo male with cad nstemi htn hlp diabetes is here for rf/u he ahs been non compliant with diet ran out of metformin a1cd 12/8/5/2020 he needs also salt compliance no smoking he  Drinks intermittently ha sno angina no chest pain or shortness of breath has no headache blurred vision. He is not on losartan    Past Medical History:   Diagnosis Date    Coronary artery disease involving native coronary artery of native heart     Diabetes mellitus     Essential hypertension 3/29/2019    GERD (gastroesophageal reflux disease)     NSTEMI (non-ST elevated myocardial infarction)     S/P coronary artery stent placement     Undescended testicle before puberty        Past Surgical History:   Procedure Laterality Date    APPENDECTOMY N/A 11/11/2015    Procedure: APPENDECTOMY ;  Surgeon: Osvaldo Guy MD;  Location: [facility] OR;  Service: General;  Laterality: N/A;    CORONARY ANGIOPLASTY      CORONARY STENT PLACEMENT      HERNIA REPAIR      LEFT HEART CATHETERIZATION Left 3/18/2019    Procedure: CATHETERIZATION, HEART, LEFT;  Surgeon: Bill Moreno MD;  Location: [facility] CATH LAB;  Service: Cardiology;  Laterality: Left;       Social History     Tobacco Use    Smoking status: Never Smoker    Smokeless tobacco: Never Used   Substance Use Topics    Alcohol use: No    Drug use: No       Family History   Problem Relation Age of Onset    Diabetes Mother     Hypertension Father     Heart disease Maternal Grandmother        Current Outpatient Medications   Medication Sig    aspirin 81 MG Chew Take 1 tablet (81 mg total) by mouth once daily.    atorvastatin (LIPITOR) 40 MG tablet Take 1 tablet (40 mg total) by mouth once daily.    carvediloL (COREG) 12.5 MG tablet Take 1 tablet (12.5 mg total) by mouth 2 (two) times daily with meals.    clopidogreL (PLAVIX) 75 mg  tablet Take 1 tablet (75 mg total) by mouth once daily.    metFORMIN (GLUCOPHAGE) 500 MG tablet Take 2 tablets (1,000 mg total) by mouth 2 (two) times daily with meals.     No current facility-administered medications for this visit.      Current Outpatient Medications on File Prior to Visit   Medication Sig    aspirin 81 MG Chew Take 1 tablet (81 mg total) by mouth once daily.    atorvastatin (LIPITOR) 40 MG tablet Take 1 tablet (40 mg total) by mouth once daily.    carvediloL (COREG) 12.5 MG tablet Take 1 tablet (12.5 mg total) by mouth 2 (two) times daily with meals.    clopidogreL (PLAVIX) 75 mg tablet Take 1 tablet (75 mg total) by mouth once daily.    metFORMIN (GLUCOPHAGE) 500 MG tablet Take 2 tablets (1,000 mg total) by mouth 2 (two) times daily with meals.     No current facility-administered medications on file prior to visit.      Review of patient's allergies indicates:   Allergen Reactions    Pcn [penicillins] Rash     Review of Systems   Constitution: Negative for malaise/fatigue.   Eyes: Negative for blurred vision.   Cardiovascular: Negative for chest pain, claudication, cyanosis, dyspnea on exertion, irregular heartbeat, leg swelling, near-syncope, orthopnea, palpitations and paroxysmal nocturnal dyspnea.   Respiratory: Negative for cough, hemoptysis and shortness of breath.    Hematologic/Lymphatic: Negative for bleeding problem. Does not bruise/bleed easily.   Skin: Negative for dry skin and itching.   Musculoskeletal: Negative for falls, muscle weakness and myalgias.   Gastrointestinal: Negative for abdominal pain, diarrhea, heartburn, hematemesis, hematochezia and melena.   Genitourinary: Negative for flank pain and hematuria.   Neurological: Negative for dizziness, focal weakness, headaches, light-headedness, numbness, paresthesias, seizures and weakness.   Psychiatric/Behavioral: Negative for altered mental status and memory loss. The patient is not nervous/anxious.   "  Allergic/Immunologic: Negative for hives.       Objective:   Physical Exam   Constitutional: He is oriented to person, place, and time. He appears well-developed and well-nourished. No distress.   HENT:   Head: Normocephalic and atraumatic.   Eyes: Pupils are equal, round, and reactive to light. EOM are normal. Right eye exhibits no discharge. Left eye exhibits no discharge.   Neck: Neck supple. No JVD present. No thyromegaly present.   Cardiovascular: Normal rate, regular rhythm, normal heart sounds and intact distal pulses. Exam reveals no gallop and no friction rub.   No murmur heard.  Pulmonary/Chest: Effort normal and breath sounds normal. No respiratory distress. He has no wheezes. He has no rales. He exhibits no tenderness.   Abdominal: Soft. Bowel sounds are normal. He exhibits no distension. There is no abdominal tenderness.   Musculoskeletal: Normal range of motion.         General: No edema.   Neurological: He is alert and oriented to person, place, and time. No cranial nerve deficit.   Skin: Skin is warm and dry. No rash noted. He is not diaphoretic. No erythema.   Psychiatric: He has a normal mood and affect. His behavior is normal.   Nursing note and vitals reviewed.    Vitals:    07/15/20 1102   BP: (!) 128/100   BP Location: Right arm   Patient Position: Sitting   BP Method: Medium (Manual)   Pulse: 80   SpO2: 97%   Weight: 70.9 kg (156 lb 4.9 oz)   Height: 5' 4" (1.626 m)     Lab Results   Component Value Date    CHOL 148 06/25/2020    CHOL 141 05/23/2019    CHOL 196 03/18/2019     Lab Results   Component Value Date    HDL 38 (L) 06/25/2020    HDL 51 05/23/2019    HDL 42 03/18/2019     Lab Results   Component Value Date    LDLCALC 77.4 06/25/2020    LDLCALC 73.2 05/23/2019    LDLCALC 101.8 03/18/2019     Lab Results   Component Value Date    TRIG 163 (H) 06/25/2020    TRIG 84 05/23/2019    TRIG 261 (H) 03/18/2019     Lab Results   Component Value Date    CHOLHDL 25.7 06/25/2020    CHOLHDL 36.2 " 05/23/2019    CHOLHDL 21.4 03/18/2019       Chemistry        Component Value Date/Time     06/25/2020 0725    K 4.9 06/25/2020 0725     06/25/2020 0725    CO2 30 (H) 06/25/2020 0725    BUN 15 06/25/2020 0725    CREATININE 1.1 06/25/2020 0725     (H) 06/25/2020 0725        Component Value Date/Time    CALCIUM 9.8 06/25/2020 0725    ALKPHOS 96 06/25/2020 0725    AST 21 06/25/2020 0725    ALT 63 (H) 06/25/2020 0725    BILITOT 0.5 06/25/2020 0725    ESTGFRAFRICA >60.0 06/25/2020 0725    EGFRNONAA >60.0 06/25/2020 0725        Lab Results   Component Value Date    HGBA1C 12.3 (H) 06/25/2020       Lab Results   Component Value Date    TSH 1.109 10/21/2016     Lab Results   Component Value Date    INR 0.9 03/21/2019    INR 0.9 03/18/2019    INR 1.0 11/11/2015     Lab Results   Component Value Date    WBC 8.90 03/01/2020    HGB 14.4 03/01/2020    HCT 43.4 03/01/2020    MCV 94 03/01/2020     03/01/2020     BMP  Sodium   Date Value Ref Range Status   06/25/2020 139 136 - 145 mmol/L Final     Potassium   Date Value Ref Range Status   06/25/2020 4.9 3.5 - 5.1 mmol/L Final     Chloride   Date Value Ref Range Status   06/25/2020 102 95 - 110 mmol/L Final     CO2   Date Value Ref Range Status   06/25/2020 30 (H) 23 - 29 mmol/L Final     BUN, Bld   Date Value Ref Range Status   06/25/2020 15 6 - 20 mg/dL Final     Creatinine   Date Value Ref Range Status   06/25/2020 1.1 0.5 - 1.4 mg/dL Final     Calcium   Date Value Ref Range Status   06/25/2020 9.8 8.7 - 10.5 mg/dL Final     Anion Gap   Date Value Ref Range Status   06/25/2020 7 (L) 8 - 16 mmol/L Final     eGFR if    Date Value Ref Range Status   06/25/2020 >60.0 >60 mL/min/1.73 m^2 Final     eGFR if non    Date Value Ref Range Status   06/25/2020 >60.0 >60 mL/min/1.73 m^2 Final     Comment:     Calculation used to obtain the estimated glomerular filtration  rate (eGFR) is the CKD-EPI equation.        CrCl cannot be  calculated (Patient's most recent lab result is older than the maximum 7 days allowed.).    Assessment:     1. Coronary artery disease of native artery of native heart with stable angina pectoris    2. Diabetes mellitus without complication    3. History of non-ST elevation myocardial infarction (NSTEMI)    4. S/P coronary artery stent placement    5. Essential hypertension    6. Renal insufficiency      He is non compliant as manifested on a1c triglyceride and diastolic bp. He was counsled about meds intake rf modification. Will add losartan 25 mg po daily.  Plan:   Losartan 25 mg po daily   Needs to see pcp for his diabetes   Continue current therapy  Cardiac low salt diet.  Risk factor modification and excercise program.  F/u in 1 months  With mid level.

## 2020-10-21 ENCOUNTER — TELEPHONE (OUTPATIENT)
Dept: CARDIOLOGY | Facility: CLINIC | Age: 35
End: 2020-10-21

## 2020-10-21 ENCOUNTER — HOSPITAL ENCOUNTER (EMERGENCY)
Facility: HOSPITAL | Age: 35
Discharge: HOME | End: 2020-10-21
Attending: EMERGENCY MEDICINE
Payer: MEDICAID

## 2020-10-21 VITALS
DIASTOLIC BLOOD PRESSURE: 74 MMHG | SYSTOLIC BLOOD PRESSURE: 146 MMHG | RESPIRATION RATE: 18 BRPM | BODY MASS INDEX: 27.91 KG/M2 | HEIGHT: 63 IN | WEIGHT: 157.5 LBS | TEMPERATURE: 98 F | HEART RATE: 90 BPM | OXYGEN SATURATION: 99 %

## 2020-10-21 DIAGNOSIS — S69.91XA RIGHT WRIST INJURY: ICD-10-CM

## 2020-10-21 DIAGNOSIS — M25.531 RIGHT WRIST PAIN: Primary | ICD-10-CM

## 2020-10-21 PROCEDURE — 99284 EMERGENCY DEPT VISIT MOD MDM: CPT | Mod: 25

## 2020-10-21 PROCEDURE — 29125 APPL SHORT ARM SPLINT STATIC: CPT | Mod: RT

## 2020-10-21 RX ORDER — IBUPROFEN 800 MG/1
800 TABLET ORAL EVERY 6 HOURS PRN
Qty: 20 TABLET | Refills: 0 | OUTPATIENT
Start: 2020-10-21 | End: 2021-08-01

## 2020-10-21 NOTE — TELEPHONE ENCOUNTER
----- Message from MIKE Castillo sent at 10/21/2020  2:04 PM CDT -----  Please arrange follow up next week    He was supposed to follow up 1 month after his visit with Dr Moreno which was back in July.    Thanks

## 2020-10-21 NOTE — ED PROVIDER NOTES
Encounter Date: 10/21/2020       History     Chief Complaint   Patient presents with    Wrist Pain     Pt c/o of right wrist pain with certain movements or lifting heavy objects. Denies injury.      The history is provided by the patient.   Wrist Pain  This is a new problem. The current episode started more than 1 week ago. The problem occurs daily. The problem has not changed since onset.Pertinent negatives include no chest pain and no shortness of breath.   R wrist pain when lifting or performing certain duties, denies any known injury    Review of patient's allergies indicates:   Allergen Reactions    Pcn [penicillins] Rash     Past Medical History:   Diagnosis Date    Coronary artery disease involving native coronary artery of native heart     Diabetes mellitus     Essential hypertension 3/29/2019    GERD (gastroesophageal reflux disease)     NSTEMI (non-ST elevated myocardial infarction)     S/P coronary artery stent placement     Undescended testicle before puberty      Past Surgical History:   Procedure Laterality Date    APPENDECTOMY N/A 11/11/2015    Procedure: APPENDECTOMY ;  Surgeon: Osvaldo Guy MD;  Location: [facility] OR;  Service: General;  Laterality: N/A;    CORONARY ANGIOPLASTY      CORONARY STENT PLACEMENT      HERNIA REPAIR      LEFT HEART CATHETERIZATION Left 3/18/2019    Procedure: CATHETERIZATION, HEART, LEFT;  Surgeon: Bill Moreno MD;  Location: [facility] CATH LAB;  Service: Cardiology;  Laterality: Left;     Family History   Problem Relation Age of Onset    Diabetes Mother     Hypertension Father     Heart disease Maternal Grandmother      Social History     Tobacco Use    Smoking status: Never Smoker    Smokeless tobacco: Never Used   Substance Use Topics    Alcohol use: No    Drug use: No     Review of Systems   Constitutional: Negative for fever.   HENT: Negative for sore throat.    Respiratory: Negative for shortness of breath.    Cardiovascular: Negative for chest pain.    Gastrointestinal: Negative for nausea.   Genitourinary: Negative for dysuria.   Musculoskeletal: Negative for back pain.        + R wrist pain   Skin: Negative for rash.   Neurological: Negative for weakness.   Hematological: Does not bruise/bleed easily.   All other systems reviewed and are negative.      Physical Exam     Initial Vitals [10/21/20 1347]   BP Pulse Resp Temp SpO2   (!) 151/81 93 20 98.7 °F (37.1 °C) 98 %      MAP       --         Physical Exam    Constitutional: He appears well-developed and well-nourished. No distress.   HENT:   Head: Normocephalic and atraumatic.   Nose: Nose normal.   Mouth/Throat: Uvula is midline and oropharynx is clear and moist.   Eyes: Conjunctivae and EOM are normal. Pupils are equal, round, and reactive to light.   Neck: Normal range of motion. Neck supple.   Cardiovascular: Normal rate and regular rhythm.   Pulmonary/Chest: Effort normal and breath sounds normal. No respiratory distress. He has no decreased breath sounds. He has no wheezes. He has no rales.   Abdominal: Soft. Normal appearance and bowel sounds are normal. There is no abdominal tenderness.   Musculoskeletal: Normal range of motion.      Right wrist: He exhibits tenderness and bony tenderness. He exhibits normal range of motion and no swelling.      Comments: Right Hand/wrist: No obvious deformity. There is no swelling.  There is mild tenderness to dorsum of R wrist. Full flexion and extension of the wrist. Radial, median, and ulnar nerves are intact. Radial and ulnar pulses are 2+. Normal capillary refill.  Distal sensation is intact.   Neurological: He is alert and oriented to person, place, and time. He has normal strength. GCS eye subscore is 4. GCS verbal subscore is 5. GCS motor subscore is 6.   Skin: Skin is warm and dry. Capillary refill takes less than 2 seconds. No rash noted.   Psychiatric: He has a normal mood and affect. His speech is normal and behavior is normal.         ED Course   Splint  Application    Date/Time: 10/21/2020 2:40 PM  Performed by: LESTER Grossman Jr.  Authorized by: Allison Lux MD   Consent Done: Yes  Consent: Verbal consent obtained.  Consent given by: patient  Location details: right arm  Splint type: volar short arm (velcro wrist splint)  Post-procedure: The splinted body part was neurovascularly unchanged following the procedure.  Patient tolerance: Patient tolerated the procedure well with no immediate complications        Labs Reviewed - No data to display       Imaging Results          X-Ray Wrist Complete Right (Final result)  Result time 10/21/20 14:09:22    Final result by Sang Quiros MD (10/21/20 14:09:22)                 Impression:      Mild widening of the scapholunate distance could represent ligamentous injury.  Old fracture deformity of the 5th metacarpal bone.      Electronically signed by: Sang Quiros MD  Date:    10/21/2020  Time:    14:09             Narrative:    EXAMINATION:  XR WRIST COMPLETE 3 VIEWS RIGHT    CLINICAL HISTORY:  Unspecified injury of right wrist, hand and finger(s), initial encounter    TECHNIQUE:  PA, lateral, and oblique views of the right wrist were performed.    COMPARISON:  None    FINDINGS:  Old fracture deformity of the 5th metacarpal bone.  No acute fracture identified.  No evidence of soft tissue swelling.  Mild widening of the scapholunate distance could represent ligamentous injury.                                                             Clinical Impression:       ICD-10-CM ICD-9-CM   1. Right wrist pain  M25.531 719.43   2. Right wrist injury  S69.91XA 959.3                          ED Disposition Condition    Discharge Stable        ED Prescriptions     Medication Sig Dispense Start Date End Date Auth. Provider    ibuprofen (ADVIL,MOTRIN) 800 MG tablet Take 1 tablet (800 mg total) by mouth every 6 (six) hours as needed for Pain. 20 tablet 10/21/2020  LESTER Grossman Jr.        Follow-up  Information     Follow up With Specialties Details Why Contact Info    [facility] -  Emergency Medicine  As needed [id number] [facility] [id number] [phone]                                       Jimenez Tinsley Jr., FNP  10/21/20 5340

## 2020-10-27 ENCOUNTER — OFFICE VISIT (OUTPATIENT)
Dept: CARDIOLOGY | Facility: CLINIC | Age: 35
End: 2020-10-27
Payer: MEDICAID

## 2020-10-27 VITALS
WEIGHT: 158.94 LBS | BODY MASS INDEX: 28.16 KG/M2 | OXYGEN SATURATION: 97 % | SYSTOLIC BLOOD PRESSURE: 144 MMHG | DIASTOLIC BLOOD PRESSURE: 92 MMHG | HEART RATE: 102 BPM

## 2020-10-27 DIAGNOSIS — E11.9 DIABETES MELLITUS WITHOUT COMPLICATION: ICD-10-CM

## 2020-10-27 DIAGNOSIS — I25.2 HISTORY OF NON-ST ELEVATION MYOCARDIAL INFARCTION (NSTEMI): ICD-10-CM

## 2020-10-27 DIAGNOSIS — I25.118 CORONARY ARTERY DISEASE OF NATIVE ARTERY OF NATIVE HEART WITH STABLE ANGINA PECTORIS: ICD-10-CM

## 2020-10-27 DIAGNOSIS — I10 ESSENTIAL HYPERTENSION: ICD-10-CM

## 2020-10-27 DIAGNOSIS — E11.65 UNCONTROLLED TYPE 2 DIABETES MELLITUS WITH HYPERGLYCEMIA: Primary | ICD-10-CM

## 2020-10-27 DIAGNOSIS — E11.9 TYPE 2 DIABETES MELLITUS WITHOUT COMPLICATION, WITHOUT LONG-TERM CURRENT USE OF INSULIN: ICD-10-CM

## 2020-10-27 PROCEDURE — 99999 PR PBB SHADOW E&M-EST. PATIENT-LVL III: CPT | Mod: PBBFAC,,, | Performed by: NURSE PRACTITIONER

## 2020-10-27 PROCEDURE — 99999 PR PBB SHADOW E&M-EST. PATIENT-LVL III: ICD-10-PCS | Mod: PBBFAC,,, | Performed by: NURSE PRACTITIONER

## 2020-10-27 PROCEDURE — 99214 PR OFFICE/OUTPT VISIT, EST, LEVL IV, 30-39 MIN: ICD-10-PCS | Mod: S$PBB,,, | Performed by: NURSE PRACTITIONER

## 2020-10-27 PROCEDURE — 99214 OFFICE O/P EST MOD 30 MIN: CPT | Mod: S$PBB,,, | Performed by: NURSE PRACTITIONER

## 2020-10-27 PROCEDURE — 99213 OFFICE O/P EST LOW 20 MIN: CPT | Mod: PBBFAC | Performed by: NURSE PRACTITIONER

## 2020-10-27 RX ORDER — METFORMIN HYDROCHLORIDE 500 MG/1
1000 TABLET ORAL 2 TIMES DAILY WITH MEALS
Qty: 120 TABLET | Refills: 11 | Status: SHIPPED | OUTPATIENT
Start: 2020-10-27 | End: 2021-10-07 | Stop reason: SDUPTHER

## 2020-10-27 RX ORDER — CLOPIDOGREL BISULFATE 75 MG/1
75 TABLET ORAL DAILY
Qty: 30 TABLET | Refills: 11 | Status: SHIPPED | OUTPATIENT
Start: 2020-10-27 | End: 2022-01-20

## 2020-10-27 RX ORDER — NAPROXEN SODIUM 220 MG/1
81 TABLET, FILM COATED ORAL DAILY
Qty: 30 TABLET | Refills: 11 | Status: SHIPPED | OUTPATIENT
Start: 2020-10-27 | End: 2024-02-12

## 2020-10-27 RX ORDER — EMPAGLIFLOZIN 10 MG/1
10 TABLET, FILM COATED ORAL DAILY
Qty: 30 TABLET | Refills: 6 | Status: SHIPPED | OUTPATIENT
Start: 2020-10-27 | End: 2021-04-30 | Stop reason: DRUGHIGH

## 2020-10-27 RX ORDER — CARVEDILOL 12.5 MG/1
12.5 TABLET ORAL 2 TIMES DAILY WITH MEALS
Qty: 60 TABLET | Refills: 11 | Status: SHIPPED | OUTPATIENT
Start: 2020-10-27 | End: 2021-11-03

## 2020-10-27 RX ORDER — ATORVASTATIN CALCIUM 40 MG/1
40 TABLET, FILM COATED ORAL DAILY
Qty: 30 TABLET | Refills: 11 | Status: SHIPPED | OUTPATIENT
Start: 2020-10-27 | End: 2021-10-26 | Stop reason: SDUPTHER

## 2020-10-27 RX ORDER — LOSARTAN POTASSIUM 25 MG/1
25 TABLET ORAL DAILY
Qty: 90 TABLET | Refills: 3 | Status: SHIPPED | OUTPATIENT
Start: 2020-10-27 | End: 2022-01-25

## 2020-10-27 NOTE — PROGRESS NOTES
Subjective:   Patient ID:  Peewee Soto Jr. is a 35 y.o. male who presents for evaluation of Coronary Artery Disease, Hypertension, and Risk Factor Management For Atherosclerosis      HPI    Peewee Soto Jr. is a 35 year old male who presents to clinic for BP check. His current medical conditions include CAD s/p PCI of LCX and OM1, HTN, HLP, Uncontrolled DM, Obesity. He returns today and states he is doing ok.     He has right wrist in splint today due to recent sprain. Denies any chest pain or anginal equivalents. BP still elevated today but is improved from last visit.     Denies chest pain or anginal equivalents. No shortness of breath, VARGAS or palpitations. Denies orthopnea, PND or abdominal bloating. Reports regular walking without any issues lately. NO leg swelling or claudications. No recent falls, syncope or near syncopal events. Reports compliance with medications and dietary restrictions. NO CNS complaints to suggest TIA or CVA today. No signs of abnormal bleeding on ASA and Plavix.     Past Medical History:   Diagnosis Date    Coronary artery disease involving native coronary artery of native heart     Diabetes mellitus     Essential hypertension 3/29/2019    GERD (gastroesophageal reflux disease)     NSTEMI (non-ST elevated myocardial infarction)     S/P coronary artery stent placement     Undescended testicle before puberty        Past Surgical History:   Procedure Laterality Date    APPENDECTOMY N/A 11/11/2015    Procedure: APPENDECTOMY ;  Surgeon: Osvaldo Guy MD;  Location: [facility] OR;  Service: General;  Laterality: N/A;    CORONARY ANGIOPLASTY      CORONARY STENT PLACEMENT      HERNIA REPAIR      LEFT HEART CATHETERIZATION Left 3/18/2019    Procedure: CATHETERIZATION, HEART, LEFT;  Surgeon: Bill Moreno MD;  Location: [facility] CATH LAB;  Service: Cardiology;  Laterality: Left;       Social History     Tobacco Use    Smoking status: Never Smoker    Smokeless tobacco:  Never Used   Substance Use Topics    Alcohol use: No    Drug use: No       Family History   Problem Relation Age of Onset    Diabetes Mother     Hypertension Father     Heart disease Maternal Grandmother      Wt Readings from Last 3 Encounters:   10/27/20 72.1 kg (158 lb 15.2 oz)   10/21/20 71.5 kg (157 lb 8.3 oz)   07/15/20 70.9 kg (156 lb 4.9 oz)     Temp Readings from Last 3 Encounters:   10/21/20 98.4 °F (36.9 °C) (Oral)   03/01/20 97.9 °F (36.6 °C) (Oral)   01/19/20 99.9 °F (37.7 °C) (Oral)     BP Readings from Last 3 Encounters:   10/27/20 (!) 144/92   10/21/20 (!) 146/74   07/15/20 (!) 128/100     Pulse Readings from Last 3 Encounters:   10/27/20 102   10/21/20 90   07/15/20 80     Current Outpatient Medications on File Prior to Visit   Medication Sig Dispense Refill    [DISCONTINUED] aspirin 81 MG Chew Take 1 tablet (81 mg total) by mouth once daily.  0    [DISCONTINUED] atorvastatin (LIPITOR) 40 MG tablet Take 1 tablet (40 mg total) by mouth once daily. 90 tablet 1    [DISCONTINUED] carvediloL (COREG) 12.5 MG tablet Take 1 tablet (12.5 mg total) by mouth 2 (two) times daily with meals. 60 tablet 11    [DISCONTINUED] clopidogreL (PLAVIX) 75 mg tablet Take 1 tablet (75 mg total) by mouth once daily. 30 tablet 11    [DISCONTINUED] metFORMIN (GLUCOPHAGE) 500 MG tablet Take 2 tablets (1,000 mg total) by mouth 2 (two) times daily with meals. 120 tablet 0    ibuprofen (ADVIL,MOTRIN) 800 MG tablet Take 1 tablet (800 mg total) by mouth every 6 (six) hours as needed for Pain. (Patient not taking: Reported on 10/27/2020) 20 tablet 0    [DISCONTINUED] losartan (COZAAR) 25 MG tablet Take 1 tablet (25 mg total) by mouth once daily. (Patient not taking: Reported on 10/27/2020) 90 tablet 3     No current facility-administered medications on file prior to visit.        Review of Systems   Constitution: Positive for malaise/fatigue.   HENT: Negative for hearing loss and hoarse voice.    Eyes: Negative for blurred  vision and visual disturbance.   Cardiovascular: Negative for chest pain, claudication, dyspnea on exertion, irregular heartbeat, leg swelling, near-syncope, orthopnea, palpitations, paroxysmal nocturnal dyspnea and syncope.   Respiratory: Negative for cough, hemoptysis, shortness of breath, sleep disturbances due to breathing, snoring and wheezing.    Endocrine: Negative for cold intolerance and heat intolerance.   Hematologic/Lymphatic: Bruises/bleeds easily.   Skin: Negative for color change, dry skin and nail changes.   Musculoskeletal: Positive for arthritis. Negative for back pain, joint pain and myalgias.   Gastrointestinal: Negative for bloating, abdominal pain, constipation, nausea and vomiting.   Genitourinary: Negative for dysuria, flank pain, hematuria and hesitancy.   Neurological: Negative for headaches, light-headedness, loss of balance, numbness, paresthesias and weakness.   Psychiatric/Behavioral: Negative for altered mental status.   Allergic/Immunologic: Negative for environmental allergies.     BP (!) 144/92 (BP Location: Left arm, Patient Position: Sitting)   Pulse 102   Wt 72.1 kg (158 lb 15.2 oz)   SpO2 97%   BMI 28.16 kg/m²     Objective:   Physical Exam   Constitutional: He is oriented to person, place, and time. He appears well-developed and well-nourished. No distress.   HENT:   Head: Normocephalic and atraumatic.   Eyes: Pupils are equal, round, and reactive to light.   Neck: Normal range of motion and full passive range of motion without pain. Neck supple. No JVD present.   Cardiovascular: Normal rate, regular rhythm, S1 normal, S2 normal and intact distal pulses. PMI is not displaced. Exam reveals no distant heart sounds.   No murmur heard.  Pulses:       Radial pulses are 2+ on the right side and 2+ on the left side.        Dorsalis pedis pulses are 2+ on the right side and 2+ on the left side.   CHEST PAIN FREE   Pulmonary/Chest: Effort normal and breath sounds normal. No  accessory muscle usage. No respiratory distress. He has no decreased breath sounds. He has no wheezes. He has no rales.   Abdominal: Soft. Bowel sounds are normal. He exhibits no distension. There is no abdominal tenderness.   Musculoskeletal: Normal range of motion.         General: No edema.      Right ankle: He exhibits no swelling.      Left ankle: He exhibits no swelling.   Neurological: He is alert and oriented to person, place, and time.   Skin: Skin is warm and dry. He is not diaphoretic. No cyanosis. Nails show no clubbing.   Psychiatric: He has a normal mood and affect. His speech is normal and behavior is normal. Judgment and thought content normal. Cognition and memory are normal.   Nursing note and vitals reviewed.      Lab Results   Component Value Date    CHOL 148 06/25/2020    CHOL 141 05/23/2019    CHOL 196 03/18/2019     Lab Results   Component Value Date    HDL 38 (L) 06/25/2020    HDL 51 05/23/2019    HDL 42 03/18/2019     Lab Results   Component Value Date    LDLCALC 77.4 06/25/2020    LDLCALC 73.2 05/23/2019    LDLCALC 101.8 03/18/2019     Lab Results   Component Value Date    TRIG 163 (H) 06/25/2020    TRIG 84 05/23/2019    TRIG 261 (H) 03/18/2019     Lab Results   Component Value Date    CHOLHDL 25.7 06/25/2020    CHOLHDL 36.2 05/23/2019    CHOLHDL 21.4 03/18/2019       Chemistry        Component Value Date/Time     06/25/2020 0725    K 4.9 06/25/2020 0725     06/25/2020 0725    CO2 30 (H) 06/25/2020 0725    BUN 15 06/25/2020 0725    CREATININE 1.1 06/25/2020 0725     (H) 06/25/2020 0725        Component Value Date/Time    CALCIUM 9.8 06/25/2020 0725    ALKPHOS 96 06/25/2020 0725    AST 21 06/25/2020 0725    ALT 63 (H) 06/25/2020 0725    BILITOT 0.5 06/25/2020 0725    ESTGFRAFRICA >60.0 06/25/2020 0725    EGFRNONAA >60.0 06/25/2020 0725          Lab Results   Component Value Date    TSH 1.109 10/21/2016     Lab Results   Component Value Date    INR 0.9 03/21/2019    INR 0.9  03/18/2019    INR 1.0 11/11/2015     Lab Results   Component Value Date    WBC 8.90 03/01/2020    HGB 14.4 03/01/2020    HCT 43.4 03/01/2020    MCV 94 03/01/2020     03/01/2020        Assessment:      1. Uncontrolled type 2 diabetes mellitus with hyperglycemia    2. Diabetes mellitus without complication    3. Essential hypertension    4. Type 2 diabetes mellitus without complication, without long-term current use of insulin    5. History of non-ST elevation myocardial infarction (NSTEMI)    6. Coronary artery disease of native artery of native heart with stable angina pectoris      Patient presents to clinic for BP check and is doing ok today. BP remains elevated today in office. No new cardiac complaints today in office. Needs to get diabetes better controlled. Will adjust his medical regimen and have him follow up in 4 weeks.   Plan:     Resume Losartan 25 mg daily  Add Jardiance 10 mg daily  Continue all other CV meds for now  Continue Metformin 1000 mg BID  Dash diet, 2 gm sodium restriction  Encourage daily walking  Check blood sugar in AM a few days per week and bring to clinic  RTC in 4 weeks with CMP, Lipids, A1C or sooner if needed    Nicole May, FNP-C [facility]

## 2020-10-27 NOTE — PATIENT INSTRUCTIONS
Diabetes and Heart Disease     Take your medicines as directed each day, even if you feel fine.   If you have diabetes, you are two to four times more likely to have heart disease than someone without diabetes. This higher risk is due to diabetes, but it is also due to other risk factors for heart disease that happen in people with diabetes. But theres good news. You can help control your health risks by making some changes in your life. You can take steps to reduce your risk of heart disease by half--similar to the risk in people who don't have diabetes.  Your main risk factors  Three major risk factors for heart disease are high blood sugar, high blood pressure, and high levels of lipids. By keeping risk factors under control, you can help keep your heart and arteries healthy. This may reduce your chances of a heart attack.  · Blood sugar. High blood sugar can make artery walls tough and rough. Plaque (waxy material in the blood) can then build up along the artery walls, making it harder for blood to flow through the arteries. Having high blood sugar increases the chances of having high blood pressure and high cholesterol.  · Blood pressure. When blood pressure is high all the time it causes your heart to work harder to pump blood. Artery walls become damaged. This increases the risk for plaque build up.  · Lipids. The body needs some lipids in the blood to stay healthy. But lipid levels that are too high can damage the artery walls. Lipids include cholesterol and triglycerides. There are two kinds of cholesterol. LDL (bad) cholesterol can damage the arteries. But HDL (good) cholesterol helps clear LDL cholesterol from the blood vessels. This helps keep the arteries healthy. When blood sugar is high, the level of triglycerides in the blood may also be high. High blood triglyceride levels can cause plaque to form.   Other risk factors  Certain lifestyle factors can increase levels of your blood sugar, blood  pressure, and lipids. Such increases raise your risk of heart disease:  · Smoking damages the lining of your arteries. This allows plaque to build up in the artery walls. Smoking also constricts (narrows) the arteries. This can raise blood pressure and cause chest pain or angina. Smoking also increases your risk of getting type 2 diabetes.  · Not being active makes it harder for your heart to do its work. Inactivity is linked to many other risk factors, such as high blood pressure and poor cholesterol levels. Inactivity also increases your risk of getting type 2 diabetes.  · Being overweight makes it harder for your body to use insulin. It also makes your heart work too hard. Being overweight is also the main contributor to the development of type 2 diabetes,   Changes you can make  Following a few simple steps can help keep your risk factors under control. Work with your healthcare team to reach your goals.  · Quitting smoking could save your life. Smoking damages the lining of the blood vessels and raises blood pressure. Smoking also affects how your body uses insulin. This makes it harder to keep blood sugar under control. If you smoke and need help quitting, talk to your healthcare team.   · Testing your blood sugar is the only way to know whether it is under control. Be sure to test your blood sugar yourself. Also get your blood tested in the lab, as directed.  · Monitoring your blood pressure and lipid levels can help you achieve safe levels. Visit your healthcare team as scheduled.  · Taking medicines as directed can help control blood sugar, blood pressure, blood clotting, and/or cholesterol levels.  · Eating right can reduce your risk factors and help you lose weight. Try to limit the amount of processed or refined carbohydrates you eat at one time. Cut back on your total calorie intake. Eat foods low in saturated fat and cholesterol. Eat fiber, including vegetables and whole grains, and cut down on salt. A  dietitian or diabetes educator can help form a meal plan that works for you--even if you are on a low budget.   · Being active can help reduce your weight, strengthen your heart, and lower your lipid levels and blood pressure. Exercise and activity are good for your whole body. Talk to your healthcare team about increasing your activity safely over time.  · Keeping your appointments with your healthcare provider helps you stay healthy. Go in for checkups and lab tests as scheduled.  Date Last Reviewed: 5/19/2016  © 7095-4975 [employer]. [address] [place] [address], [place] [address]. All rights reserved. This information is not intended as a substitute for professional medical care. Always follow your healthcare professional's instructions.

## 2020-11-30 ENCOUNTER — OFFICE VISIT (OUTPATIENT)
Dept: CARDIOLOGY | Facility: CLINIC | Age: 35
End: 2020-11-30
Payer: MEDICAID

## 2020-11-30 ENCOUNTER — LAB VISIT (OUTPATIENT)
Dept: LAB | Facility: HOSPITAL | Age: 35
End: 2020-11-30
Attending: NURSE PRACTITIONER
Payer: MEDICAID

## 2020-11-30 VITALS
WEIGHT: 154.88 LBS | DIASTOLIC BLOOD PRESSURE: 78 MMHG | SYSTOLIC BLOOD PRESSURE: 118 MMHG | OXYGEN SATURATION: 99 % | BODY MASS INDEX: 27.43 KG/M2 | HEART RATE: 84 BPM

## 2020-11-30 DIAGNOSIS — I10 ESSENTIAL HYPERTENSION: ICD-10-CM

## 2020-11-30 DIAGNOSIS — Z95.5 S/P CORONARY ARTERY STENT PLACEMENT: ICD-10-CM

## 2020-11-30 DIAGNOSIS — E11.65 UNCONTROLLED TYPE 2 DIABETES MELLITUS WITH HYPERGLYCEMIA: ICD-10-CM

## 2020-11-30 DIAGNOSIS — E78.2 MIXED HYPERLIPIDEMIA: ICD-10-CM

## 2020-11-30 DIAGNOSIS — I25.118 CORONARY ARTERY DISEASE OF NATIVE ARTERY OF NATIVE HEART WITH STABLE ANGINA PECTORIS: Primary | ICD-10-CM

## 2020-11-30 DIAGNOSIS — I25.118 CORONARY ARTERY DISEASE OF NATIVE ARTERY OF NATIVE HEART WITH STABLE ANGINA PECTORIS: ICD-10-CM

## 2020-11-30 DIAGNOSIS — E11.9 DIABETES MELLITUS WITHOUT COMPLICATION: ICD-10-CM

## 2020-11-30 DIAGNOSIS — I25.2 HISTORY OF NON-ST ELEVATION MYOCARDIAL INFARCTION (NSTEMI): ICD-10-CM

## 2020-11-30 LAB
ALBUMIN SERPL BCP-MCNC: 4.3 G/DL (ref 3.5–5.2)
ALP SERPL-CCNC: 96 U/L (ref 55–135)
ALT SERPL W/O P-5'-P-CCNC: 70 U/L (ref 10–44)
ANION GAP SERPL CALC-SCNC: 9 MMOL/L (ref 8–16)
AST SERPL-CCNC: 20 U/L (ref 10–40)
BILIRUB SERPL-MCNC: 0.3 MG/DL (ref 0.1–1)
BUN SERPL-MCNC: 14 MG/DL (ref 6–20)
CALCIUM SERPL-MCNC: 10 MG/DL (ref 8.7–10.5)
CHLORIDE SERPL-SCNC: 104 MMOL/L (ref 95–110)
CHOLEST SERPL-MCNC: 103 MG/DL (ref 120–199)
CHOLEST/HDLC SERPL: 2.9 {RATIO} (ref 2–5)
CO2 SERPL-SCNC: 28 MMOL/L (ref 23–29)
CREAT SERPL-MCNC: 1.1 MG/DL (ref 0.5–1.4)
EST. GFR  (AFRICAN AMERICAN): >60 ML/MIN/1.73 M^2
EST. GFR  (NON AFRICAN AMERICAN): >60 ML/MIN/1.73 M^2
ESTIMATED AVG GLUCOSE: 229 MG/DL (ref 68–131)
GLUCOSE SERPL-MCNC: 200 MG/DL (ref 70–110)
HBA1C MFR BLD HPLC: 9.6 % (ref 4–5.6)
HDLC SERPL-MCNC: 35 MG/DL (ref 40–75)
HDLC SERPL: 34 % (ref 20–50)
LDLC SERPL CALC-MCNC: 39.2 MG/DL (ref 63–159)
NONHDLC SERPL-MCNC: 68 MG/DL
POTASSIUM SERPL-SCNC: 4.3 MMOL/L (ref 3.5–5.1)
PROT SERPL-MCNC: 7.4 G/DL (ref 6–8.4)
SODIUM SERPL-SCNC: 141 MMOL/L (ref 136–145)
TRIGL SERPL-MCNC: 144 MG/DL (ref 30–150)

## 2020-11-30 PROCEDURE — 36415 COLL VENOUS BLD VENIPUNCTURE: CPT

## 2020-11-30 PROCEDURE — 99214 OFFICE O/P EST MOD 30 MIN: CPT | Mod: S$PBB,,, | Performed by: INTERNAL MEDICINE

## 2020-11-30 PROCEDURE — 80053 COMPREHEN METABOLIC PANEL: CPT

## 2020-11-30 PROCEDURE — 99214 PR OFFICE/OUTPT VISIT, EST, LEVL IV, 30-39 MIN: ICD-10-PCS | Mod: S$PBB,,, | Performed by: INTERNAL MEDICINE

## 2020-11-30 PROCEDURE — 99999 PR PBB SHADOW E&M-EST. PATIENT-LVL III: CPT | Mod: PBBFAC,,, | Performed by: INTERNAL MEDICINE

## 2020-11-30 PROCEDURE — 80061 LIPID PANEL: CPT

## 2020-11-30 PROCEDURE — 83036 HEMOGLOBIN GLYCOSYLATED A1C: CPT

## 2020-11-30 PROCEDURE — 99999 PR PBB SHADOW E&M-EST. PATIENT-LVL III: ICD-10-PCS | Mod: PBBFAC,,, | Performed by: INTERNAL MEDICINE

## 2020-11-30 PROCEDURE — 99213 OFFICE O/P EST LOW 20 MIN: CPT | Mod: PBBFAC | Performed by: INTERNAL MEDICINE

## 2020-11-30 PROCEDURE — 83525 ASSAY OF INSULIN: CPT

## 2020-11-30 NOTE — PROGRESS NOTES
Subjective:   Patient ID:  Peewee Soto Jr. is a 35 y.o. male who presents for follow up of No chief complaint on file.      36 yo male, 4 weeks f/u  PMH CAD h/o NSTEMI s/p PCI in  mild LCX and MO1 MICHELLE x1, normal EF, DM for 4 yrd HTN and HLD.   MPI non ischemia  No chest pain, dyspnea dizziness and palpitation  No leg pain and active bleeding  No smoking/occasional drinking  Lab pending           Past Medical History:   Diagnosis Date    Coronary artery disease involving native coronary artery of native heart     Diabetes mellitus     Essential hypertension 3/29/2019    GERD (gastroesophageal reflux disease)     Mixed hyperlipidemia 11/30/2020    NSTEMI (non-ST elevated myocardial infarction)     S/P coronary artery stent placement     Undescended testicle before puberty        Past Surgical History:   Procedure Laterality Date    APPENDECTOMY N/A 11/11/2015    Procedure: APPENDECTOMY ;  Surgeon: Osvaldo Guy MD;  Location: [facility] OR;  Service: General;  Laterality: N/A;    CORONARY ANGIOPLASTY      CORONARY STENT PLACEMENT      HERNIA REPAIR      LEFT HEART CATHETERIZATION Left 3/18/2019    Procedure: CATHETERIZATION, HEART, LEFT;  Surgeon: Bill Moreno MD;  Location: [facility] CATH LAB;  Service: Cardiology;  Laterality: Left;       Social History     Tobacco Use    Smoking status: Never Smoker    Smokeless tobacco: Never Used   Substance Use Topics    Alcohol use: No    Drug use: No       Family History   Problem Relation Age of Onset    Diabetes Mother     Hypertension Father     Heart disease Maternal Grandmother          Review of Systems   Constitution: Negative for malaise/fatigue.   Eyes: Negative for blurred vision.   Cardiovascular: Negative for chest pain, claudication, cyanosis, dyspnea on exertion, irregular heartbeat, leg swelling, near-syncope, orthopnea, palpitations and paroxysmal nocturnal dyspnea.   Respiratory: Negative for cough, hemoptysis and shortness  of breath.    Hematologic/Lymphatic: Negative for bleeding problem. Does not bruise/bleed easily.   Skin: Negative for dry skin and itching.   Musculoskeletal: Negative for falls, muscle weakness and myalgias.   Gastrointestinal: Negative for abdominal pain, diarrhea, heartburn, hematemesis, hematochezia and melena.   Genitourinary: Negative for flank pain and hematuria.   Neurological: Negative for dizziness, focal weakness, headaches, light-headedness, numbness, paresthesias, seizures and weakness.   Psychiatric/Behavioral: Negative for altered mental status and memory loss. The patient is not nervous/anxious.    Allergic/Immunologic: Negative for hives.       Objective:   Physical Exam   Constitutional: He is oriented to person, place, and time. He appears well-nourished.   HENT:   Head: Normocephalic.   Eyes: Pupils are equal, round, and reactive to light.   Neck: Normal carotid pulses and no JVD present. Carotid bruit is not present. No thyromegaly present.   Cardiovascular: Normal rate, regular rhythm, normal heart sounds and normal pulses.  No extrasystoles are present. PMI is not displaced. Exam reveals no gallop and no S3.   No murmur heard.  Pulmonary/Chest: Breath sounds normal. No stridor. No respiratory distress.   Abdominal: Soft. Bowel sounds are normal. There is no abdominal tenderness. There is no rebound.   Musculoskeletal: Normal range of motion.   Neurological: He is alert and oriented to person, place, and time.   Skin: Skin is intact. No rash noted.   Psychiatric: His behavior is normal.       Lab Results   Component Value Date    CHOL 103 (L) 11/30/2020    CHOL 148 06/25/2020    CHOL 141 05/23/2019     Lab Results   Component Value Date    HDL 35 (L) 11/30/2020    HDL 38 (L) 06/25/2020    HDL 51 05/23/2019     Lab Results   Component Value Date    LDLCALC 39.2 (L) 11/30/2020    LDLCALC 77.4 06/25/2020    LDLCALC 73.2 05/23/2019     Lab Results   Component Value Date    TRIG 144 11/30/2020     TRIG 163 (H) 06/25/2020    TRIG 84 05/23/2019     Lab Results   Component Value Date    CHOLHDL 34.0 11/30/2020    CHOLHDL 25.7 06/25/2020    CHOLHDL 36.2 05/23/2019       Chemistry        Component Value Date/Time     11/30/2020 1030    K 4.3 11/30/2020 1030     11/30/2020 1030    CO2 28 11/30/2020 1030    BUN 14 11/30/2020 1030    CREATININE 1.1 11/30/2020 1030     (H) 11/30/2020 1030        Component Value Date/Time    CALCIUM 10.0 11/30/2020 1030    ALKPHOS 96 11/30/2020 1030    AST 20 11/30/2020 1030    ALT 70 (H) 11/30/2020 1030    BILITOT 0.3 11/30/2020 1030    ESTGFRAFRICA >60 11/30/2020 1030    EGFRNONAA >60 11/30/2020 1030          Lab Results   Component Value Date    HGBA1C 9.6 (H) 11/30/2020     Lab Results   Component Value Date    TSH 1.109 10/21/2016     Lab Results   Component Value Date    INR 0.9 03/21/2019    INR 0.9 03/18/2019    INR 1.0 11/11/2015     Lab Results   Component Value Date    WBC 8.90 03/01/2020    HGB 14.4 03/01/2020    HCT 43.4 03/01/2020    MCV 94 03/01/2020     03/01/2020     BMP  Sodium   Date Value Ref Range Status   11/30/2020 141 136 - 145 mmol/L Final     Potassium   Date Value Ref Range Status   11/30/2020 4.3 3.5 - 5.1 mmol/L Final     Chloride   Date Value Ref Range Status   11/30/2020 104 95 - 110 mmol/L Final     CO2   Date Value Ref Range Status   11/30/2020 28 23 - 29 mmol/L Final     BUN   Date Value Ref Range Status   11/30/2020 14 6 - 20 mg/dL Final     Creatinine   Date Value Ref Range Status   11/30/2020 1.1 0.5 - 1.4 mg/dL Final     Calcium   Date Value Ref Range Status   11/30/2020 10.0 8.7 - 10.5 mg/dL Final     Anion Gap   Date Value Ref Range Status   11/30/2020 9 8 - 16 mmol/L Final     eGFR if    Date Value Ref Range Status   11/30/2020 >60 >60 mL/min/1.73 m^2 Final     eGFR if non    Date Value Ref Range Status   11/30/2020 >60 >60 mL/min/1.73 m^2 Final     Comment:     Calculation used to obtain  the estimated glomerular filtration  rate (eGFR) is the CKD-EPI equation.        BNP  @LABRCNTIP(BNP,BNPTRIAGEBLO)@  @LABRCNTIP(troponini)@  Estimated Creatinine Clearance: 82.6 mL/min (based on SCr of 1.1 mg/dL).  No results found in the last 24 hours.  No results found in the last 24 hours.  No results found in the last 24 hours.    Assessment:      1. Coronary artery disease of native artery of native heart with stable angina pectoris    2. S/P coronary artery stent placement    3. History of non-ST elevation myocardial infarction (NSTEMI)    4. Essential hypertension    5. Diabetes mellitus without complication    6. Mixed hyperlipidemia      BP and LDL controlled  A1C 9.6    Plan:   Refer to Endo for DM Rx  Continue ASA Plavix BB losartan and Statin  Counseled DASH  Check Lipid profile in 6 months  Recommend heart-healthy diet, weight control and regular exercise.  Jessica. Risk modification.   I have reviewed all pertinent labs and cardiac studies independently. Plans and recommendations have been formulated under my direct supervision. All questions answered and patient voiced understanding.   If symptoms persist go to the ED  RTC in 3 months with EVA

## 2020-12-01 LAB
INSULIN COLLECTION INTERVAL: NORMAL
INSULIN SERPL-ACNC: 10.3 UU/ML

## 2020-12-08 ENCOUNTER — TELEPHONE (OUTPATIENT)
Dept: CARDIOLOGY | Facility: CLINIC | Age: 35
End: 2020-12-08

## 2020-12-08 NOTE — TELEPHONE ENCOUNTER
Medications: Spoke with patient to advise him that lipids well controlled, A1C is slowly improving with new medications.   Normal kidney function and potassium.   Keep next f/u as scheduled.  Denies questions/concerns.

## 2021-02-22 ENCOUNTER — PATIENT MESSAGE (OUTPATIENT)
Dept: ENDOCRINOLOGY | Facility: CLINIC | Age: 36
End: 2021-02-22

## 2021-04-28 ENCOUNTER — OFFICE VISIT (OUTPATIENT)
Dept: CARDIOLOGY | Facility: CLINIC | Age: 36
End: 2021-04-28
Payer: MEDICAID

## 2021-04-28 VITALS
HEART RATE: 106 BPM | WEIGHT: 154.56 LBS | DIASTOLIC BLOOD PRESSURE: 76 MMHG | SYSTOLIC BLOOD PRESSURE: 102 MMHG | OXYGEN SATURATION: 95 % | BODY MASS INDEX: 27.38 KG/M2

## 2021-04-28 DIAGNOSIS — I25.118 CORONARY ARTERY DISEASE OF NATIVE ARTERY OF NATIVE HEART WITH STABLE ANGINA PECTORIS: Primary | ICD-10-CM

## 2021-04-28 DIAGNOSIS — I10 ESSENTIAL HYPERTENSION: ICD-10-CM

## 2021-04-28 DIAGNOSIS — E78.2 MIXED HYPERLIPIDEMIA: ICD-10-CM

## 2021-04-28 DIAGNOSIS — I25.2 HISTORY OF NON-ST ELEVATION MYOCARDIAL INFARCTION (NSTEMI): ICD-10-CM

## 2021-04-28 DIAGNOSIS — Z95.5 S/P CORONARY ARTERY STENT PLACEMENT: ICD-10-CM

## 2021-04-28 DIAGNOSIS — E11.9 DIABETES MELLITUS WITHOUT COMPLICATION: ICD-10-CM

## 2021-04-28 PROCEDURE — 99999 PR PBB SHADOW E&M-EST. PATIENT-LVL III: CPT | Mod: PBBFAC,,, | Performed by: NURSE PRACTITIONER

## 2021-04-28 PROCEDURE — 99213 OFFICE O/P EST LOW 20 MIN: CPT | Mod: PBBFAC | Performed by: NURSE PRACTITIONER

## 2021-04-28 PROCEDURE — 99214 PR OFFICE/OUTPT VISIT, EST, LEVL IV, 30-39 MIN: ICD-10-PCS | Mod: S$PBB,,, | Performed by: NURSE PRACTITIONER

## 2021-04-28 PROCEDURE — 99999 PR PBB SHADOW E&M-EST. PATIENT-LVL III: ICD-10-PCS | Mod: PBBFAC,,, | Performed by: NURSE PRACTITIONER

## 2021-04-28 PROCEDURE — 99214 OFFICE O/P EST MOD 30 MIN: CPT | Mod: S$PBB,,, | Performed by: NURSE PRACTITIONER

## 2021-04-29 ENCOUNTER — LAB VISIT (OUTPATIENT)
Dept: LAB | Facility: HOSPITAL | Age: 36
End: 2021-04-29
Attending: NURSE PRACTITIONER
Payer: MEDICAID

## 2021-04-29 DIAGNOSIS — E11.9 DIABETES MELLITUS WITHOUT COMPLICATION: ICD-10-CM

## 2021-04-29 DIAGNOSIS — I25.118 CORONARY ARTERY DISEASE OF NATIVE ARTERY OF NATIVE HEART WITH STABLE ANGINA PECTORIS: ICD-10-CM

## 2021-04-29 DIAGNOSIS — E11.65 UNCONTROLLED TYPE 2 DIABETES MELLITUS WITH HYPERGLYCEMIA: ICD-10-CM

## 2021-04-29 DIAGNOSIS — I25.2 HISTORY OF NON-ST ELEVATION MYOCARDIAL INFARCTION (NSTEMI): ICD-10-CM

## 2021-04-29 LAB
ANION GAP SERPL CALC-SCNC: 8 MMOL/L (ref 8–16)
BUN SERPL-MCNC: 12 MG/DL (ref 6–20)
CALCIUM SERPL-MCNC: 9.5 MG/DL (ref 8.7–10.5)
CHLORIDE SERPL-SCNC: 103 MMOL/L (ref 95–110)
CO2 SERPL-SCNC: 29 MMOL/L (ref 23–29)
CREAT SERPL-MCNC: 0.9 MG/DL (ref 0.5–1.4)
EST. GFR  (AFRICAN AMERICAN): >60 ML/MIN/1.73 M^2
EST. GFR  (NON AFRICAN AMERICAN): >60 ML/MIN/1.73 M^2
ESTIMATED AVG GLUCOSE: 200 MG/DL (ref 68–131)
GLUCOSE SERPL-MCNC: 125 MG/DL (ref 70–110)
HBA1C MFR BLD: 8.6 % (ref 4–5.6)
POTASSIUM SERPL-SCNC: 4.4 MMOL/L (ref 3.5–5.1)
SODIUM SERPL-SCNC: 140 MMOL/L (ref 136–145)

## 2021-04-29 PROCEDURE — 83036 HEMOGLOBIN GLYCOSYLATED A1C: CPT | Performed by: NURSE PRACTITIONER

## 2021-04-29 PROCEDURE — 36415 COLL VENOUS BLD VENIPUNCTURE: CPT | Performed by: NURSE PRACTITIONER

## 2021-04-29 PROCEDURE — 80048 BASIC METABOLIC PNL TOTAL CA: CPT | Performed by: NURSE PRACTITIONER

## 2021-04-30 ENCOUNTER — TELEPHONE (OUTPATIENT)
Dept: CARDIOLOGY | Facility: CLINIC | Age: 36
End: 2021-04-30

## 2021-04-30 RX ORDER — EMPAGLIFLOZIN 25 MG/1
25 TABLET, FILM COATED ORAL DAILY
Qty: 30 TABLET | Refills: 11 | Status: SHIPPED | OUTPATIENT
Start: 2021-04-30 | End: 2022-03-07

## 2021-05-06 ENCOUNTER — PATIENT MESSAGE (OUTPATIENT)
Dept: RESEARCH | Facility: HOSPITAL | Age: 36
End: 2021-05-06

## 2021-06-10 ENCOUNTER — DOCUMENTATION ONLY (OUTPATIENT)
Dept: CARDIOLOGY | Facility: CLINIC | Age: 36
End: 2021-06-10

## 2021-06-22 ENCOUNTER — TELEPHONE (OUTPATIENT)
Dept: CARDIOLOGY | Facility: CLINIC | Age: 36
End: 2021-06-22

## 2021-07-28 ENCOUNTER — HOSPITAL ENCOUNTER (EMERGENCY)
Facility: HOSPITAL | Age: 36
Discharge: HOME | End: 2021-07-28
Attending: EMERGENCY MEDICINE
Payer: MEDICAID

## 2021-07-28 VITALS
BODY MASS INDEX: 27.23 KG/M2 | TEMPERATURE: 99 F | SYSTOLIC BLOOD PRESSURE: 128 MMHG | HEIGHT: 63 IN | WEIGHT: 153.69 LBS | RESPIRATION RATE: 18 BRPM | OXYGEN SATURATION: 95 % | HEART RATE: 73 BPM | DIASTOLIC BLOOD PRESSURE: 85 MMHG

## 2021-07-28 DIAGNOSIS — Z20.822 CLOSE EXPOSURE TO COVID-19 VIRUS: Primary | ICD-10-CM

## 2021-07-28 DIAGNOSIS — Z20.822 LAB TEST NEGATIVE FOR COVID-19 VIRUS: ICD-10-CM

## 2021-07-28 LAB — SARS-COV-2 RDRP RESP QL NAA+PROBE: NEGATIVE

## 2021-07-28 PROCEDURE — U0002 COVID-19 LAB TEST NON-CDC: HCPCS | Performed by: EMERGENCY MEDICINE

## 2021-07-28 PROCEDURE — 99282 EMERGENCY DEPT VISIT SF MDM: CPT

## 2021-08-01 ENCOUNTER — HOSPITAL ENCOUNTER (EMERGENCY)
Facility: HOSPITAL | Age: 36
Discharge: HOME | End: 2021-08-01
Attending: EMERGENCY MEDICINE
Payer: MEDICAID

## 2021-08-01 VITALS
HEIGHT: 63 IN | SYSTOLIC BLOOD PRESSURE: 148 MMHG | DIASTOLIC BLOOD PRESSURE: 86 MMHG | HEART RATE: 86 BPM | BODY MASS INDEX: 27.11 KG/M2 | TEMPERATURE: 98 F | RESPIRATION RATE: 18 BRPM | OXYGEN SATURATION: 96 % | WEIGHT: 153 LBS

## 2021-08-01 DIAGNOSIS — U07.1 COVID-19: Primary | ICD-10-CM

## 2021-08-01 DIAGNOSIS — U07.1 COVID-19 VIRUS DETECTED: ICD-10-CM

## 2021-08-01 LAB
CTP QC/QA: YES
SARS-COV-2 RDRP RESP QL NAA+PROBE: POSITIVE

## 2021-08-01 PROCEDURE — 99283 EMERGENCY DEPT VISIT LOW MDM: CPT | Mod: 25

## 2021-08-01 PROCEDURE — U0002 COVID-19 LAB TEST NON-CDC: HCPCS | Performed by: NURSE PRACTITIONER

## 2021-08-01 RX ORDER — CODEINE PHOSPHATE AND GUAIFENESIN 10; 100 MG/5ML; MG/5ML
10 SOLUTION ORAL EVERY 6 HOURS PRN
Qty: 120 ML | Refills: 0 | Status: SHIPPED | OUTPATIENT
Start: 2021-08-01 | End: 2021-08-11

## 2021-10-07 DIAGNOSIS — E11.9 TYPE 2 DIABETES MELLITUS WITHOUT COMPLICATION, WITHOUT LONG-TERM CURRENT USE OF INSULIN: ICD-10-CM

## 2021-10-07 RX ORDER — METFORMIN HYDROCHLORIDE 500 MG/1
TABLET ORAL
Qty: 360 TABLET | Refills: 0 | Status: SHIPPED | OUTPATIENT
Start: 2021-10-07 | End: 2022-01-14

## 2021-10-08 ENCOUNTER — TELEPHONE (OUTPATIENT)
Dept: CARDIOLOGY | Facility: CLINIC | Age: 36
End: 2021-10-08

## 2021-10-11 ENCOUNTER — HOSPITAL ENCOUNTER (OUTPATIENT)
Dept: CARDIOLOGY | Facility: HOSPITAL | Age: 36
Discharge: HOME | End: 2021-10-11
Attending: INTERNAL MEDICINE
Payer: MEDICAID

## 2021-10-11 ENCOUNTER — OFFICE VISIT (OUTPATIENT)
Dept: CARDIOLOGY | Facility: CLINIC | Age: 36
End: 2021-10-11
Payer: MEDICAID

## 2021-10-11 VITALS
SYSTOLIC BLOOD PRESSURE: 136 MMHG | OXYGEN SATURATION: 98 % | HEART RATE: 78 BPM | WEIGHT: 154.31 LBS | DIASTOLIC BLOOD PRESSURE: 88 MMHG | BODY MASS INDEX: 27.34 KG/M2

## 2021-10-11 DIAGNOSIS — E11.9 DIABETES MELLITUS WITHOUT COMPLICATION: ICD-10-CM

## 2021-10-11 DIAGNOSIS — I25.118 CORONARY ARTERY DISEASE OF NATIVE ARTERY OF NATIVE HEART WITH STABLE ANGINA PECTORIS: ICD-10-CM

## 2021-10-11 DIAGNOSIS — E78.2 MIXED HYPERLIPIDEMIA: ICD-10-CM

## 2021-10-11 DIAGNOSIS — Z95.5 S/P CORONARY ARTERY STENT PLACEMENT: ICD-10-CM

## 2021-10-11 DIAGNOSIS — I10 ESSENTIAL HYPERTENSION: ICD-10-CM

## 2021-10-11 DIAGNOSIS — I10 ESSENTIAL HYPERTENSION: Primary | ICD-10-CM

## 2021-10-11 DIAGNOSIS — I25.2 HISTORY OF NON-ST ELEVATION MYOCARDIAL INFARCTION (NSTEMI): Primary | ICD-10-CM

## 2021-10-11 PROCEDURE — 99999 PR PBB SHADOW E&M-EST. PATIENT-LVL III: ICD-10-PCS | Mod: PBBFAC,,, | Performed by: INTERNAL MEDICINE

## 2021-10-11 PROCEDURE — 93010 ELECTROCARDIOGRAM REPORT: CPT | Mod: ,,, | Performed by: INTERNAL MEDICINE

## 2021-10-11 PROCEDURE — 99213 OFFICE O/P EST LOW 20 MIN: CPT | Mod: PBBFAC | Performed by: INTERNAL MEDICINE

## 2021-10-11 PROCEDURE — 93005 ELECTROCARDIOGRAM TRACING: CPT

## 2021-10-11 PROCEDURE — 99214 OFFICE O/P EST MOD 30 MIN: CPT | Mod: S$PBB,,, | Performed by: INTERNAL MEDICINE

## 2021-10-11 PROCEDURE — 93010 EKG 12-LEAD: ICD-10-PCS | Mod: ,,, | Performed by: INTERNAL MEDICINE

## 2021-10-11 PROCEDURE — 99999 PR PBB SHADOW E&M-EST. PATIENT-LVL III: CPT | Mod: PBBFAC,,, | Performed by: INTERNAL MEDICINE

## 2021-10-11 PROCEDURE — 99214 PR OFFICE/OUTPT VISIT, EST, LEVL IV, 30-39 MIN: ICD-10-PCS | Mod: S$PBB,,, | Performed by: INTERNAL MEDICINE

## 2021-10-12 ENCOUNTER — TELEPHONE (OUTPATIENT)
Dept: CARDIOLOGY | Facility: CLINIC | Age: 36
End: 2021-10-12

## 2021-11-03 ENCOUNTER — OFFICE VISIT (OUTPATIENT)
Dept: CARDIOLOGY | Facility: CLINIC | Age: 36
End: 2021-11-03
Payer: MEDICAID

## 2021-11-03 VITALS
BODY MASS INDEX: 27.49 KG/M2 | SYSTOLIC BLOOD PRESSURE: 122 MMHG | WEIGHT: 155.19 LBS | HEART RATE: 103 BPM | DIASTOLIC BLOOD PRESSURE: 70 MMHG | OXYGEN SATURATION: 95 %

## 2021-11-03 DIAGNOSIS — Z95.5 S/P CORONARY ARTERY STENT PLACEMENT: ICD-10-CM

## 2021-11-03 DIAGNOSIS — I10 ESSENTIAL HYPERTENSION: ICD-10-CM

## 2021-11-03 DIAGNOSIS — R00.0 SINUS TACHYCARDIA: ICD-10-CM

## 2021-11-03 DIAGNOSIS — E78.2 MIXED HYPERLIPIDEMIA: ICD-10-CM

## 2021-11-03 DIAGNOSIS — I25.118 CORONARY ARTERY DISEASE OF NATIVE ARTERY OF NATIVE HEART WITH STABLE ANGINA PECTORIS: ICD-10-CM

## 2021-11-03 DIAGNOSIS — I25.2 HISTORY OF NON-ST ELEVATION MYOCARDIAL INFARCTION (NSTEMI): Primary | ICD-10-CM

## 2021-11-03 PROCEDURE — 99999 PR PBB SHADOW E&M-EST. PATIENT-LVL III: CPT | Mod: PBBFAC,,, | Performed by: INTERNAL MEDICINE

## 2021-11-03 PROCEDURE — 99999 PR PBB SHADOW E&M-EST. PATIENT-LVL III: ICD-10-PCS | Mod: PBBFAC,,, | Performed by: INTERNAL MEDICINE

## 2021-11-03 PROCEDURE — 99214 PR OFFICE/OUTPT VISIT, EST, LEVL IV, 30-39 MIN: ICD-10-PCS | Mod: S$PBB,,, | Performed by: INTERNAL MEDICINE

## 2021-11-03 PROCEDURE — 99214 OFFICE O/P EST MOD 30 MIN: CPT | Mod: S$PBB,,, | Performed by: INTERNAL MEDICINE

## 2021-11-03 PROCEDURE — 99213 OFFICE O/P EST LOW 20 MIN: CPT | Mod: PBBFAC,PO | Performed by: INTERNAL MEDICINE

## 2021-11-03 RX ORDER — METOPROLOL SUCCINATE 100 MG/1
100 TABLET, EXTENDED RELEASE ORAL DAILY
Qty: 30 TABLET | Refills: 5 | Status: SHIPPED | OUTPATIENT
Start: 2021-11-03 | End: 2022-05-09

## 2022-03-08 NOTE — PROGRESS NOTES
Subjective:   Patient ID:  Peewee Soto Jr. is a 36 y.o. male who presents for evaluation of No chief complaint on file.      HPI    Peewee Soto Jr. is a 35 year old male who presents to clinic for BP check. His current medical conditions include CAD s/p PCI of LCX and OM1, HTN, HLP, Uncontrolled DM, Obesity. He returns today and states he is doing ok.     He has right wrist in splint today due to recent sprain. Denies any chest pain or anginal equivalents. BP still elevated today but is improved from last visit.     Denies chest pain or anginal equivalents. No shortness of breath, VARGAS or palpitations. Denies orthopnea, PND or abdominal bloating. Reports regular walking without any issues lately. NO leg swelling or claudications. No recent falls, syncope or near syncopal events. Reports compliance with medications and dietary restrictions. NO CNS complaints to suggest TIA or CVA today. No signs of abnormal bleeding on ASA and Plavix.     4/28/2021 update:    He returns to cardiology clinic today for 3 month follow up. He reports some episodes of chest soreness at the end of the day. BP is well controlled today in office. Reports compliance with medications. He does endorse noncompliance with dietary restrictions. He is eating more frozen meals lately since he is living by himself.     NO chest pain or anginal equivalents today in office. No shortness of breath, VARGAS or palpitations. Denies any formal exercise program. He is working at [employer]. NO leg swelling or claudications. NO signs of abnormal bleeding on ASA and Plavix.     3/10/2022 update    He returns today and states he is doing well cardiac wise.   Denies any chest pain or anginal equivalents. No shortness of breath, VARGAS or palpitations.   Has not been compliant with diabetic diet recently.   Denies orthopnea, PND or abdominal bloating.   NO leg swelling or claudications.     CMP, Lipids, BNP, A1C pending.     Reports  compliance with meds and dietary restrictions.     Past Medical History:   Diagnosis Date    Coronary artery disease involving native coronary artery of native heart     Diabetes mellitus     Essential hypertension 3/29/2019    GERD (gastroesophageal reflux disease)     Mixed hyperlipidemia 11/30/2020    NSTEMI (non-ST elevated myocardial infarction)     S/P coronary artery stent placement     Undescended testicle before puberty        Past Surgical History:   Procedure Laterality Date    APPENDECTOMY N/A 11/11/2015    Procedure: APPENDECTOMY ;  Surgeon: Osvaldo Guy MD;  Location: [facility] OR;  Service: General;  Laterality: N/A;    CORONARY ANGIOPLASTY      CORONARY STENT PLACEMENT      HERNIA REPAIR      LEFT HEART CATHETERIZATION Left 3/18/2019    Procedure: CATHETERIZATION, HEART, LEFT;  Surgeon: Bill Moreno MD;  Location: [facility] CATH LAB;  Service: Cardiology;  Laterality: Left;       Social History     Tobacco Use    Smoking status: Never Smoker    Smokeless tobacco: Never Used   Substance Use Topics    Alcohol use: No    Drug use: No       Family History   Problem Relation Age of Onset    Diabetes Mother     Hypertension Father     Heart disease Maternal Grandmother      Wt Readings from Last 3 Encounters:   03/10/22 71.3 kg (157 lb 3 oz)   11/03/21 70.4 kg (155 lb 3.3 oz)   10/11/21 70 kg (154 lb 5.2 oz)     Temp Readings from Last 3 Encounters:   08/01/21 98.3 °F (36.8 °C)   07/28/21 99 °F (37.2 °C) (Oral)   10/21/20 98.4 °F (36.9 °C) (Oral)     BP Readings from Last 3 Encounters:   03/10/22 120/80   11/03/21 122/70   10/11/21 136/88     Pulse Readings from Last 3 Encounters:   03/10/22 70   11/03/21 103   10/11/21 78     Current Outpatient Medications on File Prior to Visit   Medication Sig Dispense Refill    atorvastatin (LIPITOR) 40 MG tablet Take 1 tablet by mouth once daily 90 tablet 0    clopidogreL (PLAVIX) 75 mg tablet Take 1 tablet by mouth once daily 90 tablet 0    losartan  "(COZAAR) 25 MG tablet Take 1 tablet by mouth once daily 90 tablet 0    metFORMIN (GLUCOPHAGE) 500 MG tablet TAKE 2 TABLETS BY MOUTH TWICE DAILY WITH MEALS 360 tablet 0    metoprolol succinate (TOPROL-XL) 100 MG 24 hr tablet Take 1 tablet (100 mg total) by mouth once daily. 30 tablet 5    [DISCONTINUED] JARDIANCE 25 mg tablet Take 1 tablet by mouth once daily 90 tablet 0    aspirin 81 MG Chew Take 1 tablet (81 mg total) by mouth once daily. 30 tablet 11     No current facility-administered medications on file prior to visit.       Review of Systems   Constitutional: Positive for malaise/fatigue.   HENT: Negative for hearing loss and hoarse voice.    Eyes: Negative for blurred vision and visual disturbance.   Cardiovascular: Negative for chest pain, claudication, dyspnea on exertion, irregular heartbeat, leg swelling, near-syncope, orthopnea, palpitations, paroxysmal nocturnal dyspnea and syncope.   Respiratory: Negative for cough, hemoptysis, shortness of breath, sleep disturbances due to breathing, snoring and wheezing.    Endocrine: Negative for cold intolerance and heat intolerance.   Hematologic/Lymphatic: Bruises/bleeds easily.   Skin: Negative for color change, dry skin and nail changes.   Musculoskeletal: Positive for arthritis. Negative for back pain, joint pain and myalgias.   Gastrointestinal: Negative for bloating, abdominal pain, constipation, nausea and vomiting.   Genitourinary: Negative for dysuria, flank pain, hematuria and hesitancy.   Neurological: Negative for headaches, light-headedness, loss of balance, numbness, paresthesias and weakness.   Psychiatric/Behavioral: Negative for altered mental status.   Allergic/Immunologic: Negative for environmental allergies.     /80 (BP Location: Right arm, Patient Position: Sitting, BP Method: Medium (Manual))   Pulse 70   Ht 5' 3" (1.6 m)   Wt 71.3 kg (157 lb 3 oz)   SpO2 97%   BMI 27.84 kg/m²     Objective:   Physical Exam  Vitals and nursing " note reviewed.   Constitutional:       General: He is not in acute distress.     Appearance: He is well-developed. He is not diaphoretic.   HENT:      Head: Normocephalic and atraumatic.   Eyes:      Pupils: Pupils are equal, round, and reactive to light.   Neck:      Vascular: No JVD.   Cardiovascular:      Rate and Rhythm: Normal rate and regular rhythm.      Chest Wall: PMI is not displaced.      Pulses: Intact distal pulses.           Radial pulses are 2+ on the right side and 2+ on the left side.        Dorsalis pedis pulses are 2+ on the right side and 2+ on the left side.      Heart sounds: S1 normal and S2 normal. Heart sounds not distant. No murmur heard.     Comments: CHEST PAIN FREE  Pulmonary:      Effort: Pulmonary effort is normal. No accessory muscle usage or respiratory distress.      Breath sounds: Normal breath sounds. No decreased breath sounds, wheezing or rales.   Abdominal:      General: Bowel sounds are normal. There is no distension.      Palpations: Abdomen is soft.      Tenderness: There is no abdominal tenderness.   Musculoskeletal:         General: Normal range of motion.      Cervical back: Full passive range of motion without pain, normal range of motion and neck supple.      Right ankle: No swelling.      Left ankle: No swelling.   Skin:     General: Skin is warm and dry.      Nails: There is no clubbing.   Neurological:      Mental Status: He is alert and oriented to person, place, and time.   Psychiatric:         Speech: Speech normal.         Behavior: Behavior normal.         Thought Content: Thought content normal.         Judgment: Judgment normal.         Lab Results   Component Value Date    CHOL 112 (L) 10/11/2021    CHOL 103 (L) 11/30/2020    CHOL 148 06/25/2020     Lab Results   Component Value Date    HDL 42 10/11/2021    HDL 35 (L) 11/30/2020    HDL 38 (L) 06/25/2020     Lab Results   Component Value Date    LDLCALC 50.6 (L) 10/11/2021    LDLCALC 39.2 (L) 11/30/2020     LDLCALC 77.4 06/25/2020     Lab Results   Component Value Date    TRIG 97 10/11/2021    TRIG 144 11/30/2020    TRIG 163 (H) 06/25/2020     Lab Results   Component Value Date    CHOLHDL 37.5 10/11/2021    CHOLHDL 34.0 11/30/2020    CHOLHDL 25.7 06/25/2020       Chemistry        Component Value Date/Time     10/11/2021 0913    K 4.2 10/11/2021 0913     10/11/2021 0913    CO2 24 10/11/2021 0913    BUN 11 10/11/2021 0913    CREATININE 0.8 10/11/2021 0913     (H) 10/11/2021 0913        Component Value Date/Time    CALCIUM 10.2 10/11/2021 0913    ALKPHOS 96 11/30/2020 1030    AST 20 11/30/2020 1030    ALT 70 (H) 11/30/2020 1030    BILITOT 0.3 11/30/2020 1030    ESTGFRAFRICA >60.0 10/11/2021 0913    EGFRNONAA >60.0 10/11/2021 0913          Lab Results   Component Value Date    TSH 1.109 10/21/2016     Lab Results   Component Value Date    INR 0.9 03/21/2019    INR 0.9 03/18/2019    INR 1.0 11/11/2015     Lab Results   Component Value Date    WBC 8.90 03/01/2020    HGB 14.4 03/01/2020    HCT 43.4 03/01/2020    MCV 94 03/01/2020     03/01/2020        Assessment:      1. Diabetes mellitus without complication    2. Coronary artery disease of native artery of native heart with stable angina pectoris    3. History of non-ST elevation myocardial infarction (NSTEMI)    4. Essential hypertension    5. Mixed hyperlipidemia    6. S/P coronary artery stent placement        Plan:     Check CMP, Lipids, BNP, A1C today  Call with results  Continue same meds  Monitor BP/HR at home  Encourage regular physical activity  RTC in 6 months with Dr Jose Manuel Werner, [facility]

## 2022-03-10 ENCOUNTER — OFFICE VISIT (OUTPATIENT)
Dept: CARDIOLOGY | Facility: CLINIC | Age: 37
End: 2022-03-10
Payer: MEDICAID

## 2022-03-10 ENCOUNTER — LAB VISIT (OUTPATIENT)
Dept: LAB | Facility: HOSPITAL | Age: 37
End: 2022-03-10
Attending: NURSE PRACTITIONER
Payer: MEDICAID

## 2022-03-10 VITALS
HEIGHT: 63 IN | WEIGHT: 157.19 LBS | HEART RATE: 70 BPM | BODY MASS INDEX: 27.85 KG/M2 | OXYGEN SATURATION: 97 % | DIASTOLIC BLOOD PRESSURE: 80 MMHG | SYSTOLIC BLOOD PRESSURE: 120 MMHG

## 2022-03-10 DIAGNOSIS — I25.118 CORONARY ARTERY DISEASE OF NATIVE ARTERY OF NATIVE HEART WITH STABLE ANGINA PECTORIS: ICD-10-CM

## 2022-03-10 DIAGNOSIS — Z95.5 S/P CORONARY ARTERY STENT PLACEMENT: ICD-10-CM

## 2022-03-10 DIAGNOSIS — E78.2 MIXED HYPERLIPIDEMIA: ICD-10-CM

## 2022-03-10 DIAGNOSIS — I25.2 HISTORY OF NON-ST ELEVATION MYOCARDIAL INFARCTION (NSTEMI): ICD-10-CM

## 2022-03-10 DIAGNOSIS — I10 ESSENTIAL HYPERTENSION: ICD-10-CM

## 2022-03-10 DIAGNOSIS — E11.9 DIABETES MELLITUS WITHOUT COMPLICATION: ICD-10-CM

## 2022-03-10 DIAGNOSIS — E11.9 DIABETES MELLITUS WITHOUT COMPLICATION: Primary | ICD-10-CM

## 2022-03-10 LAB
ALBUMIN SERPL BCP-MCNC: 4.2 G/DL (ref 3.5–5.2)
ALP SERPL-CCNC: 93 U/L (ref 55–135)
ALT SERPL W/O P-5'-P-CCNC: 90 U/L (ref 10–44)
ANION GAP SERPL CALC-SCNC: 12 MMOL/L (ref 8–16)
AST SERPL-CCNC: 42 U/L (ref 10–40)
BILIRUB SERPL-MCNC: 0.5 MG/DL (ref 0.1–1)
BNP SERPL-MCNC: 20 PG/ML (ref 0–99)
BUN SERPL-MCNC: 10 MG/DL (ref 6–20)
CALCIUM SERPL-MCNC: 11.6 MG/DL (ref 8.7–10.5)
CHLORIDE SERPL-SCNC: 101 MMOL/L (ref 95–110)
CHOLEST SERPL-MCNC: 110 MG/DL (ref 120–199)
CHOLEST/HDLC SERPL: 2.6 {RATIO} (ref 2–5)
CO2 SERPL-SCNC: 30 MMOL/L (ref 23–29)
CREAT SERPL-MCNC: 1 MG/DL (ref 0.5–1.4)
EST. GFR  (AFRICAN AMERICAN): >60 ML/MIN/1.73 M^2
EST. GFR  (NON AFRICAN AMERICAN): >60 ML/MIN/1.73 M^2
ESTIMATED AVG GLUCOSE: 203 MG/DL (ref 68–131)
GLUCOSE SERPL-MCNC: 132 MG/DL (ref 70–110)
HBA1C MFR BLD: 8.7 % (ref 4–5.6)
HDLC SERPL-MCNC: 42 MG/DL (ref 40–75)
HDLC SERPL: 38.2 % (ref 20–50)
LDLC SERPL CALC-MCNC: 40.8 MG/DL (ref 63–159)
NONHDLC SERPL-MCNC: 68 MG/DL
POTASSIUM SERPL-SCNC: 4.6 MMOL/L (ref 3.5–5.1)
PROT SERPL-MCNC: 7.3 G/DL (ref 6–8.4)
SODIUM SERPL-SCNC: 143 MMOL/L (ref 136–145)
TRIGL SERPL-MCNC: 136 MG/DL (ref 30–150)

## 2022-03-10 PROCEDURE — 3051F HG A1C>EQUAL 7.0%<8.0%: CPT | Mod: CPTII,,, | Performed by: NURSE PRACTITIONER

## 2022-03-10 PROCEDURE — 99214 OFFICE O/P EST MOD 30 MIN: CPT | Mod: S$PBB,,, | Performed by: NURSE PRACTITIONER

## 2022-03-10 PROCEDURE — 80061 LIPID PANEL: CPT | Performed by: NURSE PRACTITIONER

## 2022-03-10 PROCEDURE — 83036 HEMOGLOBIN GLYCOSYLATED A1C: CPT | Performed by: NURSE PRACTITIONER

## 2022-03-10 PROCEDURE — 1159F PR MEDICATION LIST DOCUMENTED IN MEDICAL RECORD: ICD-10-PCS | Mod: CPTII,,, | Performed by: NURSE PRACTITIONER

## 2022-03-10 PROCEDURE — 3051F PR MOST RECENT HEMOGLOBIN A1C LEVEL 7.0 - < 8.0%: ICD-10-PCS | Mod: CPTII,,, | Performed by: NURSE PRACTITIONER

## 2022-03-10 PROCEDURE — 99214 PR OFFICE/OUTPT VISIT, EST, LEVL IV, 30-39 MIN: ICD-10-PCS | Mod: S$PBB,,, | Performed by: NURSE PRACTITIONER

## 2022-03-10 PROCEDURE — 3074F PR MOST RECENT SYSTOLIC BLOOD PRESSURE < 130 MM HG: ICD-10-PCS | Mod: CPTII,,, | Performed by: NURSE PRACTITIONER

## 2022-03-10 PROCEDURE — 4010F ACE/ARB THERAPY RXD/TAKEN: CPT | Mod: CPTII,,, | Performed by: NURSE PRACTITIONER

## 2022-03-10 PROCEDURE — 83880 ASSAY OF NATRIURETIC PEPTIDE: CPT | Performed by: NURSE PRACTITIONER

## 2022-03-10 PROCEDURE — 99213 OFFICE O/P EST LOW 20 MIN: CPT | Mod: PBBFAC | Performed by: NURSE PRACTITIONER

## 2022-03-10 PROCEDURE — 1159F MED LIST DOCD IN RCRD: CPT | Mod: CPTII,,, | Performed by: NURSE PRACTITIONER

## 2022-03-10 PROCEDURE — 36415 COLL VENOUS BLD VENIPUNCTURE: CPT | Performed by: NURSE PRACTITIONER

## 2022-03-10 PROCEDURE — 99999 PR PBB SHADOW E&M-EST. PATIENT-LVL III: CPT | Mod: PBBFAC,,, | Performed by: NURSE PRACTITIONER

## 2022-03-10 PROCEDURE — 99999 PR PBB SHADOW E&M-EST. PATIENT-LVL III: ICD-10-PCS | Mod: PBBFAC,,, | Performed by: NURSE PRACTITIONER

## 2022-03-10 PROCEDURE — 4010F PR ACE/ARB THEARPY RXD/TAKEN: ICD-10-PCS | Mod: CPTII,,, | Performed by: NURSE PRACTITIONER

## 2022-03-10 PROCEDURE — 3008F BODY MASS INDEX DOCD: CPT | Mod: CPTII,,, | Performed by: NURSE PRACTITIONER

## 2022-03-10 PROCEDURE — 3074F SYST BP LT 130 MM HG: CPT | Mod: CPTII,,, | Performed by: NURSE PRACTITIONER

## 2022-03-10 PROCEDURE — 80053 COMPREHEN METABOLIC PANEL: CPT | Performed by: NURSE PRACTITIONER

## 2022-03-10 PROCEDURE — 3079F PR MOST RECENT DIASTOLIC BLOOD PRESSURE 80-89 MM HG: ICD-10-PCS | Mod: CPTII,,, | Performed by: NURSE PRACTITIONER

## 2022-03-10 PROCEDURE — 3079F DIAST BP 80-89 MM HG: CPT | Mod: CPTII,,, | Performed by: NURSE PRACTITIONER

## 2022-03-10 PROCEDURE — 3008F PR BODY MASS INDEX (BMI) DOCUMENTED: ICD-10-PCS | Mod: CPTII,,, | Performed by: NURSE PRACTITIONER

## 2022-03-10 RX ORDER — EMPAGLIFLOZIN 25 MG/1
25 TABLET, FILM COATED ORAL DAILY
Qty: 90 TABLET | Refills: 3 | Status: SHIPPED | OUTPATIENT
Start: 2022-03-10 | End: 2023-04-26 | Stop reason: SDUPTHER

## 2022-04-11 ENCOUNTER — PATIENT MESSAGE (OUTPATIENT)
Dept: RESEARCH | Facility: HOSPITAL | Age: 37
End: 2022-04-11
Payer: MEDICAID

## 2022-06-13 NOTE — NURSING TRANSFER
Nursing Transfer Note      3/18/2019     Transfer To: [id number]    Transfer via bed    Transfer with cardiac monitoring    Transported by [employer]    Medicines sent: none    Chart send with patient: Yes    Notified: family in room    Patient reassessed at: TRANSFERRED TO ROOM. TRANSFERRED TO BED.  TELEMETRY MONITER ON.  IV FLUIDS ON PUMP AT PRESCRIBED RATE.  PROCEDURAL ACCESS SITE WITHOUT BLEEDING OR HEMATOMA.  DRESSING DRY AND INTACT.  CARE HANDED OFF TOmiranda [id number]......... (date, time)    Upon arrival to floor: cardiac monitor applied, patient oriented to room, call bell in reach and bed in lowest position   ED / Discharge Outreach Protocol    Patient Contact    Attempt # 1    Was call answered?  No.  Left message on voicemail with information to call me back.    Teresa Lakhani R.N.

## 2022-09-08 ENCOUNTER — TELEPHONE (OUTPATIENT)
Dept: CARDIOLOGY | Facility: CLINIC | Age: 37
End: 2022-09-08
Payer: MEDICAID

## 2022-09-08 DIAGNOSIS — I25.2 HISTORY OF NON-ST ELEVATION MYOCARDIAL INFARCTION (NSTEMI): Primary | ICD-10-CM

## 2022-09-20 ENCOUNTER — HOSPITAL ENCOUNTER (OUTPATIENT)
Dept: CARDIOLOGY | Facility: HOSPITAL | Age: 37
Discharge: HOME | End: 2022-09-20
Payer: MEDICAID

## 2022-09-20 ENCOUNTER — OFFICE VISIT (OUTPATIENT)
Dept: CARDIOLOGY | Facility: CLINIC | Age: 37
End: 2022-09-20
Payer: MEDICAID

## 2022-09-20 VITALS
SYSTOLIC BLOOD PRESSURE: 110 MMHG | BODY MASS INDEX: 26.25 KG/M2 | DIASTOLIC BLOOD PRESSURE: 80 MMHG | WEIGHT: 148.13 LBS | HEART RATE: 71 BPM | HEIGHT: 63 IN

## 2022-09-20 DIAGNOSIS — Z95.5 S/P CORONARY ARTERY STENT PLACEMENT: ICD-10-CM

## 2022-09-20 DIAGNOSIS — I25.2 HISTORY OF NON-ST ELEVATION MYOCARDIAL INFARCTION (NSTEMI): ICD-10-CM

## 2022-09-20 DIAGNOSIS — E78.2 MIXED HYPERLIPIDEMIA: ICD-10-CM

## 2022-09-20 DIAGNOSIS — I25.118 CORONARY ARTERY DISEASE OF NATIVE ARTERY OF NATIVE HEART WITH STABLE ANGINA PECTORIS: ICD-10-CM

## 2022-09-20 DIAGNOSIS — I10 ESSENTIAL HYPERTENSION: ICD-10-CM

## 2022-09-20 DIAGNOSIS — E11.9 DIABETES MELLITUS WITHOUT COMPLICATION: Primary | ICD-10-CM

## 2022-09-20 PROCEDURE — 3074F PR MOST RECENT SYSTOLIC BLOOD PRESSURE < 130 MM HG: ICD-10-PCS | Mod: CPTII,,, | Performed by: NURSE PRACTITIONER

## 2022-09-20 PROCEDURE — 3052F PR MOST RECENT HEMOGLOBIN A1C LEVEL 8.0 - < 9.0%: ICD-10-PCS | Mod: CPTII,,, | Performed by: NURSE PRACTITIONER

## 2022-09-20 PROCEDURE — 93005 ELECTROCARDIOGRAM TRACING: CPT

## 2022-09-20 PROCEDURE — 3074F SYST BP LT 130 MM HG: CPT | Mod: CPTII,,, | Performed by: NURSE PRACTITIONER

## 2022-09-20 PROCEDURE — 3079F DIAST BP 80-89 MM HG: CPT | Mod: CPTII,,, | Performed by: NURSE PRACTITIONER

## 2022-09-20 PROCEDURE — 3008F BODY MASS INDEX DOCD: CPT | Mod: CPTII,,, | Performed by: NURSE PRACTITIONER

## 2022-09-20 PROCEDURE — 3079F PR MOST RECENT DIASTOLIC BLOOD PRESSURE 80-89 MM HG: ICD-10-PCS | Mod: CPTII,,, | Performed by: NURSE PRACTITIONER

## 2022-09-20 PROCEDURE — 3052F HG A1C>EQUAL 8.0%<EQUAL 9.0%: CPT | Mod: CPTII,,, | Performed by: NURSE PRACTITIONER

## 2022-09-20 PROCEDURE — 93010 EKG 12-LEAD: ICD-10-PCS | Mod: ,,, | Performed by: INTERNAL MEDICINE

## 2022-09-20 PROCEDURE — 1159F MED LIST DOCD IN RCRD: CPT | Mod: CPTII,,, | Performed by: NURSE PRACTITIONER

## 2022-09-20 PROCEDURE — 99214 OFFICE O/P EST MOD 30 MIN: CPT | Mod: S$PBB,,, | Performed by: NURSE PRACTITIONER

## 2022-09-20 PROCEDURE — 1159F PR MEDICATION LIST DOCUMENTED IN MEDICAL RECORD: ICD-10-PCS | Mod: CPTII,,, | Performed by: NURSE PRACTITIONER

## 2022-09-20 PROCEDURE — 99213 OFFICE O/P EST LOW 20 MIN: CPT | Mod: PBBFAC | Performed by: NURSE PRACTITIONER

## 2022-09-20 PROCEDURE — 99999 PR PBB SHADOW E&M-EST. PATIENT-LVL III: CPT | Mod: PBBFAC,,, | Performed by: NURSE PRACTITIONER

## 2022-09-20 PROCEDURE — 93010 ELECTROCARDIOGRAM REPORT: CPT | Mod: ,,, | Performed by: INTERNAL MEDICINE

## 2022-09-20 PROCEDURE — 4010F ACE/ARB THERAPY RXD/TAKEN: CPT | Mod: CPTII,,, | Performed by: NURSE PRACTITIONER

## 2022-09-20 PROCEDURE — 99999 PR PBB SHADOW E&M-EST. PATIENT-LVL III: ICD-10-PCS | Mod: PBBFAC,,, | Performed by: NURSE PRACTITIONER

## 2022-09-20 PROCEDURE — 3008F PR BODY MASS INDEX (BMI) DOCUMENTED: ICD-10-PCS | Mod: CPTII,,, | Performed by: NURSE PRACTITIONER

## 2022-09-20 PROCEDURE — 4010F PR ACE/ARB THEARPY RXD/TAKEN: ICD-10-PCS | Mod: CPTII,,, | Performed by: NURSE PRACTITIONER

## 2022-09-20 PROCEDURE — 99214 PR OFFICE/OUTPT VISIT, EST, LEVL IV, 30-39 MIN: ICD-10-PCS | Mod: S$PBB,,, | Performed by: NURSE PRACTITIONER

## 2022-09-20 NOTE — PROGRESS NOTES
Subjective:   Patient ID:  Peewee Soto Jr. is a 37 y.o. male who presents for evaluation of No chief complaint on file.      HPI    Peewee Soto Jr. is a 35 year old male who presents to clinic for BP check. His current medical conditions include CAD s/p PCI of LCX and OM1, HTN, HLP, Uncontrolled DM, Obesity. He returns today and states he is doing ok.     He has right wrist in splint today due to recent sprain. Denies any chest pain or anginal equivalents. BP still elevated today but is improved from last visit.     Denies chest pain or anginal equivalents. No shortness of breath, VARGAS or palpitations. Denies orthopnea, PND or abdominal bloating. Reports regular walking without any issues lately. NO leg swelling or claudications. No recent falls, syncope or near syncopal events. Reports compliance with medications and dietary restrictions. NO CNS complaints to suggest TIA or CVA today. No signs of abnormal bleeding on ASA and Plavix.     4/28/2021 update:    He returns to cardiology clinic today for 3 month follow up. He reports some episodes of chest soreness at the end of the day. BP is well controlled today in office. Reports compliance with medications. He does endorse noncompliance with dietary restrictions. He is eating more frozen meals lately since he is living by himself.     NO chest pain or anginal equivalents today in office. No shortness of breath, VARGAS or palpitations. Denies any formal exercise program. He is working at [employer]. NO leg swelling or claudications. NO signs of abnormal bleeding on ASA and Plavix.     3/10/2022 update    He returns today and states he is doing well cardiac wise.   Denies any chest pain or anginal equivalents. No shortness of breath, VARGAS or palpitations.   Has not been compliant with diabetic diet recently.   Denies orthopnea, PND or abdominal bloating.   NO leg swelling or claudications.     CMP, Lipids, BNP, A1C pending.     Reports  compliance with meds and dietary restrictions.     9/20/2022 update    Peewee Soto Jr. Returns for follow up.     He does endorse noncompliance with dietary regimen. He is not monitoring his blood sugars.     Denies chest pain or anginal equivalents. No shortness of breath, VARGAS or palpitations. Denies orthopnea, PND or abdominal bloating. Reports regular walking without any issues lately. NO leg swelling or claudications. No recent falls, syncope or near syncopal events. Reports compliance with medications and dietary restrictions. NO CNS complaints to suggest TIA or CVA today. No signs of abnormal bleeding on ASA and Plavix.       Past Medical History:   Diagnosis Date    Coronary artery disease involving native coronary artery of native heart     Diabetes mellitus     Essential hypertension 3/29/2019    GERD (gastroesophageal reflux disease)     Mixed hyperlipidemia 11/30/2020    NSTEMI (non-ST elevated myocardial infarction)     S/P coronary artery stent placement     Undescended testicle before puberty        Past Surgical History:   Procedure Laterality Date    APPENDECTOMY N/A 11/11/2015    Procedure: APPENDECTOMY ;  Surgeon: Osvaldo Guy MD;  Location: [facility] OR;  Service: General;  Laterality: N/A;    CORONARY ANGIOPLASTY      CORONARY STENT PLACEMENT      HERNIA REPAIR      LEFT HEART CATHETERIZATION Left 3/18/2019    Procedure: CATHETERIZATION, HEART, LEFT;  Surgeon: Bill Moreno MD;  Location: [facility] CATH LAB;  Service: Cardiology;  Laterality: Left;       Social History     Tobacco Use    Smoking status: Never    Smokeless tobacco: Never   Substance Use Topics    Alcohol use: No    Drug use: No       Family History   Problem Relation Age of Onset    Diabetes Mother     Hypertension Father     Heart disease Maternal Grandmother      Wt Readings from Last 3 Encounters:   09/20/22 67.2 kg (148 lb 2.4 oz)   03/10/22 71.3 kg (157 lb 3 oz)   11/03/21 70.4 kg (155 lb 3.3 oz)     Temp Readings from  Last 3 Encounters:   08/01/21 98.3 °F (36.8 °C)   07/28/21 99 °F (37.2 °C) (Oral)   10/21/20 98.4 °F (36.9 °C) (Oral)     BP Readings from Last 3 Encounters:   09/20/22 110/80   03/10/22 120/80   11/03/21 122/70     Pulse Readings from Last 3 Encounters:   09/20/22 71   03/10/22 70   11/03/21 103     Current Outpatient Medications on File Prior to Visit   Medication Sig Dispense Refill    aspirin 81 MG Chew Take 1 tablet (81 mg total) by mouth once daily. 30 tablet 11    atorvastatin (LIPITOR) 40 MG tablet Take 1 tablet by mouth once daily 90 tablet 0    clopidogreL (PLAVIX) 75 mg tablet Take 1 tablet by mouth once daily 90 tablet 0    empagliflozin (JARDIANCE) 25 mg tablet Take 1 tablet (25 mg total) by mouth once daily. 90 tablet 3    losartan (COZAAR) 25 MG tablet Take 1 tablet by mouth once daily 90 tablet 0    metFORMIN (GLUCOPHAGE) 500 MG tablet TAKE 2 TABLETS BY MOUTH TWICE DAILY WITH MEALS 360 tablet 0    metoprolol succinate (TOPROL-XL) 100 MG 24 hr tablet Take 1 tablet by mouth once daily 90 tablet 2     No current facility-administered medications on file prior to visit.       Review of Systems   Constitutional: Positive for malaise/fatigue.   HENT:  Negative for hearing loss and hoarse voice.    Eyes:  Negative for blurred vision and visual disturbance.   Cardiovascular:  Negative for chest pain, claudication, dyspnea on exertion, irregular heartbeat, leg swelling, near-syncope, orthopnea, palpitations, paroxysmal nocturnal dyspnea and syncope.   Respiratory:  Negative for cough, hemoptysis, shortness of breath, sleep disturbances due to breathing, snoring and wheezing.    Endocrine: Negative for cold intolerance and heat intolerance.   Hematologic/Lymphatic: Bruises/bleeds easily.   Skin:  Negative for color change, dry skin and nail changes.   Musculoskeletal:  Positive for arthritis. Negative for back pain, joint pain and myalgias.   Gastrointestinal:  Negative for bloating, abdominal pain,  "constipation, nausea and vomiting.   Genitourinary:  Negative for dysuria, flank pain, hematuria and hesitancy.   Neurological:  Negative for headaches, light-headedness, loss of balance, numbness, paresthesias and weakness.   Psychiatric/Behavioral:  Negative for altered mental status.    Allergic/Immunologic: Negative for environmental allergies.   /80   Pulse 71 Comment: via ekg today  Ht 5' 3" (1.6 m)   Wt 67.2 kg (148 lb 2.4 oz)   BMI 26.24 kg/m²     Objective:   Physical Exam  Vitals and nursing note reviewed.   Constitutional:       General: He is not in acute distress.     Appearance: He is well-developed. He is not diaphoretic.   HENT:      Head: Normocephalic and atraumatic.   Eyes:      Pupils: Pupils are equal, round, and reactive to light.   Neck:      Vascular: No JVD.   Cardiovascular:      Rate and Rhythm: Normal rate and regular rhythm.      Chest Wall: PMI is not displaced.      Pulses: Intact distal pulses.           Radial pulses are 2+ on the right side and 2+ on the left side.        Dorsalis pedis pulses are 2+ on the right side and 2+ on the left side.      Heart sounds: S1 normal and S2 normal. Heart sounds not distant. No murmur heard.     Comments: CHEST PAIN FREE  Pulmonary:      Effort: Pulmonary effort is normal. No accessory muscle usage or respiratory distress.      Breath sounds: Normal breath sounds. No decreased breath sounds, wheezing or rales.   Abdominal:      General: Bowel sounds are normal. There is no distension.      Palpations: Abdomen is soft.      Tenderness: There is no abdominal tenderness.   Musculoskeletal:         General: Normal range of motion.      Cervical back: Full passive range of motion without pain, normal range of motion and neck supple.      Right ankle: No swelling.      Left ankle: No swelling.   Skin:     General: Skin is warm and dry.      Nails: There is no clubbing.   Neurological:      Mental Status: He is alert and oriented to person, " place, and time.   Psychiatric:         Speech: Speech normal.         Behavior: Behavior normal.         Thought Content: Thought content normal.         Judgment: Judgment normal.       Lab Results   Component Value Date    CHOL 110 (L) 03/10/2022    CHOL 112 (L) 10/11/2021    CHOL 103 (L) 11/30/2020     Lab Results   Component Value Date    HDL 42 03/10/2022    HDL 42 10/11/2021    HDL 35 (L) 11/30/2020     Lab Results   Component Value Date    LDLCALC 40.8 (L) 03/10/2022    LDLCALC 50.6 (L) 10/11/2021    LDLCALC 39.2 (L) 11/30/2020     Lab Results   Component Value Date    TRIG 136 03/10/2022    TRIG 97 10/11/2021    TRIG 144 11/30/2020     Lab Results   Component Value Date    CHOLHDL 38.2 03/10/2022    CHOLHDL 37.5 10/11/2021    CHOLHDL 34.0 11/30/2020       Chemistry        Component Value Date/Time     03/10/2022 0942    K 4.6 03/10/2022 0942     03/10/2022 0942    CO2 30 (H) 03/10/2022 0942    BUN 10 03/10/2022 0942    CREATININE 1.0 03/10/2022 0942     (H) 03/10/2022 0942        Component Value Date/Time    CALCIUM 11.6 (H) 03/10/2022 0942    ALKPHOS 93 03/10/2022 0942    AST 42 (H) 03/10/2022 0942    ALT 90 (H) 03/10/2022 0942    BILITOT 0.5 03/10/2022 0942    ESTGFRAFRICA >60 03/10/2022 0942    EGFRNONAA >60 03/10/2022 0942          Lab Results   Component Value Date    TSH 1.109 10/21/2016     Lab Results   Component Value Date    INR 0.9 03/21/2019    INR 0.9 03/18/2019    INR 1.0 11/11/2015     Lab Results   Component Value Date    WBC 8.90 03/01/2020    HGB 14.4 03/01/2020    HCT 43.4 03/01/2020    MCV 94 03/01/2020     03/01/2020        Assessment:      1. Diabetes mellitus without complication    2. Coronary artery disease of native artery of native heart with stable angina pectoris    3. History of non-ST elevation myocardial infarction (NSTEMI)    4. S/P coronary artery stent placement    5. Essential hypertension    6. Mixed hyperlipidemia          Plan:     Referral to  Diabetes    Continue same meds for now  Dash diet 2 gm sodium restriction  Encourage daily walking  Profile BP at home  RTC In 6 months or sooner if needed.     SHERRI FosterP-C  [facility]

## 2023-03-13 ENCOUNTER — HOSPITAL ENCOUNTER (EMERGENCY)
Facility: HOSPITAL | Age: 38
Discharge: HOME | End: 2023-03-13
Attending: EMERGENCY MEDICINE
Payer: MEDICAID

## 2023-03-13 VITALS
WEIGHT: 150.25 LBS | TEMPERATURE: 98 F | BODY MASS INDEX: 26.62 KG/M2 | HEIGHT: 63 IN | HEART RATE: 91 BPM | SYSTOLIC BLOOD PRESSURE: 134 MMHG | OXYGEN SATURATION: 96 % | RESPIRATION RATE: 20 BRPM | DIASTOLIC BLOOD PRESSURE: 99 MMHG

## 2023-03-13 DIAGNOSIS — Z76.0 ENCOUNTER FOR MEDICATION REFILL: Primary | ICD-10-CM

## 2023-03-13 DIAGNOSIS — E11.9 TYPE 2 DIABETES MELLITUS WITHOUT COMPLICATION, WITHOUT LONG-TERM CURRENT USE OF INSULIN: ICD-10-CM

## 2023-03-13 LAB — POCT GLUCOSE: 239 MG/DL (ref 70–110)

## 2023-03-13 PROCEDURE — 82962 GLUCOSE BLOOD TEST: CPT

## 2023-03-13 PROCEDURE — 99282 EMERGENCY DEPT VISIT SF MDM: CPT

## 2023-03-13 RX ORDER — ATORVASTATIN CALCIUM 40 MG/1
40 TABLET, FILM COATED ORAL DAILY
Qty: 30 TABLET | Refills: 3 | Status: SHIPPED | OUTPATIENT
Start: 2023-03-13 | End: 2023-12-02 | Stop reason: SDUPTHER

## 2023-03-13 RX ORDER — METOPROLOL SUCCINATE 100 MG/1
100 TABLET, EXTENDED RELEASE ORAL DAILY
Qty: 30 TABLET | Refills: 3 | Status: SHIPPED | OUTPATIENT
Start: 2023-03-13 | End: 2024-02-12

## 2023-03-13 RX ORDER — CLOPIDOGREL BISULFATE 75 MG/1
75 TABLET ORAL DAILY
Qty: 30 TABLET | Refills: 3 | Status: SHIPPED | OUTPATIENT
Start: 2023-03-13 | End: 2023-12-02 | Stop reason: SDUPTHER

## 2023-03-13 RX ORDER — LOSARTAN POTASSIUM 25 MG/1
25 TABLET ORAL DAILY
Qty: 30 TABLET | Refills: 3 | Status: SHIPPED | OUTPATIENT
Start: 2023-03-13 | End: 2023-12-02 | Stop reason: SDUPTHER

## 2023-03-13 RX ORDER — METFORMIN HYDROCHLORIDE 500 MG/1
500 TABLET ORAL 2 TIMES DAILY WITH MEALS
Qty: 30 TABLET | Refills: 3 | Status: SHIPPED | OUTPATIENT
Start: 2023-03-13 | End: 2023-04-26

## 2023-03-13 NOTE — ED PROVIDER NOTES
Encounter Date: 3/13/2023       History     Chief Complaint   Patient presents with    Medication Refill     Out of meds x 2 months, unable to get refills due to financial problems. Metformin 500 mg, atorvastatin 40 mg, metoprolol  mg, losartan 25 mg, clopidogrel 75 mg.     37-year-old male here for refill of medications.  Patient denies any complaints.      Review of patient's allergies indicates:   Allergen Reactions    Pcn [penicillins] Rash     Past Medical History:   Diagnosis Date    Coronary artery disease involving native coronary artery of native heart     Diabetes mellitus     Essential hypertension 3/29/2019    GERD (gastroesophageal reflux disease)     Mixed hyperlipidemia 11/30/2020    NSTEMI (non-ST elevated myocardial infarction)     S/P coronary artery stent placement     Undescended testicle before puberty      Past Surgical History:   Procedure Laterality Date    APPENDECTOMY N/A 11/11/2015    Procedure: APPENDECTOMY ;  Surgeon: Osvaldo Guy MD;  Location: [facility] OR;  Service: General;  Laterality: N/A;    CORONARY ANGIOPLASTY      CORONARY STENT PLACEMENT      HERNIA REPAIR      LEFT HEART CATHETERIZATION Left 3/18/2019    Procedure: CATHETERIZATION, HEART, LEFT;  Surgeon: Bill Moreno MD;  Location: [facility] CATH LAB;  Service: Cardiology;  Laterality: Left;     Family History   Problem Relation Age of Onset    Diabetes Mother     Hypertension Father     Heart disease Maternal Grandmother      Social History     Tobacco Use    Smoking status: Never    Smokeless tobacco: Never   Substance Use Topics    Alcohol use: No    Drug use: No     Review of Systems   Constitutional:  Negative for fever.   HENT:  Negative for sore throat.    Respiratory:  Negative for shortness of breath.    Cardiovascular:  Negative for chest pain.   Gastrointestinal:  Negative for nausea.   Genitourinary:  Negative for dysuria.   Musculoskeletal:  Negative for back pain.   Skin:  Negative for rash.   Neurological:   Negative for weakness.   Hematological:  Does not bruise/bleed easily.     Physical Exam     Initial Vitals [03/13/23 0904]   BP Pulse Resp Temp SpO2   (!) 134/99 91 20 98.2 °F (36.8 °C) 96 %      MAP       --         Physical Exam    Nursing note and vitals reviewed.  Constitutional: He appears well-developed and well-nourished.   HENT:   Head: Normocephalic and atraumatic.   Eyes: Conjunctivae are normal. Pupils are equal, round, and reactive to light.   Neck: Neck supple.   Normal range of motion.  Cardiovascular:  Normal rate, regular rhythm, normal heart sounds and intact distal pulses.           Pulmonary/Chest: Breath sounds normal.   Abdominal: Abdomen is soft. There is no rebound and no guarding.   Musculoskeletal:         General: Normal range of motion.      Cervical back: Normal range of motion and neck supple.     Neurological: He is alert.   Skin: Skin is warm and dry.   Psychiatric: He has a normal mood and affect. His behavior is normal. Thought content normal.       ED Course   Procedures  Labs Reviewed   POCT GLUCOSE - Abnormal; Notable for the following components:       Result Value    POCT Glucose 239 (*)     All other components within normal limits   HIV 1 / 2 ANTIBODY   HEPATITIS C ANTIBODY   HEP C VIRUS HOLD SPECIMEN          Imaging Results    None          Medications - No data to display                           Clinical Impression:   Final diagnoses:  [Z76.0] Encounter for medication refill (Primary)        ED Disposition Condition    Discharge Stable          ED Prescriptions       Medication Sig Dispense Start Date End Date Auth. Provider    metFORMIN (GLUCOPHAGE) 500 MG tablet Take 1 tablet (500 mg total) by mouth 2 (two) times daily with meals. 30 tablet 3/13/2023 4/12/2023 Bola Loco NP    clopidogreL (PLAVIX) 75 mg tablet Take 1 tablet (75 mg total) by mouth once daily. 30 tablet 3/13/2023 4/12/2023 Bola Loco NP    metoprolol succinate (TOPROL-XL) 100 MG 24 hr tablet  Take 1 tablet (100 mg total) by mouth once daily. 30 tablet 3/13/2023 4/12/2023 Bola Loco NP    atorvastatin (LIPITOR) 40 MG tablet Take 1 tablet (40 mg total) by mouth once daily. 30 tablet 3/13/2023 4/12/2023 Bola Loco NP    losartan (COZAAR) 25 MG tablet Take 1 tablet (25 mg total) by mouth once daily. 30 tablet 3/13/2023 4/12/2023 Bola Loco NP          Follow-up Information       Follow up With Specialties Details Why Contact Info    PCP  Schedule an appointment as soon as possible for a visit  As needed              Bola Loco NP  03/13/23 0913

## 2023-04-06 ENCOUNTER — OFFICE VISIT (OUTPATIENT)
Dept: CARDIOLOGY | Facility: CLINIC | Age: 38
End: 2023-04-06
Payer: MEDICAID

## 2023-04-06 VITALS
BODY MASS INDEX: 27.11 KG/M2 | OXYGEN SATURATION: 96 % | WEIGHT: 153 LBS | SYSTOLIC BLOOD PRESSURE: 128 MMHG | HEART RATE: 71 BPM | RESPIRATION RATE: 16 BRPM | DIASTOLIC BLOOD PRESSURE: 78 MMHG | HEIGHT: 63 IN

## 2023-04-06 DIAGNOSIS — E78.2 MIXED HYPERLIPIDEMIA: ICD-10-CM

## 2023-04-06 DIAGNOSIS — I10 ESSENTIAL HYPERTENSION: ICD-10-CM

## 2023-04-06 DIAGNOSIS — Z95.5 S/P CORONARY ARTERY STENT PLACEMENT: ICD-10-CM

## 2023-04-06 DIAGNOSIS — E11.9 DIABETES MELLITUS WITHOUT COMPLICATION: ICD-10-CM

## 2023-04-06 DIAGNOSIS — I25.118 CORONARY ARTERY DISEASE OF NATIVE ARTERY OF NATIVE HEART WITH STABLE ANGINA PECTORIS: Primary | ICD-10-CM

## 2023-04-06 PROCEDURE — 3078F DIAST BP <80 MM HG: CPT | Mod: CPTII,,, | Performed by: NURSE PRACTITIONER

## 2023-04-06 PROCEDURE — 99214 OFFICE O/P EST MOD 30 MIN: CPT | Mod: S$PBB,,, | Performed by: NURSE PRACTITIONER

## 2023-04-06 PROCEDURE — 99999 PR PBB SHADOW E&M-EST. PATIENT-LVL III: CPT | Mod: PBBFAC,,, | Performed by: NURSE PRACTITIONER

## 2023-04-06 PROCEDURE — 99213 OFFICE O/P EST LOW 20 MIN: CPT | Mod: PBBFAC | Performed by: NURSE PRACTITIONER

## 2023-04-06 PROCEDURE — 99214 PR OFFICE/OUTPT VISIT, EST, LEVL IV, 30-39 MIN: ICD-10-PCS | Mod: S$PBB,,, | Performed by: NURSE PRACTITIONER

## 2023-04-06 PROCEDURE — 1159F MED LIST DOCD IN RCRD: CPT | Mod: CPTII,,, | Performed by: NURSE PRACTITIONER

## 2023-04-06 PROCEDURE — 3008F PR BODY MASS INDEX (BMI) DOCUMENTED: ICD-10-PCS | Mod: CPTII,,, | Performed by: NURSE PRACTITIONER

## 2023-04-06 PROCEDURE — 3008F BODY MASS INDEX DOCD: CPT | Mod: CPTII,,, | Performed by: NURSE PRACTITIONER

## 2023-04-06 PROCEDURE — 3074F PR MOST RECENT SYSTOLIC BLOOD PRESSURE < 130 MM HG: ICD-10-PCS | Mod: CPTII,,, | Performed by: NURSE PRACTITIONER

## 2023-04-06 PROCEDURE — 4010F ACE/ARB THERAPY RXD/TAKEN: CPT | Mod: CPTII,,, | Performed by: NURSE PRACTITIONER

## 2023-04-06 PROCEDURE — 4010F PR ACE/ARB THEARPY RXD/TAKEN: ICD-10-PCS | Mod: CPTII,,, | Performed by: NURSE PRACTITIONER

## 2023-04-06 PROCEDURE — 3074F SYST BP LT 130 MM HG: CPT | Mod: CPTII,,, | Performed by: NURSE PRACTITIONER

## 2023-04-06 PROCEDURE — 1159F PR MEDICATION LIST DOCUMENTED IN MEDICAL RECORD: ICD-10-PCS | Mod: CPTII,,, | Performed by: NURSE PRACTITIONER

## 2023-04-06 PROCEDURE — 3078F PR MOST RECENT DIASTOLIC BLOOD PRESSURE < 80 MM HG: ICD-10-PCS | Mod: CPTII,,, | Performed by: NURSE PRACTITIONER

## 2023-04-06 PROCEDURE — 99999 PR PBB SHADOW E&M-EST. PATIENT-LVL III: ICD-10-PCS | Mod: PBBFAC,,, | Performed by: NURSE PRACTITIONER

## 2023-04-06 NOTE — PROGRESS NOTES
Subjective:   Patient ID:  Peewee Soto Jr. is a 38 y.o. male who presents for evaluation of Follow-up        HPI    Peewee Soto Jr. is a 35 year old male who presents to clinic for BP check. His current medical conditions include CAD s/p PCI of LCX and OM1, HTN, HLP, Uncontrolled DM, Obesity. He returns today and states he is doing ok.     He has right wrist in splint today due to recent sprain. Denies any chest pain or anginal equivalents. BP still elevated today but is improved from last visit.     Denies chest pain or anginal equivalents. No shortness of breath, VARGAS or palpitations. Denies orthopnea, PND or abdominal bloating. Reports regular walking without any issues lately. NO leg swelling or claudications. No recent falls, syncope or near syncopal events. Reports compliance with medications and dietary restrictions. NO CNS complaints to suggest TIA or CVA today. No signs of abnormal bleeding on ASA and Plavix.     4/28/2021 update:    He returns to cardiology clinic today for 3 month follow up. He reports some episodes of chest soreness at the end of the day. BP is well controlled today in office. Reports compliance with medications. He does endorse noncompliance with dietary restrictions. He is eating more frozen meals lately since he is living by himself.     NO chest pain or anginal equivalents today in office. No shortness of breath, VARGAS or palpitations. Denies any formal exercise program. He is working at [employer]. NO leg swelling or claudications. NO signs of abnormal bleeding on ASA and Plavix.     3/10/2022 update    He returns today and states he is doing well cardiac wise.   Denies any chest pain or anginal equivalents. No shortness of breath, VARGAS or palpitations.   Has not been compliant with diabetic diet recently.   Denies orthopnea, PND or abdominal bloating.   NO leg swelling or claudications.     CMP, Lipids, BNP, A1C pending.     Reports compliance with meds  and dietary restrictions.     9/20/2022 update    Peewee Soto Jr. Returns for follow up.     He does endorse noncompliance with dietary regimen. He is not monitoring his blood sugars.     Denies chest pain or anginal equivalents. No shortness of breath, VARGAS or palpitations. Denies orthopnea, PND or abdominal bloating. Reports regular walking without any issues lately. NO leg swelling or claudications. No recent falls, syncope or near syncopal events. Reports compliance with medications and dietary restrictions. NO CNS complaints to suggest TIA or CVA today. No signs of abnormal bleeding on ASA and Plavix.     4/6/2023 update    He returns today and states he is doing ok.     He had run out of his medications for a month or so and had some worsening chest pain symptoms.     BP well  controlled today in office.     No chest pain or anginal equivalents today in office.     Denies chest pain or anginal equivalents. No shortness of breath, VARGAS or palpitations. Denies orthopnea, PND or abdominal bloating. Reports regular walking without any issues lately. NO leg swelling or claudications. No recent falls, syncope or near syncopal events. Reports compliance with medications and dietary restrictions. NO CNS complaints to suggest TIA or CVA today. No signs of abnormal bleeding on ASA and Plavix.     Trying to do better with dietary restrictions.           Past Medical History:   Diagnosis Date    Coronary artery disease involving native coronary artery of native heart     Diabetes mellitus     Essential hypertension 3/29/2019    GERD (gastroesophageal reflux disease)     Mixed hyperlipidemia 11/30/2020    NSTEMI (non-ST elevated myocardial infarction)     S/P coronary artery stent placement     Undescended testicle before puberty        Past Surgical History:   Procedure Laterality Date    APPENDECTOMY N/A 11/11/2015    Procedure: APPENDECTOMY ;  Surgeon: Osvaldo Guy MD;  Location: [facility] OR;  Service: General;   Laterality: N/A;    CORONARY ANGIOPLASTY      CORONARY STENT PLACEMENT      HERNIA REPAIR      LEFT HEART CATHETERIZATION Left 3/18/2019    Procedure: CATHETERIZATION, HEART, LEFT;  Surgeon: Bill Moreno MD;  Location: [facility] CATH LAB;  Service: Cardiology;  Laterality: Left;       Social History     Tobacco Use    Smoking status: Never    Smokeless tobacco: Never   Substance Use Topics    Alcohol use: No    Drug use: No       Family History   Problem Relation Age of Onset    Diabetes Mother     Hypertension Father     Heart disease Maternal Grandmother      Wt Readings from Last 3 Encounters:   04/06/23 69.4 kg (153 lb)   03/13/23 68.2 kg (150 lb 3.9 oz)   09/20/22 67.2 kg (148 lb 2.4 oz)     Temp Readings from Last 3 Encounters:   03/13/23 98.2 °F (36.8 °C) (Oral)   08/01/21 98.3 °F (36.8 °C)   07/28/21 99 °F (37.2 °C) (Oral)     BP Readings from Last 3 Encounters:   04/06/23 128/78   03/13/23 (!) 134/99   09/20/22 110/80     Pulse Readings from Last 3 Encounters:   04/06/23 71   03/13/23 91   09/20/22 71     Current Outpatient Medications on File Prior to Visit   Medication Sig Dispense Refill    atorvastatin (LIPITOR) 40 MG tablet Take 1 tablet (40 mg total) by mouth once daily. 30 tablet 3    clopidogreL (PLAVIX) 75 mg tablet Take 1 tablet (75 mg total) by mouth once daily. 30 tablet 3    empagliflozin (JARDIANCE) 25 mg tablet Take 1 tablet (25 mg total) by mouth once daily. 90 tablet 3    losartan (COZAAR) 25 MG tablet Take 1 tablet (25 mg total) by mouth once daily. 30 tablet 3    metFORMIN (GLUCOPHAGE) 500 MG tablet Take 1 tablet (500 mg total) by mouth 2 (two) times daily with meals. 30 tablet 3    metoprolol succinate (TOPROL-XL) 100 MG 24 hr tablet Take 1 tablet (100 mg total) by mouth once daily. 30 tablet 3    aspirin 81 MG Chew Take 1 tablet (81 mg total) by mouth once daily. 30 tablet 11     No current facility-administered medications on file prior to visit.       Review of Systems   Constitutional:  "Positive for malaise/fatigue.   HENT:  Negative for hearing loss and hoarse voice.    Eyes:  Negative for blurred vision and visual disturbance.   Cardiovascular:  Negative for chest pain, claudication, dyspnea on exertion, irregular heartbeat, leg swelling, near-syncope, orthopnea, palpitations, paroxysmal nocturnal dyspnea and syncope.   Respiratory:  Negative for cough, hemoptysis, shortness of breath, sleep disturbances due to breathing, snoring and wheezing.    Endocrine: Negative for cold intolerance and heat intolerance.   Hematologic/Lymphatic: Bruises/bleeds easily.   Skin:  Negative for color change, dry skin and nail changes.   Musculoskeletal:  Positive for arthritis. Negative for back pain, joint pain and myalgias.   Gastrointestinal:  Negative for bloating, abdominal pain, constipation, nausea and vomiting.   Genitourinary:  Negative for dysuria, flank pain, hematuria and hesitancy.   Neurological:  Negative for headaches, light-headedness, loss of balance, numbness, paresthesias and weakness.   Psychiatric/Behavioral:  Negative for altered mental status.    Allergic/Immunologic: Negative for environmental allergies.   /78   Pulse 71   Resp 16   Ht 5' 3" (1.6 m)   Wt 69.4 kg (153 lb)   SpO2 96%   BMI 27.10 kg/m²     Objective:   Physical Exam  Vitals and nursing note reviewed.   Constitutional:       General: He is not in acute distress.     Appearance: He is well-developed. He is not diaphoretic.   HENT:      Head: Normocephalic and atraumatic.   Eyes:      Pupils: Pupils are equal, round, and reactive to light.   Neck:      Vascular: No JVD.   Cardiovascular:      Rate and Rhythm: Normal rate and regular rhythm.      Chest Wall: PMI is not displaced.      Pulses: Intact distal pulses.           Radial pulses are 2+ on the right side and 2+ on the left side.        Dorsalis pedis pulses are 2+ on the right side and 2+ on the left side.      Heart sounds: S1 normal and S2 normal. Heart " sounds not distant. No murmur heard.     Comments: CHEST PAIN FREE  Pulmonary:      Effort: Pulmonary effort is normal. No accessory muscle usage or respiratory distress.      Breath sounds: Normal breath sounds. No decreased breath sounds, wheezing or rales.   Abdominal:      General: Bowel sounds are normal. There is no distension.      Palpations: Abdomen is soft.      Tenderness: There is no abdominal tenderness.   Musculoskeletal:         General: Normal range of motion.      Cervical back: Full passive range of motion without pain, normal range of motion and neck supple.      Right ankle: No swelling.      Left ankle: No swelling.   Skin:     General: Skin is warm and dry.      Nails: There is no clubbing.   Neurological:      Mental Status: He is alert and oriented to person, place, and time.   Psychiatric:         Speech: Speech normal.         Behavior: Behavior normal.         Thought Content: Thought content normal.         Judgment: Judgment normal.       Lab Results   Component Value Date    CHOL 110 (L) 03/10/2022    CHOL 112 (L) 10/11/2021    CHOL 103 (L) 11/30/2020     Lab Results   Component Value Date    HDL 42 03/10/2022    HDL 42 10/11/2021    HDL 35 (L) 11/30/2020     Lab Results   Component Value Date    LDLCALC 40.8 (L) 03/10/2022    LDLCALC 50.6 (L) 10/11/2021    LDLCALC 39.2 (L) 11/30/2020     Lab Results   Component Value Date    TRIG 136 03/10/2022    TRIG 97 10/11/2021    TRIG 144 11/30/2020     Lab Results   Component Value Date    CHOLHDL 38.2 03/10/2022    CHOLHDL 37.5 10/11/2021    CHOLHDL 34.0 11/30/2020       Chemistry        Component Value Date/Time     03/10/2022 0942    K 4.6 03/10/2022 0942     03/10/2022 0942    CO2 30 (H) 03/10/2022 0942    BUN 10 03/10/2022 0942    CREATININE 1.0 03/10/2022 0942     (H) 03/10/2022 0942        Component Value Date/Time    CALCIUM 11.6 (H) 03/10/2022 0942    ALKPHOS 93 03/10/2022 0942    AST 42 (H) 03/10/2022 0942    ALT 90  (H) 03/10/2022 0942    BILITOT 0.5 03/10/2022 0942    ESTGFRAFRICA >60 03/10/2022 0942    EGFRNONAA >60 03/10/2022 0942          Lab Results   Component Value Date    TSH 1.109 10/21/2016     Lab Results   Component Value Date    INR 0.9 03/21/2019    INR 0.9 03/18/2019    INR 1.0 11/11/2015     Lab Results   Component Value Date    WBC 8.90 03/01/2020    HGB 14.4 03/01/2020    HCT 43.4 03/01/2020    MCV 94 03/01/2020     03/01/2020        Assessment:      1. Coronary artery disease of native artery of native heart with stable angina pectoris    2. S/P coronary artery stent placement    3. Essential hypertension    4. Mixed hyperlipidemia    5. Diabetes mellitus without complication            Plan:       Continue same meds for now  Dash diet 2 gm sodium restriction  Encourage daily walking  Profile BP at home  RTC In 6 months or sooner if needed.     MIKE Foster  [facility]

## 2023-04-13 ENCOUNTER — LAB VISIT (OUTPATIENT)
Dept: LAB | Facility: HOSPITAL | Age: 38
End: 2023-04-13
Attending: NURSE PRACTITIONER
Payer: MEDICAID

## 2023-04-13 DIAGNOSIS — I25.118 CORONARY ARTERY DISEASE OF NATIVE ARTERY OF NATIVE HEART WITH STABLE ANGINA PECTORIS: ICD-10-CM

## 2023-04-13 LAB
ALBUMIN SERPL BCP-MCNC: 4.4 G/DL (ref 3.5–5.2)
ALP SERPL-CCNC: 77 U/L (ref 55–135)
ALT SERPL W/O P-5'-P-CCNC: 52 U/L (ref 10–44)
ANION GAP SERPL CALC-SCNC: 7 MMOL/L (ref 8–16)
AST SERPL-CCNC: 24 U/L (ref 10–40)
BILIRUB SERPL-MCNC: 0.6 MG/DL (ref 0.1–1)
BNP SERPL-MCNC: 37 PG/ML (ref 0–99)
BUN SERPL-MCNC: 12 MG/DL (ref 6–20)
CALCIUM SERPL-MCNC: 10.6 MG/DL (ref 8.7–10.5)
CHLORIDE SERPL-SCNC: 103 MMOL/L (ref 95–110)
CO2 SERPL-SCNC: 29 MMOL/L (ref 23–29)
CREAT SERPL-MCNC: 0.9 MG/DL (ref 0.5–1.4)
EST. GFR  (NO RACE VARIABLE): >60 ML/MIN/1.73 M^2
GLUCOSE SERPL-MCNC: 151 MG/DL (ref 70–110)
MAGNESIUM SERPL-MCNC: 1.9 MG/DL (ref 1.6–2.6)
POTASSIUM SERPL-SCNC: 4.4 MMOL/L (ref 3.5–5.1)
PROT SERPL-MCNC: 7.1 G/DL (ref 6–8.4)
SODIUM SERPL-SCNC: 139 MMOL/L (ref 136–145)

## 2023-04-13 PROCEDURE — 80053 COMPREHEN METABOLIC PANEL: CPT | Performed by: NURSE PRACTITIONER

## 2023-04-13 PROCEDURE — 83880 ASSAY OF NATRIURETIC PEPTIDE: CPT | Performed by: NURSE PRACTITIONER

## 2023-04-13 PROCEDURE — 83735 ASSAY OF MAGNESIUM: CPT | Performed by: NURSE PRACTITIONER

## 2023-04-13 PROCEDURE — 80061 LIPID PANEL: CPT | Performed by: NURSE PRACTITIONER

## 2023-04-13 PROCEDURE — 36415 COLL VENOUS BLD VENIPUNCTURE: CPT | Performed by: NURSE PRACTITIONER

## 2023-04-13 PROCEDURE — 83036 HEMOGLOBIN GLYCOSYLATED A1C: CPT | Performed by: NURSE PRACTITIONER

## 2023-04-14 LAB
CHOLEST SERPL-MCNC: 142 MG/DL (ref 120–199)
CHOLEST/HDLC SERPL: 3.3 {RATIO} (ref 2–5)
ESTIMATED AVG GLUCOSE: 240 MG/DL (ref 68–131)
HBA1C MFR BLD: 10 % (ref 4–5.6)
HDLC SERPL-MCNC: 43 MG/DL (ref 40–75)
HDLC SERPL: 30.3 % (ref 20–50)
LDLC SERPL CALC-MCNC: 67.2 MG/DL (ref 63–159)
NONHDLC SERPL-MCNC: 99 MG/DL
TRIGL SERPL-MCNC: 159 MG/DL (ref 30–150)

## 2023-04-17 ENCOUNTER — TELEPHONE (OUTPATIENT)
Dept: CARDIOLOGY | Facility: CLINIC | Age: 38
End: 2023-04-17
Payer: MEDICAID

## 2023-04-17 NOTE — PROGRESS NOTES
Please notify patient    Labs reviewed  A1C is worse than previous check  Needs appt with diabetes- I requested this at previous visits but has yet to be done. Please arrange asap.     Lipids stable  CMP WNL    Thanks  Rosalba

## 2023-04-17 NOTE — TELEPHONE ENCOUNTER
The patient has been notified of this information and all questions answered.    Instructed pt to reach out himself and make appt as well with diabetes or a primary care physician - sent another message to [facility] to reach out      ----- Message from MIKE Castillo sent at 4/17/2023  8:32 AM CDT -----  Please notify patient    Labs reviewed  A1C is worse than previous check  Needs appt with diabetes- I requested this at previous visits but has yet to be done. Please arrange asap.     Lipids stable  CMP WNL    Thanks  Rosalba

## 2023-04-24 ENCOUNTER — PATIENT MESSAGE (OUTPATIENT)
Dept: CARDIOLOGY | Facility: CLINIC | Age: 38
End: 2023-04-24
Payer: MEDICAID

## 2023-04-26 ENCOUNTER — OFFICE VISIT (OUTPATIENT)
Dept: DIABETES | Facility: CLINIC | Age: 38
End: 2023-04-26
Payer: MEDICAID

## 2023-04-26 DIAGNOSIS — E11.65 UNCONTROLLED TYPE 2 DIABETES MELLITUS WITH HYPERGLYCEMIA: ICD-10-CM

## 2023-04-26 DIAGNOSIS — I10 ESSENTIAL HYPERTENSION: ICD-10-CM

## 2023-04-26 DIAGNOSIS — E11.9 DIABETES MELLITUS WITHOUT COMPLICATION: ICD-10-CM

## 2023-04-26 DIAGNOSIS — E78.2 MIXED HYPERLIPIDEMIA: ICD-10-CM

## 2023-04-26 DIAGNOSIS — I25.118 CORONARY ARTERY DISEASE OF NATIVE ARTERY OF NATIVE HEART WITH STABLE ANGINA PECTORIS: Primary | ICD-10-CM

## 2023-04-26 PROCEDURE — 99214 PR OFFICE/OUTPT VISIT, EST, LEVL IV, 30-39 MIN: ICD-10-PCS | Mod: 95,,, | Performed by: NURSE PRACTITIONER

## 2023-04-26 PROCEDURE — 3046F PR MOST RECENT HEMOGLOBIN A1C LEVEL > 9.0%: ICD-10-PCS | Mod: CPTII,95,, | Performed by: NURSE PRACTITIONER

## 2023-04-26 PROCEDURE — 99214 OFFICE O/P EST MOD 30 MIN: CPT | Mod: 95,,, | Performed by: NURSE PRACTITIONER

## 2023-04-26 PROCEDURE — 3046F HEMOGLOBIN A1C LEVEL >9.0%: CPT | Mod: CPTII,95,, | Performed by: NURSE PRACTITIONER

## 2023-04-26 PROCEDURE — 4010F PR ACE/ARB THEARPY RXD/TAKEN: ICD-10-PCS | Mod: CPTII,95,, | Performed by: NURSE PRACTITIONER

## 2023-04-26 PROCEDURE — 4010F ACE/ARB THERAPY RXD/TAKEN: CPT | Mod: CPTII,95,, | Performed by: NURSE PRACTITIONER

## 2023-04-26 RX ORDER — METFORMIN HYDROCHLORIDE 1000 MG/1
1000 TABLET ORAL 2 TIMES DAILY WITH MEALS
Qty: 180 TABLET | Refills: 1 | Status: SHIPPED | OUTPATIENT
Start: 2023-04-26 | End: 2024-01-08

## 2023-04-26 NOTE — PROGRESS NOTES
Telemedicine Visit    The patient location is home  The chief complaint leading to consultation is: Diabetes    Visit type: audiovisual VIA [employer]XM[employer].     Face to Face time with patient: 30  60 minutes of total time spent on the encounter, which includes face to face time and non-face to face time preparing to see the patient (eg, review of tests), Obtaining and/or reviewing separately obtained history, Documenting clinical information in the electronic or other health record, Independently interpreting results (not separately reported) and communicating results to the patient/family/caregiver, or Care coordination (not separately reported).  Each patient to whom he or she provides medical services by telemedicine is:  (1) informed of the relationship between the physician and patient and the respective role of any other health care provider with respect to management of the patient; and (2) notified that he or she may decline to receive medical services by telemedicine and may withdraw from such care at any time.  Notes:         Peewee Soto Jr. is a 38 y.o. male who  has a past medical history of Coronary artery disease involving native coronary artery of native heart, Diabetes mellitus, Essential hypertension (3/29/2019), GERD (gastroesophageal reflux disease), Mixed hyperlipidemia (11/30/2020), NSTEMI (non-ST elevated myocardial infarction), S/P coronary artery stent placement, and Undescended testicle before puberty., who present for an initial evaluation of Type 2 diabetes mellitus.     CHIEF COMPLAINT: Diabetes Consultation    PCP: Primary Doctor No - Needs new PCP.     The patient was initially diagnosed with diabetes since age 31.   Recently restarted on medications in March after being out for 3 months due to financial reasons.     Previous failed treatments include:  None    Social Documentation:  Patient lives in [place]. 2 children ages 18 and 11, single.   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   Exercise: mostly physical activity at work, no formal exercise.   Diet: Drinking 6-7 regular cold drinks a day.   Eating candy occasionally  Supper: chili/cheese fries, jambalaya, fried chicken.   does not cook regularly.     Current monitoring regimen:  does not check. Has a meter.    Attempted to have patient check glucose during visit, but he is unsure how to use meter. Instructed to bring with him to education appt.     Current Diabetes related symptoms/problems include polydipsia and polyuria and have been unchanged.     Diabetes related complications:   cardiovascular disease.   denies Pancreatitis  denies Gastroparesis  denies DKA  denies Hx/family Hx of MEN2/MTC  denies Frequent UTIs/yeast infections    Cardiovascular Risk Factors: dyslipidemia, family history of premature cardiovascular disease, hypertension, male gender, obesity (BMI >30 kg/m2), and sedentary lifestyle.    Any episodes of hypoglycemia?  No.   Hypoglycemia Unawareness? No  Severe hypoglycemia requiring 3rd party? No    Seen by Diabetes Education in last year? no    Diabetes Medications               empagliflozin (JARDIANCE) 25 mg tablet Take 1 tablet (25 mg total) by mouth once daily.    metFORMIN (GLUCOPHAGE) 500 MG tablet Take 1 tablet (500 mg total) by mouth 2 (two) times daily with meals.     DIABETES DISEASE MANAGEMENT STATUS  Statin: Not taking  ACE/ARB: Not taking  Screening or Prevention Patient's value Goal Complete/Controlled?   HgA1C Testing and Control   Lab Results   Component Value Date    HGBA1C 10.0 (H) 04/13/2023      Annually/Less than 8% No     Lipid profile : 04/13/2023 Annually Yes     LDL control Lab Results   Component Value Date    LDLCALC 67.2 04/13/2023    Annually/Less than 100 mg/dl  Yes     Nephropathy screening No results found for: LABMICR  Lab Results   Component Value Date    PROTEINUA Trace (A) 06/05/2019     No results found for: UTPCR   Annually No     Blood pressure BP  Readings from Last 1 Encounters:   04/06/23 128/78    Less than 140/90 Yes     Dilated retinal exam Most Recent Eye Exam Date: Not Found Annually No     Foot exam   Most Recent Foot Exam Date: Not Found Annually No     Patient's medications, allergies, past medical, surgical, social and family histories were reviewed and updated as appropriate.     Review of Systems   Constitutional:  Negative for weight loss.   Eyes:  Negative for blurred vision and double vision.   Cardiovascular:  Negative for chest pain.   Gastrointestinal:  Negative for nausea and vomiting.   Genitourinary:  Negative for frequency.   Musculoskeletal:  Negative for falls.   Neurological:  Negative for dizziness and weakness.   Endo/Heme/Allergies:  Negative for polydipsia.   Psychiatric/Behavioral:  Negative for depression.    All other systems reviewed and are negative.          Physical Exam  Constitutional:       General: He is not in acute distress.     Appearance: Normal appearance.   Pulmonary:      Effort: No respiratory distress.   Neurological:      Mental Status: He is alert and oriented to person, place, and time.   Psychiatric:         Mood and Affect: Mood normal.         Behavior: Behavior normal.      There were no vitals taken for this visit.  Wt Readings from Last 3 Encounters:   04/06/23 69.4 kg (153 lb)   03/13/23 68.2 kg (150 lb 3.9 oz)   09/20/22 67.2 kg (148 lb 2.4 oz)       LAB REVIEW  Lab Results   Component Value Date     04/13/2023    K 4.4 04/13/2023     04/13/2023    CO2 29 04/13/2023    BUN 12 04/13/2023    CREATININE 0.9 04/13/2023    CALCIUM 10.6 (H) 04/13/2023    ANIONGAP 7 (L) 04/13/2023    EGFRNORACEVR >60 04/13/2023     No results found for: CPEPTIDE, GLUTAMICACID, INSLNABS  Hemoglobin A1C   Date Value Ref Range Status   04/13/2023 10.0 (H) 4.0 - 5.6 % Final     Comment:     ADA Screening Guidelines:  5.7-6.4%  Consistent with prediabetes  >or=6.5%  Consistent with diabetes    High levels of fetal  hemoglobin interfere with the HbA1C  assay. Heterozygous hemoglobin variants (HbS, HgC, etc)do  not significantly interfere with this assay.   However, presence of multiple variants may affect accuracy.     03/10/2022 8.7 (H) 4.0 - 5.6 % Final     Comment:     ADA Screening Guidelines:  5.7-6.4%  Consistent with prediabetes  >or=6.5%  Consistent with diabetes    High levels of fetal hemoglobin interfere with the HbA1C  assay. Heterozygous hemoglobin variants (HbS, HgC, etc)do  not significantly interfere with this assay.   However, presence of multiple variants may affect accuracy.     10/11/2021 7.4 (H) 4.0 - 5.6 % Final     Comment:     ADA Screening Guidelines:  5.7-6.4%  Consistent with prediabetes  >or=6.5%  Consistent with diabetes    High levels of fetal hemoglobin interfere with the HbA1C  assay. Heterozygous hemoglobin variants (HbS, HgC, etc)do  not significantly interfere with this assay.   However, presence of multiple variants may affect accuracy.         ASSESSMENT    ICD-10-CM ICD-9-CM   1. Coronary artery disease of native artery of native heart with stable angina pectoris  I25.118 414.01     413.9   2. Uncontrolled type 2 diabetes mellitus with hyperglycemia  E11.65 250.02   3. Essential hypertension  I10 401.9   4. Mixed hyperlipidemia  E78.2 272.2   5. Diabetes mellitus without complication  E11.9 250.00        PLAN  Diagnoses and all orders for this visit:    Coronary artery disease of native artery of native heart with stable angina pectoris  -     empagliflozin (JARDIANCE) 25 mg tablet; Take 1 tablet (25 mg total) by mouth once daily.    Uncontrolled type 2 diabetes mellitus with hyperglycemia  -     Ambulatory referral/consult to Endocrinology  -     Ambulatory referral/consult to Diabetes Education; Future  -     metFORMIN (GLUCOPHAGE) 1000 MG tablet; Take 1 tablet (1,000 mg total) by mouth 2 (two) times daily with meals.    Essential hypertension    Mixed hyperlipidemia    Diabetes mellitus  without complication  -     empagliflozin (JARDIANCE) 25 mg tablet; Take 1 tablet (25 mg total) by mouth once daily.        Reviewed pathophysiology of diabetes, complications related to the disease, importance of annual dilated eye exam and daily foot examination. Explained MOA, SE, dosage of medications. Written instructions given and reviewed with patient and patient verbalizes understanding.     PATIENT INSTRUCTIONS    Lifestyle modification with diabetic diet and at least 30 minutes of physical activity daily recommended.   Switch from regular cold drinks to zero sugar and increase water intake.   Recommend daily walks after supper for 30 minutes.   Refer to diabetes education.  - bring glucose meter with you.   You are overdue for your yearly diabetic eye exam. Please schedule an appointment with your eye care provider at your earliest convenience. If your eye care provider is outside of the [facility], please have them fax a report of the exam to [facility] Fax [phone].    Diabetic Foot Exam deferred today due to virtual visit. Will plan to be done at next in-person visit.      Continue Jardiance 25 mg by mouth daily.   Increase Metformin to 1000 mg by mouth twice daily with meals.     Check blood sugar twice daily. Every morning when you wake up and 1-2 hours after main meal of the day. Keep log and upload to [employer] prior to each visit.   Blood Sugar Goals:       Fastin-130.       1-2 hours after a meal: Less than 180.        Follow up in about 4 weeks (around 2023) for VIRTUAL, SCHEDULE DM EDUCATION.    Portions of this note were prepared with [employer] voice recognition transcription software. Grammatical errors, including garbled syntax, mangle pronouns, and other bizarre constructions may be attributed to that software system.

## 2023-04-26 NOTE — PATIENT INSTRUCTIONS
PATIENT INSTRUCTIONS    Lifestyle modification with diabetic diet and at least 30 minutes of physical activity daily recommended.   Switch from regular cold drinks to zero sugar and increase water intake.   Recommend daily walks after supper for 30 minutes.   Refer to diabetes education.  - bring glucose meter with you.   You are overdue for your yearly diabetic eye exam. Please schedule an appointment with your eye care provider at your earliest convenience. If your eye care provider is outside of the [facility], please have them fax a report of the exam to [facility] Fax [phone].    Diabetic Foot Exam deferred today due to virtual visit. Will plan to be done at next in-person visit.      Continue Jardiance 25 mg by mouth daily.   Increase Metformin to 1000 mg by mouth twice daily with meals.     Check blood sugar twice daily. Every morning when you wake up and 1-2 hours after main meal of the day. Keep log and upload to [employer] prior to each visit.   Blood Sugar Goals:       Fastin-130.       1-2 hours after a meal: Less than 180.

## 2023-05-03 ENCOUNTER — CLINICAL SUPPORT (OUTPATIENT)
Dept: DIABETES | Facility: CLINIC | Age: 38
End: 2023-05-03
Payer: MEDICAID

## 2023-05-03 DIAGNOSIS — E11.65 UNCONTROLLED TYPE 2 DIABETES MELLITUS WITH HYPERGLYCEMIA: Primary | ICD-10-CM

## 2023-05-03 PROCEDURE — 99212 OFFICE O/P EST SF 10 MIN: CPT | Mod: PBBFAC | Performed by: DIETITIAN, REGISTERED

## 2023-05-03 PROCEDURE — 99999 PR PBB SHADOW E&M-EST. PATIENT-LVL II: CPT | Mod: PBBFAC,,, | Performed by: DIETITIAN, REGISTERED

## 2023-05-03 PROCEDURE — G0108 DIAB MANAGE TRN  PER INDIV: HCPCS | Mod: PBBFAC | Performed by: DIETITIAN, REGISTERED

## 2023-05-03 PROCEDURE — 99999 PR PBB SHADOW E&M-EST. PATIENT-LVL II: ICD-10-PCS | Mod: PBBFAC,,, | Performed by: DIETITIAN, REGISTERED

## 2023-05-03 NOTE — PROGRESS NOTES
Diabetes Care Specialist Progress Note  Author: Esperanza Jara RD, CDE  Date: 5/3/2023    Program Intake  Reason for Diabetes Program Visit:: Initial Diabetes Assessment  Current diabetes risk level:: moderate  In the last 12 months, have you:: used emergency room services  Was the ER or hospital admission related to diabetes?: Yes  Permission to speak with others about care:: no    Patient diagnosed with Type 2 diabetes about 7 years ago, has never participated in formal diabetes education.     Lab Results   Component Value Date    HGBA1C 10.0 (H) 04/13/2023       Clinical    Patient Health Rating  Compared to other people your age, how would you rate your health?: Poor    Problem Review  Reviewed Problem List with Patient: yes  Active comorbidities affecting diabetes self-care.: yes  Comorbidities: Hypertension, Cardiovascular Disease, Heart Attack  Reviewed health maintenance: yes    Clinical Assessment  Current Diabetes Treatment: Diet  Have you ever experienced hypoglycemia (low blood sugar)?: no  Have you ever experienced hyperglycemia (high blood sugar)?: yes  In the last month, how often have you experienced high blood sugar?: other (see comments) (not sure)  Are you able to tell when your blood sugar is high?: Yes  What are your symptoms?: fatigue, rapid breathing, thirst  Have you ever been hospitalized because your blood sugar was high?: no    Medication Information  How do you obtain your medications?: Patient drives  How many days a week do you miss your medications?: Never  Do you use a pill box or medication chart to help you manage your medications?: No  Do you sometimes have difficulty refilling your medications?: No  Medication adherence impacting ability to self-manage diabetes?: No    Labs  Do you have regular lab work to monitor your medications?: Yes  Type of Regular Lab Work: A1c, Cholesterol, Microalbumin  Where do you get your labs drawn?: [facility]  Lab Compliance Barriers: No    Nutritional  Status  Diet: Regular  Meal Plan 24 Hour Recall: Breakfast, Lunch, Dinner, Snack  Meal Plan 24 Hour Recall - Breakfast: fruit, soda  Meal Plan 24 Hour Recall - Lunch: chicken tenders, hamburger, fries, soda  Meal Plan 24 Hour Recall - Dinner: MRE- pasta with vegetables, soda or crystal light  Meal Plan 24 Hour Recall - Snack: drinks ~ 2L  of soda each day  Change in appetite?: No  Dentation:: Intact  Recent Changes in Weight: No Recent Weight Change  Current nutritional status an area of need that is impacting patient's ability to self-manage diabetes?: No    Additional Social History    Support  Does anyone support you with your diabetes care?: yes  Who supports you?: self  Who takes you to your medical appointments?: self  Does the current support meet the patient's needs?: Yes  Is Support an area impacting ability to self-manage diabetes?: No    Access to Mass Media & Technology  Does the patient have access to any of the following devices or technologies?: Smart phone  Media or technology needs impacting ability to self-manage diabetes?: No    Cognitive/Behavioral Health  Alert and Oriented: Yes  Difficulty Thinking: No  Requires Prompting: No  Requires assistance for routine expression?: No  Cognitive or behavioral barriers impacting ability to self-manage diabetes?: No    Culture/Pentecostal  Culture or Zoroastrianism beliefs that may impact ability to access healthcare: No    Communication  Language preference: English  Hearing Problems: No  Vision Problems: No  Communication needs impacting ability to self-manage diabetes?: No    Health Literacy  Preferred Learning Method: Face to Face, Reading Materials  Health literacy needs impacting ability to self-manage diabetes?: No      Diabetes Self-Management Skills Assessment    Diabetes Disease Process/Treatment Options  Patient/caregiver able to state what happens when someone has diabetes.: yes  Patient/caregiver knows what type of diabetes they have.: yes  Diabetes  Type : Type II  Patient/caregiver able to identify at least three signs and symptoms of diabetes.: no  Patient able to identify at least three risk factors for diabetes.: no  Diabetes Disease Process/Treatment Options: Skills Assessment Completed: Yes  Assessment indicates:: Adequate understanding  Area of need?: No    Nutrition/Healthy Eating  Challenges to healthy eating:: other (see comments) (sodas throughout the day)  Method of carbohydrate measurement:: no method  Patient can identify foods that impact blood sugar.: yes  Patient-identified foods:: soda  Nutrition/Healthy Eating Skills Assessment Completed:: Yes  Assessment indicates:: Instruction Needed  Area of need?: Yes    Physical Activity/Exercise  Patient's daily activity level:: constantly moving  Patient formally exercises outside of work.: no (moves around at work each day, no formal exercise.)  Reasons for not exercising:: other (see comments)  Physical Activity/Exercise Skills Assessment Completed: : Yes  Assessment indicates:: Adequate understanding  Area of need?: No    Medications  Patient is able to describe current diabetes management routine.: yes  Diabetes management routine:: oral medications  Patient is able to identify current diabetes medications, dosages, and appropriate timing of medications.: yes (Metformin 1000mg BID // Jardiance 25mg QD)  Patient understands the purpose of the medications taken for diabetes.: yes  Patient reports problems or concerns with current medication regimen.: no  Medication Skills Assessment Completed:: Yes  Assessment indicates:: Adequate understanding  Area of need?: No    Home Blood Glucose Monitoring  Patient states that blood sugar is checked at home daily.: no  Reasons for not monitoring:: other (see comments) (doesn't like to stick himself)  Home Blood Glucose Monitoring Skills Assessment Completed: : Yes  Assessment indicates:: Instruction Needed  Area of need?: Yes    Acute Complications  Patient is  able to identify types of acute complications: No  Acute Complications Skills Assessment Completed: : Yes  Assessment indicates:: Instruction Needed  Area of need?: No    Chronic Complications  Patient can identify major chronic complications of diabetes.: yes  Stated chronic complications:: heart disease/heart attack  Patient can identify ways to prevent or delay diabetes complications.: yes  Stated ways to prevent complications:: controlling blood sugar  Patient is taking statin?: Yes  Chronic Complications Skills Assessment Completed: : Yes  Assessment indicates:: Adequate understanding  Area of need?: No              Assessment Summary and Plan    Based on today's diabetes care assessment, the following areas of need were identified:      Social 5/3/2023   Support No   Access to Mass Media/Tech No   Cognitive/Behavioral Health No   Culture/Quaker No   Communication No   Health Literacy No        Clinical 5/3/2023   Medication Adherence No   Lab Compliance No   Nutritional Status No        Diabetes Self-Management Skills 5/3/2023   Diabetes Disease Process/Treatment Options Reviewed pathophysiology of type 2 diabetes, complications related to the disease, importance of annual dilated eye exam, and daily foot exam.   Nutrition/Healthy Eating Yes- see care plan.    Physical Activity/Exercise No   Medication Discussed MOA, onset, side effects, dosage of meds.  Patient was without diabetes medications for a few months. Went to ER to get refill on 3/13/23. Back on track with taking medications as prescribed.    Home Blood Glucose Monitoring Yes- see care plan.    Acute Complications Reviewed blood glucose goals, prevention, detection, signs and symptoms, and treatment of hypoglycemia and hyperglycemia, and when to contact the clinic.   Chronic Complications Discussed importance of A1c less than 7 to reduce risk of micro and macro complications, including nephropathy, neuropathy, retinopathy, heart attack and stroke.  Reviewed the importance of controlled Blood Pressure and Cholesterol Lab Values in preventing disease.   Health maintenance reviewed            Today's interventions were provided through individual discussion, instruction, and written materials were provided.      Patient verbalized understanding of instruction and written materials.  Pt was able to return back demonstration of instructions today. Patient understood key points, needs reinforcement and further instruction.     Diabetes Self-Management Care Plan:    Today's Diabetes Self-Management Care Plan was developed with Peewee's input. Peewee has agreed to work toward the following goal(s) to improve his/her overall diabetes control.      Care Plan: Diabetes Management   Updates made since 4/3/2023 12:00 AM        Problem: Healthy Eating         Goal: Patient will stop drinking regular sodas and drink sugar free beverages instead.    Start Date: 5/3/2023   Expected End Date: 11/3/2023   Priority: High   Barriers: Other (comments)   Note:    Everyone around him drinks regular soda.        Task: Reviewed the sources and role of Carbohydrate, Protein, and Fat and how each nutrient impacts blood sugar. Completed 5/3/2023        Task: Provided visual examples using dry measuring cups, food models, and other familiar objects such as computer mouse, deck or cards, tennis ball etc. to help with visualization of portions. Completed 5/3/2023        Task: Discussed strategies for choosing healthier menu options when dining out. Completed 5/3/2023        Task: Recommended replacing beverages containing high sugar content with noncaloric/sugar free options and/or water. Completed 5/3/2023        Task: Provided Sample plate method and reviewed the use of the plate to estimate amounts of carbohydrate per meal. Completed 5/3/2023        Problem: Blood Glucose Self-Monitoring         Goal: Patient will check blood sugar at least 3 times per week.    Start Date: 5/3/2023    Expected End Date: 11/3/2023   Priority: Medium   Barriers: Pain Associated with Finger Sticks        Task: Reviewed the importance of self-monitoring blood glucose and keeping logs. Completed 5/3/2023        Task: Provided patient with blood glucose logs, reviewed appropriate timing and frequency to SMBG, education on parameters on when to notify provider and advised patient to bring logs to all appts with PCP/Endocrinologist/Diabetes Care Specialist. Completed 5/3/2023        Task: Discussed ways to minimize pain when monitoring blood glucose. Completed 5/3/2023          Follow Up Plan     Follow up in about 6 weeks (around 6/14/2023) for E11.65.    Today's care plan and follow up schedule was discussed with patient.  Peewee verbalized understanding of the care plan, goals, and agrees to follow up plan.        The patient was encouraged to communicate with his/her health care provider/physician and care team regarding his/her condition(s) and treatment.  I provided the patient with my contact information today and encouraged to contact me via phone or [facility] Patient Portal as needed.     Length of Visit   Total Time: 60 Minutes

## 2023-05-18 NOTE — OP NOTE
INPATIENT Operative Note         SUMMARY     Surgery Date: 3/18/2019     Surgeon(s) and Role:     * Bill Moreno MD - Primary    ASSISTANT:none    Pre-op Diagnosis:  nstemi      Post-op Diagnosis: nstemi    Procedure(s) (LRB):  CATHETERIZATION, HEART, LEFT (Left)  Stent lcx.  COMPLICATION:none    Anesthesia: RN IV Sedation    Findings/Key Components:  Non obs cad of lmca lad and rca   90% mid  lcx om1 treated with resolute stent.  lvf normal.    Estimated Blood Loss: < 50 ML.         SPECIMEN: NONE    Devices/Prostetics: resolute marcin.    PLAN:  Routine post stent care.   No

## 2023-11-18 DIAGNOSIS — E11.9 DIABETES MELLITUS WITHOUT COMPLICATION: ICD-10-CM

## 2023-11-18 DIAGNOSIS — I25.118 CORONARY ARTERY DISEASE OF NATIVE ARTERY OF NATIVE HEART WITH STABLE ANGINA PECTORIS: ICD-10-CM

## 2023-11-20 RX ORDER — EMPAGLIFLOZIN 25 MG/1
25 TABLET, FILM COATED ORAL
Qty: 90 TABLET | Refills: 0 | Status: SHIPPED | OUTPATIENT
Start: 2023-11-20 | End: 2024-03-11

## 2023-12-01 ENCOUNTER — HOSPITAL ENCOUNTER (EMERGENCY)
Facility: HOSPITAL | Age: 38
Discharge: HOME | End: 2023-12-02
Attending: EMERGENCY MEDICINE
Payer: MEDICAID

## 2023-12-01 DIAGNOSIS — R07.9 CHEST PAIN: Primary | ICD-10-CM

## 2023-12-01 LAB
BASOPHILS # BLD AUTO: 0.05 K/UL (ref 0–0.2)
BASOPHILS NFR BLD: 0.6 % (ref 0–1.9)
DIFFERENTIAL METHOD: ABNORMAL
EOSINOPHIL # BLD AUTO: 0.4 K/UL (ref 0–0.5)
EOSINOPHIL NFR BLD: 4.4 % (ref 0–8)
ERYTHROCYTE [DISTWIDTH] IN BLOOD BY AUTOMATED COUNT: 11.6 % (ref 11.5–14.5)
HCT VFR BLD AUTO: 48 % (ref 40–54)
HEP C VIRUS HOLD SPECIMEN: NORMAL
HGB BLD-MCNC: 16.1 G/DL (ref 14–18)
IMM GRANULOCYTES # BLD AUTO: 0.03 K/UL (ref 0–0.04)
IMM GRANULOCYTES NFR BLD AUTO: 0.4 % (ref 0–0.5)
LYMPHOCYTES # BLD AUTO: 3.5 K/UL (ref 1–4.8)
LYMPHOCYTES NFR BLD: 41.6 % (ref 18–48)
MCH RBC QN AUTO: 32 PG (ref 27–31)
MCHC RBC AUTO-ENTMCNC: 33.5 G/DL (ref 32–36)
MCV RBC AUTO: 95 FL (ref 82–98)
MONOCYTES # BLD AUTO: 0.7 K/UL (ref 0.3–1)
MONOCYTES NFR BLD: 8.2 % (ref 4–15)
NEUTROPHILS # BLD AUTO: 3.8 K/UL (ref 1.8–7.7)
NEUTROPHILS NFR BLD: 44.8 % (ref 38–73)
NRBC BLD-RTO: 0 /100 WBC
PLATELET # BLD AUTO: 311 K/UL (ref 150–450)
PMV BLD AUTO: 8.7 FL (ref 9.2–12.9)
POCT GLUCOSE: 131 MG/DL (ref 70–110)
RBC # BLD AUTO: 5.03 M/UL (ref 4.6–6.2)
WBC # BLD AUTO: 8.41 K/UL (ref 3.9–12.7)

## 2023-12-01 PROCEDURE — 86803 HEPATITIS C AB TEST: CPT | Performed by: EMERGENCY MEDICINE

## 2023-12-01 PROCEDURE — 84484 ASSAY OF TROPONIN QUANT: CPT | Performed by: NURSE PRACTITIONER

## 2023-12-01 PROCEDURE — 80053 COMPREHEN METABOLIC PANEL: CPT | Performed by: NURSE PRACTITIONER

## 2023-12-01 PROCEDURE — 99285 EMERGENCY DEPT VISIT HI MDM: CPT | Mod: 25

## 2023-12-01 PROCEDURE — 87389 HIV-1 AG W/HIV-1&-2 AB AG IA: CPT | Performed by: EMERGENCY MEDICINE

## 2023-12-01 PROCEDURE — 85025 COMPLETE CBC W/AUTO DIFF WBC: CPT | Performed by: NURSE PRACTITIONER

## 2023-12-01 PROCEDURE — 82962 GLUCOSE BLOOD TEST: CPT

## 2023-12-01 PROCEDURE — 83880 ASSAY OF NATRIURETIC PEPTIDE: CPT | Performed by: NURSE PRACTITIONER

## 2023-12-02 VITALS
SYSTOLIC BLOOD PRESSURE: 128 MMHG | DIASTOLIC BLOOD PRESSURE: 74 MMHG | OXYGEN SATURATION: 95 % | HEIGHT: 63 IN | WEIGHT: 152.56 LBS | RESPIRATION RATE: 20 BRPM | TEMPERATURE: 98 F | BODY MASS INDEX: 27.03 KG/M2 | HEART RATE: 79 BPM

## 2023-12-02 LAB
ALBUMIN SERPL BCP-MCNC: 4.1 G/DL (ref 3.5–5.2)
ALP SERPL-CCNC: 79 U/L (ref 55–135)
ALT SERPL W/O P-5'-P-CCNC: 53 U/L (ref 10–44)
ANION GAP SERPL CALC-SCNC: 13 MMOL/L (ref 8–16)
AST SERPL-CCNC: 21 U/L (ref 10–40)
BILIRUB SERPL-MCNC: 0.3 MG/DL (ref 0.1–1)
BNP SERPL-MCNC: <10 PG/ML (ref 0–99)
BUN SERPL-MCNC: 14 MG/DL (ref 6–20)
CALCIUM SERPL-MCNC: 9.8 MG/DL (ref 8.7–10.5)
CHLORIDE SERPL-SCNC: 103 MMOL/L (ref 95–110)
CO2 SERPL-SCNC: 25 MMOL/L (ref 23–29)
CREAT SERPL-MCNC: 0.9 MG/DL (ref 0.5–1.4)
EST. GFR  (NO RACE VARIABLE): >60 ML/MIN/1.73 M^2
GLUCOSE SERPL-MCNC: 148 MG/DL (ref 70–110)
HCV AB SERPL QL IA: NEGATIVE
HIV 1+2 AB+HIV1 P24 AG SERPL QL IA: NEGATIVE
POTASSIUM SERPL-SCNC: 3.9 MMOL/L (ref 3.5–5.1)
PROT SERPL-MCNC: 7.1 G/DL (ref 6–8.4)
SODIUM SERPL-SCNC: 141 MMOL/L (ref 136–145)
TROPONIN I SERPL DL<=0.01 NG/ML-MCNC: 0.01 NG/ML (ref 0–0.03)

## 2023-12-02 PROCEDURE — 25000003 PHARM REV CODE 250: Performed by: EMERGENCY MEDICINE

## 2023-12-02 RX ORDER — ATORVASTATIN CALCIUM 40 MG/1
40 TABLET, FILM COATED ORAL DAILY
Qty: 30 TABLET | Refills: 0 | Status: SHIPPED | OUTPATIENT
Start: 2023-12-02 | End: 2024-02-12

## 2023-12-02 RX ORDER — CLOPIDOGREL BISULFATE 75 MG/1
75 TABLET ORAL DAILY
Qty: 30 TABLET | Refills: 0 | Status: SHIPPED | OUTPATIENT
Start: 2023-12-02 | End: 2024-02-12

## 2023-12-02 RX ORDER — LOSARTAN POTASSIUM 25 MG/1
25 TABLET ORAL DAILY
Qty: 30 TABLET | Refills: 0 | Status: SHIPPED | OUTPATIENT
Start: 2023-12-02 | End: 2024-02-12

## 2023-12-02 RX ORDER — CLOPIDOGREL 300 MG/1
300 TABLET, FILM COATED ORAL
Status: COMPLETED | OUTPATIENT
Start: 2023-12-02 | End: 2023-12-02

## 2023-12-02 RX ORDER — ASPIRIN 325 MG
325 TABLET ORAL
Status: COMPLETED | OUTPATIENT
Start: 2023-12-02 | End: 2023-12-02

## 2023-12-02 RX ADMIN — ASPIRIN 325 MG ORAL TABLET 325 MG: 325 PILL ORAL at 01:12

## 2023-12-02 RX ADMIN — CLOPIDOGREL BISULFATE 300 MG: 300 TABLET, FILM COATED ORAL at 01:12

## 2023-12-02 NOTE — ED PROVIDER NOTES
SCRIBE #1 NOTE: I, Rolando Vazquez, am scribing for, and in the presence of, Diogo Everett MD. I have scribed the entire note.       History     Chief Complaint   Patient presents with    Chest Pain     Pt presents to ED with SS CP that started this am. Unable to describe 2/10 pain non-radiating. Extensive cardiac and DM hx. Off several meds for 2 wks.     HPI  12/2/2023, 1:14 AM  History obtained from the patient    HPI:  Peewee Soto Jr. is a 38 y.o. male with a PMH of CAD, DM, GERD, NSTEMI, s/p coronary artery stent placement who presents to the [facility] emergency department for evaluation of left sided CP which onset suddenly yesterday morning while driving to work. Patient describes having intermittent episodes of CP that would last a few seconds each time. Patient notes moving his arm and lifting objects caused an episode of CP. Patient denies any smoking hx but states his aunt that passed away due to a cardiac history in her 30s. Patient states he is out of plavix, atorvastatin, and losartan and have not been able to see his cardiologist for 2 years. No other complaints or concerns.     Arrival mode: Personal vehicle    PCP: No, Primary Doctor    Review of patient's allergies indicates:   Allergen Reactions    Pcn [penicillins] Rash      Past Medical History:   Diagnosis Date    Coronary artery disease involving native coronary artery of native heart     Diabetes mellitus     Essential hypertension 3/29/2019    GERD (gastroesophageal reflux disease)     Mixed hyperlipidemia 11/30/2020    NSTEMI (non-ST elevated myocardial infarction)     S/P coronary artery stent placement     Undescended testicle before puberty      Past Surgical History:   Procedure Laterality Date    APPENDECTOMY N/A 11/11/2015    Procedure: APPENDECTOMY ;  Surgeon: Osvaldo Guy MD;  Location: [facility];  Service: General;  Laterality: N/A;    CORONARY ANGIOPLASTY      CORONARY STENT PLACEMENT      HERNIA REPAIR       LEFT HEART CATHETERIZATION Left 3/18/2019    Procedure: CATHETERIZATION, HEART, LEFT;  Surgeon: Bill Moreno MD;  Location: [facility] [place] CATH LAB;  Service: Cardiology;  Laterality: Left;       Family History   Problem Relation Age of Onset    Diabetes Mother     Hypertension Father     Heart disease Maternal Grandmother      Social History     Tobacco Use    Smoking status: Never    Smokeless tobacco: Never   Substance and Sexual Activity    Alcohol use: No    Drug use: No    Sexual activity: Never      Review of Systems     Review of Systems   Constitutional: Negative.    HENT: Negative.     Eyes: Negative.    Respiratory: Negative.  Negative for shortness of breath.    Cardiovascular:  Positive for chest pain (intermittent; left sided).   Gastrointestinal: Negative.  Negative for nausea.   Endocrine: Negative.    Genitourinary: Negative.    Musculoskeletal: Negative.    Skin: Negative.    Allergic/Immunologic: Negative.    Neurological: Negative.    Hematological: Negative.    Psychiatric/Behavioral: Negative.            Physical Exam     Initial Vitals [12/01/23 2312]   BP Pulse Resp Temp SpO2   (!) 165/96 78 18 98.1 °F (36.7 °C) 98 %      MAP       --          Physical Exam  Nursing notes and vital signs reviewed.  Constitutional: Patient is in no distress.   Head: Normocephalic. Atraumatic.   Eyes: Conjunctivae are not pale. No scleral icterus.   ENT: Mucous membranes moist.   Neck: Supple.   Cardiovascular: Regular rate. Regular rhythm.   Pulmonary: No respiratory distress. Lungs clear to auscultation  Abdominal: Non-distended. No murmurs  Musculoskeletal: Moves all extremities. No obvious deformities. No edema.   Skin: Warm and dry.   Neurological:  Alert, awake, and appropriate. Normal speech. No acute lateralizing neurologic deficits appreciated.   Psychiatric: Normal affect.       ED Course   Procedures  Vitals:    12/01/23 2312 12/01/23 2335 12/02/23 0003 12/02/23 0032   BP: (!) 165/96 (!) 148/92 (!) 143/91  "(!) 164/82   Pulse: 78 77 76 82   Resp: 18 20 20 17   Temp: 98.1 °F (36.7 °C)      TempSrc: Oral      SpO2: 98% 96% 97% 97%   Weight: 69.2 kg (152 lb 8.9 oz)      Height: 5' 3" (1.6 m)       12/02/23 0105   BP: 128/74   Pulse: 79   Resp: 20   Temp:    TempSrc:    SpO2: 95%   Weight:    Height:        Lab Results Interpreted as Abnormal:  Labs Reviewed   CBC W/ AUTO DIFFERENTIAL - Abnormal; Notable for the following components:       Result Value    MCH 32.0 (*)     MPV 8.7 (*)     All other components within normal limits    Narrative:     Release to patient->Immediate   COMPREHENSIVE METABOLIC PANEL - Abnormal; Notable for the following components:    Glucose 148 (*)     ALT 53 (*)     All other components within normal limits    Narrative:     Release to patient->Immediate   POCT GLUCOSE - Abnormal; Notable for the following components:    POCT Glucose 131 (*)     All other components within normal limits   TROPONIN I    Narrative:     Release to patient->Immediate   B-TYPE NATRIURETIC PEPTIDE    Narrative:     Release to patient->Immediate   HIV 1 / 2 ANTIBODY    Narrative:     Release to patient->Immediate   HEPATITIS C ANTIBODY    Narrative:     Release to patient->Immediate   HEP C VIRUS HOLD SPECIMEN    Narrative:     Release to patient->Immediate        All Lab Results:  Results for orders placed or performed during the hospital encounter of 12/01/23   CBC auto differential   Result Value Ref Range    WBC 8.41 3.90 - 12.70 K/uL    RBC 5.03 4.60 - 6.20 M/uL    Hemoglobin 16.1 14.0 - 18.0 g/dL    Hematocrit 48.0 40.0 - 54.0 %    MCV 95 82 - 98 fL    MCH 32.0 (H) 27.0 - 31.0 pg    MCHC 33.5 32.0 - 36.0 g/dL    RDW 11.6 11.5 - 14.5 %    Platelets 311 150 - 450 K/uL    MPV 8.7 (L) 9.2 - 12.9 fL    Immature Granulocytes 0.4 0.0 - 0.5 %    Gran # (ANC) 3.8 1.8 - 7.7 K/uL    Immature Grans (Abs) 0.03 0.00 - 0.04 K/uL    Lymph # 3.5 1.0 - 4.8 K/uL    Mono # 0.7 0.3 - 1.0 K/uL    Eos # 0.4 0.0 - 0.5 K/uL    Baso # " 0.05 0.00 - 0.20 K/uL    nRBC 0 0 /100 WBC    Gran % 44.8 38.0 - 73.0 %    Lymph % 41.6 18.0 - 48.0 %    Mono % 8.2 4.0 - 15.0 %    Eosinophil % 4.4 0.0 - 8.0 %    Basophil % 0.6 0.0 - 1.9 %    Differential Method Automated    Comprehensive metabolic panel   Result Value Ref Range    Sodium 141 136 - 145 mmol/L    Potassium 3.9 3.5 - 5.1 mmol/L    Chloride 103 95 - 110 mmol/L    CO2 25 23 - 29 mmol/L    Glucose 148 (H) 70 - 110 mg/dL    BUN 14 6 - 20 mg/dL    Creatinine 0.9 0.5 - 1.4 mg/dL    Calcium 9.8 8.7 - 10.5 mg/dL    Total Protein 7.1 6.0 - 8.4 g/dL    Albumin 4.1 3.5 - 5.2 g/dL    Total Bilirubin 0.3 0.1 - 1.0 mg/dL    Alkaline Phosphatase 79 55 - 135 U/L    AST 21 10 - 40 U/L    ALT 53 (H) 10 - 44 U/L    eGFR >60 >60 mL/min/1.73 m^2    Anion Gap 13 8 - 16 mmol/L   Troponin I #1   Result Value Ref Range    Troponin I 0.009 0.000 - 0.026 ng/mL   BNP   Result Value Ref Range    BNP <10 0 - 99 pg/mL   HIV 1/2 Ag/Ab (4th Gen)   Result Value Ref Range    HIV 1/2 Ag/Ab Negative Negative   Hepatitis C Antibody   Result Value Ref Range    Hepatitis C Ab Negative Negative   HCV Virus Hold Specimen   Result Value Ref Range    HEP C Virus Hold Specimen Hold for HCV sendout    POCT glucose   Result Value Ref Range    POCT Glucose 131 (H) 70 - 110 mg/dL         Imaging Results              X-Ray Chest AP Portable (Final result)  Result time 12/02/23 00:04:06      Final result by Mundo Manzanares MD (12/02/23 00:04:06)                   Impression:      No acute abnormality.      Electronically signed by: Mundo Manzanares  Date:    12/02/2023  Time:    00:04               Narrative:    EXAMINATION:  XR CHEST AP PORTABLE    CLINICAL HISTORY:  Chest Pain;    TECHNIQUE:  Single frontal view of the chest was performed.    COMPARISON:  Multiple priors.    FINDINGS:  The lungs are clear, with normal appearance of pulmonary vasculature and no pleural effusion or pneumothorax.    The cardiac silhouette is normal in size. The hilar  and mediastinal contours are unremarkable.    Bones are intact.                                     ED Physician's independent review of the above imaging: agree with radiologist    The EKG was ordered, reviewed, and independently interpreted by the ED Physician:  Interpretation time: 23:13  Rate: 88 bpm  Rhythm: normal sinus rhythm  Interpretation: Septal infarct. No STEMI.      The emergency physician reviewed the vital signs and test results, which are outlined above.     ED Discussion     1:21 AM: Patient's evaluation in the ED does not suggest any emergent or life-threatening medical conditions requiring immediate intervention beyond what was provided in the ED, and I believe patient is safe for discharge.  Regardless, an unremarkable evaluation in the ED does not preclude the development or presence of a serious or life-threatening condition. As such, patient was given return instructions for any change or worsening of symptoms.     Medical Decision Making:   Clinical Tests:   Lab Tests: Ordered and Reviewed  Radiological Study: Ordered and Reviewed  Medical Tests: Ordered and Reviewed         ED Medication(s):  Medications   clopidogreL tablet 300 mg (300 mg Oral Given 12/2/23 0126)   aspirin tablet 325 mg (325 mg Oral Given 12/2/23 0126)     Discharge Medication List as of 12/2/2023  1:20 AM        Prescription Management: I performed a review of the patient's current Rx medication list as input by nursing staff.     Follow-up Information       Rosalba Werner FNP-C. Schedule an appointment as soon as possible for a visit in 2 days.    Specialty: Cardiology  Contact information:  [address] [place] [address]  [phone]               [facility] - Emergency Dept..    Specialty: Emergency Medicine  Why: As needed, If symptoms worsen  Contact information:  [address]  [facility] [id number] [phone]                           Scribe Attestation:   Scribe #1: I  performed the above scribed service and the documentation accurately describes the services I performed. I attest to the accuracy of the note.     Attending:   Physician Attestation Statement for Scribe #1: I, Diogo Everett MD, personally performed the services described in this documentation, as scribed by Rolando Vazquez, in my presence, and it is both accurate and complete. As with other dictation methods such as dictation software, small errors or inconsistencies may be overlooked due to the goal of spending more face-to-face time with patients.      Clinical Impression       ICD-10-CM ICD-9-CM   1. Chest pain  R07.9 786.50      ED Disposition Condition    Discharge Stable               Diogo Everett MD  12/05/23 0314

## 2023-12-02 NOTE — FIRST PROVIDER EVALUATION
Medical screening examination initiated.  I have conducted a focused provider triage encounter, findings are as follows:    Brief history of present illness:  pt presents with chest pain    There were no vitals filed for this visit.    Pertinent physical exam:  nad    Brief workup plan:  labs, EKG, imaging, further eval    Preliminary workup initiated; this workup will be continued and followed by the physician or advanced practice provider that is assigned to the patient when roomed.   20

## 2023-12-31 ENCOUNTER — HOSPITAL ENCOUNTER (EMERGENCY)
Facility: HOSPITAL | Age: 38
Discharge: HOME | End: 2023-12-31
Attending: EMERGENCY MEDICINE
Payer: MEDICAID

## 2023-12-31 VITALS
TEMPERATURE: 100 F | BODY MASS INDEX: 26.63 KG/M2 | HEART RATE: 122 BPM | DIASTOLIC BLOOD PRESSURE: 92 MMHG | SYSTOLIC BLOOD PRESSURE: 178 MMHG | OXYGEN SATURATION: 96 % | RESPIRATION RATE: 18 BRPM | WEIGHT: 150.38 LBS

## 2023-12-31 DIAGNOSIS — J10.1 INFLUENZA A: Primary | ICD-10-CM

## 2023-12-31 LAB
INFLUENZA A, MOLECULAR: POSITIVE
INFLUENZA B, MOLECULAR: NEGATIVE
SARS-COV-2 RDRP RESP QL NAA+PROBE: NEGATIVE
SPECIMEN SOURCE: ABNORMAL

## 2023-12-31 PROCEDURE — 99284 EMERGENCY DEPT VISIT MOD MDM: CPT

## 2023-12-31 PROCEDURE — 25000003 PHARM REV CODE 250: Performed by: NURSE PRACTITIONER

## 2023-12-31 PROCEDURE — 87502 INFLUENZA DNA AMP PROBE: CPT | Performed by: NURSE PRACTITIONER

## 2023-12-31 PROCEDURE — U0002 COVID-19 LAB TEST NON-CDC: HCPCS | Performed by: NURSE PRACTITIONER

## 2023-12-31 RX ORDER — PROMETHAZINE HYDROCHLORIDE AND DEXTROMETHORPHAN HYDROBROMIDE 6.25; 15 MG/5ML; MG/5ML
5 SYRUP ORAL EVERY 6 HOURS PRN
Qty: 150 ML | Refills: 0 | Status: SHIPPED | OUTPATIENT
Start: 2023-12-31 | End: 2024-01-10

## 2023-12-31 RX ORDER — IBUPROFEN 800 MG/1
800 TABLET ORAL
Status: COMPLETED | OUTPATIENT
Start: 2023-12-31 | End: 2023-12-31

## 2023-12-31 RX ORDER — ONDANSETRON 4 MG/1
4 TABLET, ORALLY DISINTEGRATING ORAL EVERY 6 HOURS PRN
Qty: 12 TABLET | Refills: 0 | Status: SHIPPED | OUTPATIENT
Start: 2023-12-31

## 2023-12-31 RX ORDER — OSELTAMIVIR PHOSPHATE 75 MG/1
75 CAPSULE ORAL 2 TIMES DAILY
Qty: 10 CAPSULE | Refills: 0 | Status: SHIPPED | OUTPATIENT
Start: 2023-12-31 | End: 2024-01-05

## 2023-12-31 RX ADMIN — IBUPROFEN 800 MG: 800 TABLET, FILM COATED ORAL at 06:12

## 2023-12-31 NOTE — Clinical Note
"Peewee"Geoff Soto was seen and treated in our emergency department on 12/31/2023.  He may return to work on 01/04/2024.       If you have any questions or concerns, please don't hesitate to call.      Jimenez Tinsley Jr., SHERRIP"

## 2024-01-01 NOTE — ED PROVIDER NOTES
Encounter Date: 12/31/2023       History     Chief Complaint   Patient presents with    Cough     Pt c/o cough that began last night and possibly a fever.  He also c/o HA.     38-year-old male presents emergency department with complaints of cough, headache and subjective fever that began last night.  Associated symptoms include vomiting.  Patient denies any abdominal pain, chest pain, shortness of breath or any other symptoms.    The history is provided by the patient.     Review of patient's allergies indicates:   Allergen Reactions    Pcn [penicillins] Rash     Past Medical History:   Diagnosis Date    Coronary artery disease involving native coronary artery of native heart     Diabetes mellitus     Essential hypertension 3/29/2019    GERD (gastroesophageal reflux disease)     Mixed hyperlipidemia 11/30/2020    NSTEMI (non-ST elevated myocardial infarction)     S/P coronary artery stent placement     Undescended testicle before puberty      Past Surgical History:   Procedure Laterality Date    APPENDECTOMY N/A 11/11/2015    Procedure: APPENDECTOMY ;  Surgeon: Osvaldo Guy MD;  Location: [facility] OR;  Service: General;  Laterality: N/A;    CORONARY ANGIOPLASTY      CORONARY STENT PLACEMENT      HERNIA REPAIR      LEFT HEART CATHETERIZATION Left 3/18/2019    Procedure: CATHETERIZATION, HEART, LEFT;  Surgeon: Bill Moreno MD;  Location: [facility] CATH LAB;  Service: Cardiology;  Laterality: Left;     Family History   Problem Relation Age of Onset    Diabetes Mother     Hypertension Father     Heart disease Maternal Grandmother      Social History     Tobacco Use    Smoking status: Never    Smokeless tobacco: Never   Substance Use Topics    Alcohol use: No    Drug use: No     Review of Systems   Constitutional:  Positive for activity change, appetite change, chills, fatigue and fever.   HENT:  Positive for congestion. Negative for sore throat.    Respiratory:  Positive for cough. Negative for shortness of breath.     Cardiovascular:  Negative for chest pain.   Gastrointestinal:  Positive for vomiting. Negative for nausea.   Genitourinary:  Negative for dysuria.   Musculoskeletal:  Positive for myalgias. Negative for back pain.   Skin:  Negative for rash.   Neurological:  Negative for weakness.   Hematological:  Does not bruise/bleed easily.   All other systems reviewed and are negative.      Physical Exam     Initial Vitals [12/31/23 1816]   BP Pulse Resp Temp SpO2   (!) 178/92 (!) 122 18 (!) 100.4 °F (38 °C) 96 %      MAP       --         Physical Exam    Constitutional: He appears well-developed and well-nourished. No distress.   HENT:   Head: Normocephalic and atraumatic.   Nose: Nose normal.   Mouth/Throat: Uvula is midline and oropharynx is clear and moist.   Eyes: Conjunctivae and EOM are normal. Pupils are equal, round, and reactive to light.   Neck: Neck supple.   Normal range of motion.  Cardiovascular:  Normal rate and regular rhythm.           Pulmonary/Chest: Effort normal and breath sounds normal. No respiratory distress. He has no decreased breath sounds. He has no wheezes. He has no rales.   Abdominal: Abdomen is soft. Bowel sounds are normal. There is no abdominal tenderness.   Musculoskeletal:         General: Normal range of motion.      Cervical back: Normal range of motion and neck supple.     Neurological: He is alert and oriented to person, place, and time. He has normal strength. GCS eye subscore is 4. GCS verbal subscore is 5. GCS motor subscore is 6.   Skin: Skin is warm and dry. Capillary refill takes less than 2 seconds. No rash noted.   Psychiatric: He has a normal mood and affect. His speech is normal and behavior is normal.         ED Course   Procedures  Labs Reviewed   INFLUENZA A & B BY MOLECULAR - Abnormal; Notable for the following components:       Result Value    Influenza A, Molecular Positive (*)     All other components within normal limits   SARS-COV-2 RNA AMPLIFICATION, QUAL           Imaging Results    None          Medications   ibuprofen tablet 800 mg (800 mg Oral Given 12/31/23 9210)     Medical Decision Making  Amount and/or Complexity of Data Reviewed  Labs: ordered.     Details: Influenza positive    Risk  Prescription drug management.  Risk Details: Prescriptions written.  Follow up with primary care.                                      Clinical Impression:  Final diagnoses:  [J10.1] Influenza A (Primary)          ED Disposition Condition    Discharge Stable          ED Prescriptions       Medication Sig Dispense Start Date End Date Auth. Provider    promethazine-dextromethorphan (PROMETHAZINE-DM) 6.25-15 mg/5 mL Syrp Take 5 mLs by mouth every 6 (six) hours as needed (Cough). 150 mL 12/31/2023 1/10/2024 Jimenez Tinsley Jr., FNP    oseltamivir (TAMIFLU) 75 MG capsule Take 1 capsule (75 mg total) by mouth 2 (two) times daily. for 5 days 10 capsule 12/31/2023 1/5/2024 Jimenez Tinsley Jr., FNP    ondansetron (ZOFRAN-ODT) 4 MG TbDL Take 1 tablet (4 mg total) by mouth every 6 (six) hours as needed (vomiting). 12 tablet 12/31/2023 -- Jimenez Tinsley Jr., FNP          Follow-up Information       Follow up With Specialties Details Why Contact Info    [facility] - Emergency Dept. Emergency Medicine  If symptoms worsen [id number] [facility] [id number] [phone]             Jimenez Tinsley Jr., FNP  12/31/23 3746

## 2024-01-01 NOTE — FIRST PROVIDER EVALUATION
Medical screening examination initiated.  I have conducted a focused provider triage encounter, findings are as follows:    Brief history of present illness:  Patient presents with headache, body aches, cough and subjective fever.  Onset was last night.    Vitals:    12/31/23 1816   BP: (!) 178/92   BP Location: Right arm   Patient Position: Sitting   Pulse: (!) 122   Resp: 18   Temp: (!) 100.4 °F (38 °C)   TempSrc: Oral   SpO2: 96%   Weight: 68.2 kg (150 lb 5.7 oz)       Pertinent physical exam:  Febrile, tachycardic    Brief workup plan:  COVID, flu    Preliminary workup initiated; this workup will be continued and followed by the physician or advanced practice provider that is assigned to the patient when roomed.

## 2024-01-06 DIAGNOSIS — E11.65 UNCONTROLLED TYPE 2 DIABETES MELLITUS WITH HYPERGLYCEMIA: ICD-10-CM

## 2024-01-08 RX ORDER — METFORMIN HYDROCHLORIDE 1000 MG/1
1000 TABLET ORAL 2 TIMES DAILY WITH MEALS
Qty: 180 TABLET | Refills: 0 | Status: SHIPPED | OUTPATIENT
Start: 2024-01-08

## 2024-02-05 ENCOUNTER — TELEPHONE (OUTPATIENT)
Dept: CARDIOLOGY | Facility: CLINIC | Age: 39
End: 2024-02-05
Payer: MEDICAID

## 2024-02-05 NOTE — TELEPHONE ENCOUNTER
Called patient to get scheduled for a follow up appt with provider. Couldn't schedule appt due to type of insurance. Ms Vega will schedule appt and I will call patient back to confirm date and time. HUA

## 2024-02-09 NOTE — PROGRESS NOTES
Subjective:   Patient ID:  Peewee Soto Jr. is a 38 y.o. male who presents for evaluation of No chief complaint on file.        HPI    Peewee Soto Jr. is a 35 year old male who presents to clinic for BP check. His current medical conditions include CAD s/p PCI of LCX and OM1, HTN, HLP, Uncontrolled DM, Obesity. He returns today and states he is doing ok.     He has right wrist in splint today due to recent sprain. Denies any chest pain or anginal equivalents. BP still elevated today but is improved from last visit.     Denies chest pain or anginal equivalents. No shortness of breath, VARGAS or palpitations. Denies orthopnea, PND or abdominal bloating. Reports regular walking without any issues lately. NO leg swelling or claudications. No recent falls, syncope or near syncopal events. Reports compliance with medications and dietary restrictions. NO CNS complaints to suggest TIA or CVA today. No signs of abnormal bleeding on ASA and Plavix.     4/28/2021 update:    He returns to cardiology clinic today for 3 month follow up. He reports some episodes of chest soreness at the end of the day. BP is well controlled today in office. Reports compliance with medications. He does endorse noncompliance with dietary restrictions. He is eating more frozen meals lately since he is living by himself.     NO chest pain or anginal equivalents today in office. No shortness of breath, VARGAS or palpitations. Denies any formal exercise program. He is working at [employer]. NO leg swelling or claudications. NO signs of abnormal bleeding on ASA and Plavix.     3/10/2022 update    He returns today and states he is doing well cardiac wise.   Denies any chest pain or anginal equivalents. No shortness of breath, VARGAS or palpitations.   Has not been compliant with diabetic diet recently.   Denies orthopnea, PND or abdominal bloating.   NO leg swelling or claudications.     CMP, Lipids, BNP, A1C pending.     Reports  compliance with meds and dietary restrictions.     9/20/2022 update    Peewee Mariegett . Returns for follow up.     He does endorse noncompliance with dietary regimen. He is not monitoring his blood sugars.     Denies chest pain or anginal equivalents. No shortness of breath, VARGAS or palpitations. Denies orthopnea, PND or abdominal bloating. Reports regular walking without any issues lately. NO leg swelling or claudications. No recent falls, syncope or near syncopal events. Reports compliance with medications and dietary restrictions. NO CNS complaints to suggest TIA or CVA today. No signs of abnormal bleeding on ASA and Plavix.     4/6/2023 update    He returns today and states he is doing ok.     He had run out of his medications for a month or so and had some worsening chest pain symptoms.     BP well  controlled today in office.     No chest pain or anginal equivalents today in office.     Denies chest pain or anginal equivalents. No shortness of breath, VARGAS or palpitations. Denies orthopnea, PND or abdominal bloating. Reports regular walking without any issues lately. NO leg swelling or claudications. No recent falls, syncope or near syncopal events. Reports compliance with medications and dietary restrictions. NO CNS complaints to suggest TIA or CVA today. No signs of abnormal bleeding on ASA and Plavix.     Trying to do better with dietary restrictions.     2/9/2024 update    Peewee Dalyt . Returns for follow up.    He has had a few ED visits since last visit- due to chest pain and Influenza A in December 2023.    Has seen diabetes once in April 2023 and Metformin increased to 1000 mg BID  Needs A1C repeated.   Has not made follow up visit since then.     Recently started on Actos due to issues with med compliance.     Has transitioned to sugar free drinks. Was drinking 6-7 sodas per day at work.     Does endorse increased thirst and urination.     Denies chest pain or anginal  equivalents. No shortness of breath, VARGAS or palpitations. Denies orthopnea, PND or abdominal bloating. Reports regular walking without any issues lately. NO leg swelling or claudications. No recent falls, syncope or near syncopal events. Reports compliance with medications and dietary restrictions. NO CNS complaints to suggest TIA or CVA today. No signs of abnormal bleeding on ASA and Plavix.         Past Medical History:   Diagnosis Date    Coronary artery disease involving native coronary artery of native heart     Diabetes mellitus     Essential hypertension 3/29/2019    GERD (gastroesophageal reflux disease)     Mixed hyperlipidemia 11/30/2020    NSTEMI (non-ST elevated myocardial infarction)     S/P coronary artery stent placement     Undescended testicle before puberty        Past Surgical History:   Procedure Laterality Date    APPENDECTOMY N/A 11/11/2015    Procedure: APPENDECTOMY ;  Surgeon: Osvaldo Guy MD;  Location: [facility] OR;  Service: General;  Laterality: N/A;    CORONARY ANGIOPLASTY      CORONARY STENT PLACEMENT      HERNIA REPAIR      LEFT HEART CATHETERIZATION Left 3/18/2019    Procedure: CATHETERIZATION, HEART, LEFT;  Surgeon: Bill Moreno MD;  Location: [facility] CATH LAB;  Service: Cardiology;  Laterality: Left;       Social History     Tobacco Use    Smoking status: Never    Smokeless tobacco: Never   Substance Use Topics    Alcohol use: No    Drug use: No       Family History   Problem Relation Age of Onset    Diabetes Mother     Hypertension Father     Heart disease Maternal Grandmother      Wt Readings from Last 3 Encounters:   02/12/24 69 kg (152 lb 1.9 oz)   12/31/23 68.2 kg (150 lb 5.7 oz)   12/01/23 69.2 kg (152 lb 8.9 oz)     Temp Readings from Last 3 Encounters:   12/31/23 (!) 100.4 °F (38 °C) (Oral)   12/01/23 98.1 °F (36.7 °C) (Oral)   03/13/23 98.2 °F (36.8 °C) (Oral)     BP Readings from Last 3 Encounters:   02/12/24 130/80   12/31/23 (!) 178/92   12/02/23 128/74     Pulse Readings from  Last 3 Encounters:   02/12/24 107   12/31/23 (!) 122   12/02/23 79     Current Outpatient Medications on File Prior to Visit   Medication Sig Dispense Refill    aspirin 81 MG Chew Take 1 tablet (81 mg total) by mouth once daily. 30 tablet 11    atorvastatin (LIPITOR) 40 MG tablet Take 1 tablet (40 mg total) by mouth once daily. 30 tablet 0    clopidogreL (PLAVIX) 75 mg tablet Take 1 tablet (75 mg total) by mouth once daily. 30 tablet 0    JARDIANCE 25 mg tablet Take 1 tablet by mouth once daily 90 tablet 0    losartan (COZAAR) 25 MG tablet Take 1 tablet (25 mg total) by mouth once daily. 30 tablet 0    metFORMIN (GLUCOPHAGE) 1000 MG tablet TAKE 1 TABLET BY MOUTH TWICE DAILY WITH MEALS 180 tablet 0    metoprolol succinate (TOPROL-XL) 100 MG 24 hr tablet Take 1 tablet (100 mg total) by mouth once daily. 30 tablet 3    ondansetron (ZOFRAN-ODT) 4 MG TbDL Take 1 tablet (4 mg total) by mouth every 6 (six) hours as needed (vomiting). 12 tablet 0    pioglitazone (ACTOS) 15 MG tablet Take 15 mg by mouth once daily.       No current facility-administered medications on file prior to visit.       Review of Systems   Constitutional: Positive for malaise/fatigue.   HENT:  Negative for hearing loss and hoarse voice.    Eyes:  Negative for blurred vision and visual disturbance.   Cardiovascular:  Negative for chest pain, claudication, dyspnea on exertion, irregular heartbeat, leg swelling, near-syncope, orthopnea, palpitations, paroxysmal nocturnal dyspnea and syncope.   Respiratory:  Negative for cough, hemoptysis, shortness of breath, sleep disturbances due to breathing, snoring and wheezing.    Endocrine: Negative for cold intolerance and heat intolerance.   Hematologic/Lymphatic: Bruises/bleeds easily.   Skin:  Negative for color change, dry skin and nail changes.   Musculoskeletal:  Positive for arthritis. Negative for back pain, joint pain and myalgias.   Gastrointestinal:  Negative for bloating, abdominal pain,  constipation, nausea and vomiting.   Genitourinary:  Negative for dysuria, flank pain, hematuria and hesitancy.   Neurological:  Negative for headaches, light-headedness, loss of balance, numbness, paresthesias and weakness.   Psychiatric/Behavioral:  Negative for altered mental status.    Allergic/Immunologic: Negative for environmental allergies.     /80 (BP Location: Left arm, Patient Position: Sitting)   Pulse 107   Wt 69 kg (152 lb 1.9 oz)   SpO2 99%   BMI 26.95 kg/m²     Objective:   Physical Exam  Vitals and nursing note reviewed.   Constitutional:       General: He is not in acute distress.     Appearance: He is well-developed. He is not diaphoretic.   HENT:      Head: Normocephalic and atraumatic.   Eyes:      Pupils: Pupils are equal, round, and reactive to light.   Neck:      Vascular: No JVD.   Cardiovascular:      Rate and Rhythm: Normal rate and regular rhythm.      Chest Wall: PMI is not displaced.      Pulses: Intact distal pulses.           Radial pulses are 2+ on the right side and 2+ on the left side.        Dorsalis pedis pulses are 2+ on the right side and 2+ on the left side.      Heart sounds: S1 normal and S2 normal. Heart sounds not distant. No murmur heard.     Comments: CHEST PAIN FREE  Pulmonary:      Effort: Pulmonary effort is normal. No accessory muscle usage or respiratory distress.      Breath sounds: Normal breath sounds. No decreased breath sounds, wheezing or rales.   Abdominal:      General: Bowel sounds are normal. There is no distension.      Palpations: Abdomen is soft.      Tenderness: There is no abdominal tenderness.   Musculoskeletal:         General: Normal range of motion.      Cervical back: Full passive range of motion without pain, normal range of motion and neck supple.      Right ankle: No swelling.      Left ankle: No swelling.   Skin:     General: Skin is warm and dry.      Nails: There is no clubbing.   Neurological:      Mental Status: He is alert and  oriented to person, place, and time.   Psychiatric:         Speech: Speech normal.         Behavior: Behavior normal.         Thought Content: Thought content normal.         Judgment: Judgment normal.         Lab Results   Component Value Date    CHOL 142 04/13/2023    CHOL 110 (L) 03/10/2022    CHOL 112 (L) 10/11/2021     Lab Results   Component Value Date    HDL 43 04/13/2023    HDL 42 03/10/2022    HDL 42 10/11/2021     Lab Results   Component Value Date    LDLCALC 67.2 04/13/2023    LDLCALC 40.8 (L) 03/10/2022    LDLCALC 50.6 (L) 10/11/2021     Lab Results   Component Value Date    TRIG 159 (H) 04/13/2023    TRIG 136 03/10/2022    TRIG 97 10/11/2021     Lab Results   Component Value Date    CHOLHDL 30.3 04/13/2023    CHOLHDL 38.2 03/10/2022    CHOLHDL 37.5 10/11/2021       Chemistry        Component Value Date/Time     12/01/2023 2334    K 3.9 12/01/2023 2334     12/01/2023 2334    CO2 25 12/01/2023 2334    BUN 14 12/01/2023 2334    CREATININE 0.9 12/01/2023 2334     (H) 12/01/2023 2334        Component Value Date/Time    CALCIUM 9.8 12/01/2023 2334    ALKPHOS 79 12/01/2023 2334    AST 21 12/01/2023 2334    ALT 53 (H) 12/01/2023 2334    BILITOT 0.3 12/01/2023 2334    ESTGFRAFRICA >60 03/10/2022 0942    EGFRNONAA >60 03/10/2022 0942          Lab Results   Component Value Date    TSH 1.109 10/21/2016     Lab Results   Component Value Date    INR 0.9 03/21/2019    INR 0.9 03/18/2019    INR 1.0 11/11/2015     Lab Results   Component Value Date    WBC 8.41 12/01/2023    HGB 16.1 12/01/2023    HCT 48.0 12/01/2023    MCV 95 12/01/2023     12/01/2023        Assessment:      1. Coronary artery disease of native artery of native heart with stable angina pectoris    2. S/P coronary artery stent placement    3. Essential hypertension    4. Mixed hyperlipidemia    5. Sinus tachycardia    6. Diabetes mellitus without complication    7. Uncontrolled type 2 diabetes mellitus with hyperglycemia               Plan:   Labs today-call with results.     Continue same meds for now  Dash diet 2 gm sodium restriction  Encourage daily walking  Profile BP at home  RTC In 6 months with ECHO    MIKE Foster  [facility]

## 2024-02-12 ENCOUNTER — OFFICE VISIT (OUTPATIENT)
Dept: CARDIOLOGY | Facility: CLINIC | Age: 39
End: 2024-02-12
Payer: MEDICAID

## 2024-02-12 ENCOUNTER — LAB VISIT (OUTPATIENT)
Dept: LAB | Facility: HOSPITAL | Age: 39
End: 2024-02-12
Attending: NURSE PRACTITIONER
Payer: MEDICAID

## 2024-02-12 VITALS
BODY MASS INDEX: 26.95 KG/M2 | SYSTOLIC BLOOD PRESSURE: 130 MMHG | DIASTOLIC BLOOD PRESSURE: 80 MMHG | WEIGHT: 152.13 LBS | HEART RATE: 107 BPM | OXYGEN SATURATION: 99 %

## 2024-02-12 DIAGNOSIS — I25.118 CORONARY ARTERY DISEASE OF NATIVE ARTERY OF NATIVE HEART WITH STABLE ANGINA PECTORIS: ICD-10-CM

## 2024-02-12 DIAGNOSIS — E11.65 UNCONTROLLED TYPE 2 DIABETES MELLITUS WITH HYPERGLYCEMIA: ICD-10-CM

## 2024-02-12 DIAGNOSIS — I25.118 CORONARY ARTERY DISEASE OF NATIVE ARTERY OF NATIVE HEART WITH STABLE ANGINA PECTORIS: Primary | ICD-10-CM

## 2024-02-12 DIAGNOSIS — Z95.5 S/P CORONARY ARTERY STENT PLACEMENT: ICD-10-CM

## 2024-02-12 DIAGNOSIS — I10 ESSENTIAL HYPERTENSION: ICD-10-CM

## 2024-02-12 DIAGNOSIS — E11.9 DIABETES MELLITUS WITHOUT COMPLICATION: ICD-10-CM

## 2024-02-12 DIAGNOSIS — R00.0 SINUS TACHYCARDIA: ICD-10-CM

## 2024-02-12 DIAGNOSIS — E11.649 UNCONTROLLED TYPE 2 DIABETES MELLITUS WITH HYPOGLYCEMIA WITHOUT COMA: ICD-10-CM

## 2024-02-12 DIAGNOSIS — E78.2 MIXED HYPERLIPIDEMIA: ICD-10-CM

## 2024-02-12 LAB
ALBUMIN SERPL BCP-MCNC: 4.3 G/DL (ref 3.5–5.2)
ALP SERPL-CCNC: 103 U/L (ref 55–135)
ALT SERPL W/O P-5'-P-CCNC: 48 U/L (ref 10–44)
ANION GAP SERPL CALC-SCNC: 13 MMOL/L (ref 8–16)
AST SERPL-CCNC: 27 U/L (ref 10–40)
BILIRUB SERPL-MCNC: 0.4 MG/DL (ref 0.1–1)
BUN SERPL-MCNC: 15 MG/DL (ref 6–20)
CALCIUM SERPL-MCNC: 10.3 MG/DL (ref 8.7–10.5)
CHLORIDE SERPL-SCNC: 104 MMOL/L (ref 95–110)
CHOLEST SERPL-MCNC: 126 MG/DL (ref 120–199)
CHOLEST/HDLC SERPL: 3 {RATIO} (ref 2–5)
CO2 SERPL-SCNC: 22 MMOL/L (ref 23–29)
CREAT SERPL-MCNC: 0.9 MG/DL (ref 0.5–1.4)
EST. GFR  (NO RACE VARIABLE): >60 ML/MIN/1.73 M^2
ESTIMATED AVG GLUCOSE: 280 MG/DL (ref 68–131)
GLUCOSE SERPL-MCNC: 138 MG/DL (ref 70–110)
HBA1C MFR BLD: 11.4 % (ref 4–5.6)
HDLC SERPL-MCNC: 42 MG/DL (ref 40–75)
HDLC SERPL: 33.3 % (ref 20–50)
LDLC SERPL CALC-MCNC: 49.2 MG/DL (ref 63–159)
NONHDLC SERPL-MCNC: 84 MG/DL
POTASSIUM SERPL-SCNC: 4.4 MMOL/L (ref 3.5–5.1)
PROT SERPL-MCNC: 7.4 G/DL (ref 6–8.4)
SODIUM SERPL-SCNC: 139 MMOL/L (ref 136–145)
TRIGL SERPL-MCNC: 174 MG/DL (ref 30–150)

## 2024-02-12 PROCEDURE — 80053 COMPREHEN METABOLIC PANEL: CPT | Performed by: NURSE PRACTITIONER

## 2024-02-12 PROCEDURE — 3079F DIAST BP 80-89 MM HG: CPT | Mod: CPTII,,, | Performed by: NURSE PRACTITIONER

## 2024-02-12 PROCEDURE — 99214 OFFICE O/P EST MOD 30 MIN: CPT | Mod: S$PBB,,, | Performed by: NURSE PRACTITIONER

## 2024-02-12 PROCEDURE — 4010F ACE/ARB THERAPY RXD/TAKEN: CPT | Mod: CPTII,,, | Performed by: NURSE PRACTITIONER

## 2024-02-12 PROCEDURE — 99999 PR PBB SHADOW E&M-EST. PATIENT-LVL III: CPT | Mod: PBBFAC,,, | Performed by: NURSE PRACTITIONER

## 2024-02-12 PROCEDURE — 83036 HEMOGLOBIN GLYCOSYLATED A1C: CPT | Performed by: NURSE PRACTITIONER

## 2024-02-12 PROCEDURE — 36415 COLL VENOUS BLD VENIPUNCTURE: CPT | Performed by: NURSE PRACTITIONER

## 2024-02-12 PROCEDURE — 99213 OFFICE O/P EST LOW 20 MIN: CPT | Mod: PBBFAC | Performed by: NURSE PRACTITIONER

## 2024-02-12 PROCEDURE — 80061 LIPID PANEL: CPT | Performed by: NURSE PRACTITIONER

## 2024-02-12 PROCEDURE — 3075F SYST BP GE 130 - 139MM HG: CPT | Mod: CPTII,,, | Performed by: NURSE PRACTITIONER

## 2024-02-12 PROCEDURE — 3008F BODY MASS INDEX DOCD: CPT | Mod: CPTII,,, | Performed by: NURSE PRACTITIONER

## 2024-02-12 PROCEDURE — 1159F MED LIST DOCD IN RCRD: CPT | Mod: CPTII,,, | Performed by: NURSE PRACTITIONER

## 2024-02-12 RX ORDER — PIOGLITAZONEHYDROCHLORIDE 15 MG/1
15 TABLET ORAL DAILY
COMMUNITY
Start: 2024-02-09 | End: 2024-03-13 | Stop reason: SDUPTHER

## 2024-02-18 DIAGNOSIS — I25.118 CORONARY ARTERY DISEASE OF NATIVE ARTERY OF NATIVE HEART WITH STABLE ANGINA PECTORIS: ICD-10-CM

## 2024-02-18 DIAGNOSIS — E11.9 DIABETES MELLITUS WITHOUT COMPLICATION: ICD-10-CM

## 2024-02-19 RX ORDER — EMPAGLIFLOZIN 25 MG/1
25 TABLET, FILM COATED ORAL
Qty: 90 TABLET | Refills: 0 | OUTPATIENT
Start: 2024-02-19

## 2024-02-25 ENCOUNTER — HOSPITAL ENCOUNTER (EMERGENCY)
Facility: HOSPITAL | Age: 39
Discharge: HOME | End: 2024-02-25
Attending: EMERGENCY MEDICINE
Payer: MEDICAID

## 2024-02-25 VITALS
DIASTOLIC BLOOD PRESSURE: 79 MMHG | WEIGHT: 151.44 LBS | OXYGEN SATURATION: 97 % | BODY MASS INDEX: 26.83 KG/M2 | TEMPERATURE: 99 F | HEART RATE: 94 BPM | RESPIRATION RATE: 20 BRPM | SYSTOLIC BLOOD PRESSURE: 128 MMHG

## 2024-02-25 DIAGNOSIS — R07.9 CHEST PAIN: ICD-10-CM

## 2024-02-25 LAB
ALBUMIN SERPL BCP-MCNC: 4.2 G/DL (ref 3.5–5.2)
ALP SERPL-CCNC: 76 U/L (ref 55–135)
ALT SERPL W/O P-5'-P-CCNC: 40 U/L (ref 10–44)
ANION GAP SERPL CALC-SCNC: 10 MMOL/L (ref 8–16)
AST SERPL-CCNC: 19 U/L (ref 10–40)
BASOPHILS # BLD AUTO: 0.04 K/UL (ref 0–0.2)
BASOPHILS NFR BLD: 0.5 % (ref 0–1.9)
BILIRUB SERPL-MCNC: 0.4 MG/DL (ref 0.1–1)
BNP SERPL-MCNC: <10 PG/ML (ref 0–99)
BUN SERPL-MCNC: 17 MG/DL (ref 6–20)
CALCIUM SERPL-MCNC: 9.6 MG/DL (ref 8.7–10.5)
CHLORIDE SERPL-SCNC: 103 MMOL/L (ref 95–110)
CO2 SERPL-SCNC: 26 MMOL/L (ref 23–29)
CREAT SERPL-MCNC: 1 MG/DL (ref 0.5–1.4)
DIFFERENTIAL METHOD BLD: ABNORMAL
EOSINOPHIL # BLD AUTO: 0.1 K/UL (ref 0–0.5)
EOSINOPHIL NFR BLD: 1.6 % (ref 0–8)
ERYTHROCYTE [DISTWIDTH] IN BLOOD BY AUTOMATED COUNT: 11.7 % (ref 11.5–14.5)
EST. GFR  (NO RACE VARIABLE): >60 ML/MIN/1.73 M^2
GLUCOSE SERPL-MCNC: 212 MG/DL (ref 70–110)
HCT VFR BLD AUTO: 46.2 % (ref 40–54)
HGB BLD-MCNC: 15.4 G/DL (ref 14–18)
IMM GRANULOCYTES # BLD AUTO: 0.02 K/UL (ref 0–0.04)
IMM GRANULOCYTES NFR BLD AUTO: 0.2 % (ref 0–0.5)
LYMPHOCYTES # BLD AUTO: 2.8 K/UL (ref 1–4.8)
LYMPHOCYTES NFR BLD: 32.7 % (ref 18–48)
MCH RBC QN AUTO: 31.9 PG (ref 27–31)
MCHC RBC AUTO-ENTMCNC: 33.3 G/DL (ref 32–36)
MCV RBC AUTO: 96 FL (ref 82–98)
MONOCYTES # BLD AUTO: 0.7 K/UL (ref 0.3–1)
MONOCYTES NFR BLD: 8.3 % (ref 4–15)
NEUTROPHILS # BLD AUTO: 4.9 K/UL (ref 1.8–7.7)
NEUTROPHILS NFR BLD: 56.7 % (ref 38–73)
NRBC BLD-RTO: 0 /100 WBC
PLATELET # BLD AUTO: 308 K/UL (ref 150–450)
PMV BLD AUTO: 8.8 FL (ref 9.2–12.9)
POTASSIUM SERPL-SCNC: 4.5 MMOL/L (ref 3.5–5.1)
PROT SERPL-MCNC: 7.1 G/DL (ref 6–8.4)
RBC # BLD AUTO: 4.83 M/UL (ref 4.6–6.2)
SODIUM SERPL-SCNC: 139 MMOL/L (ref 136–145)
TROPONIN I SERPL DL<=0.01 NG/ML-MCNC: <0.006 NG/ML (ref 0–0.03)
TROPONIN I SERPL DL<=0.01 NG/ML-MCNC: <0.006 NG/ML (ref 0–0.03)
WBC # BLD AUTO: 8.66 K/UL (ref 3.9–12.7)

## 2024-02-25 PROCEDURE — 85025 COMPLETE CBC W/AUTO DIFF WBC: CPT | Performed by: NURSE PRACTITIONER

## 2024-02-25 PROCEDURE — 83880 ASSAY OF NATRIURETIC PEPTIDE: CPT | Performed by: NURSE PRACTITIONER

## 2024-02-25 PROCEDURE — 80053 COMPREHEN METABOLIC PANEL: CPT | Performed by: NURSE PRACTITIONER

## 2024-02-25 PROCEDURE — 93010 ELECTROCARDIOGRAM REPORT: CPT | Mod: ,,, | Performed by: INTERNAL MEDICINE

## 2024-02-25 PROCEDURE — 25000003 PHARM REV CODE 250: Performed by: NURSE PRACTITIONER

## 2024-02-25 PROCEDURE — 99285 EMERGENCY DEPT VISIT HI MDM: CPT | Mod: 25

## 2024-02-25 PROCEDURE — 93005 ELECTROCARDIOGRAM TRACING: CPT

## 2024-02-25 PROCEDURE — 84484 ASSAY OF TROPONIN QUANT: CPT | Performed by: NURSE PRACTITIONER

## 2024-02-25 RX ORDER — ASPIRIN 325 MG
325 TABLET ORAL
Status: COMPLETED | OUTPATIENT
Start: 2024-02-25 | End: 2024-02-25

## 2024-02-25 RX ADMIN — ASPIRIN 325 MG: 325 TABLET ORAL at 07:02

## 2024-02-26 LAB
OHS QRS DURATION: 66 MS
OHS QTC CALCULATION: 424 MS

## 2024-02-26 NOTE — DISCHARGE INSTRUCTIONS
Your workup today does not suggest a cardiac cause of her chest pain.  Please use Motrin Tylenol for pain and follow up with the cardiologist at next available possible stress test.  Return as needed for any worsening symptoms, problems, questions or concerns

## 2024-02-26 NOTE — ED PROVIDER NOTES
"SCRIBE #1 NOTE: I, Nichole Alina, am scribing for, and in the presence of, Devon Crum Jr., MD. I have scribed the entire note.       History     Chief Complaint   Patient presents with    Chest Pain     Pt reports intermittent sharp chest pain that started earlier this afternoon. Hx of MI 5 years ago. 1 stent in place. Denies N/V/SOB     Review of patient's allergies indicates:   Allergen Reactions    Pcn [penicillins] Rash         History of Present Illness     HPI    2/25/2024, 8:45 PM  History obtained from the patient      History of Present Illness: Peewee Soto Jr. is a 38 y.o. male patient with a PMHx of DM, NSTEMI, CAD who presents to the Emergency Department for evaluation of cp which onset tonight at dinner. Pt states he felt a painful "flutter". Symptoms are constant and moderate in severity. No mitigating or exacerbating factors reported. Associated sxs include palpitations. Patient denies any sob, weakness, leg swelling, dizziness, fever, and all other sxs at this time. No prior Tx reported. No further complaints or concerns at this time.       Arrival mode: Personal vehicle      PCP: Jessi, Primary Doctor        Past Medical History:  Past Medical History:   Diagnosis Date    Coronary artery disease involving native coronary artery of native heart     Diabetes mellitus     Essential hypertension 3/29/2019    GERD (gastroesophageal reflux disease)     Mixed hyperlipidemia 11/30/2020    NSTEMI (non-ST elevated myocardial infarction)     S/P coronary artery stent placement     Undescended testicle before puberty        Past Surgical History:  Past Surgical History:   Procedure Laterality Date    APPENDECTOMY N/A 11/11/2015    Procedure: APPENDECTOMY ;  Surgeon: Osvaldo Guy MD;  Location: [facility];  Service: General;  Laterality: N/A;    CORONARY ANGIOPLASTY      CORONARY STENT PLACEMENT      HERNIA REPAIR      LEFT HEART CATHETERIZATION Left 3/18/2019    Procedure: CATHETERIZATION, " HEART, LEFT;  Surgeon: Bill Moreno MD;  Location: [facility] CATH LAB;  Service: Cardiology;  Laterality: Left;         Family History:  Family History   Problem Relation Age of Onset    Diabetes Mother     Hypertension Father     Heart disease Maternal Grandmother        Social History:  Social History     Tobacco Use    Smoking status: Never    Smokeless tobacco: Never   Substance and Sexual Activity    Alcohol use: No    Drug use: No    Sexual activity: Never        Review of Systems     Review of Systems   Constitutional:  Negative for fever.   HENT:  Negative for sore throat.    Respiratory:  Negative for shortness of breath.    Cardiovascular:  Positive for chest pain and palpitations. Negative for leg swelling.   Gastrointestinal:  Negative for nausea.   Genitourinary:  Negative for dysuria.   Musculoskeletal:  Negative for back pain.   Skin:  Negative for rash.   Neurological:  Negative for dizziness and weakness.   Hematological:  Does not bruise/bleed easily.   All other systems reviewed and are negative.       Physical Exam     Initial Vitals [02/25/24 1938]   BP Pulse Resp Temp SpO2   (!) 152/79 93 18 99 °F (37.2 °C) 97 %      MAP       --          Physical Exam  Nursing Notes and Vital Signs Reviewed.  Constitutional: Patient is in no acute distress. Well-developed and well-nourished.  Head: Atraumatic. Normocephalic.  Eyes:  EOM intact.  No scleral icterus.  ENT: Mucous membranes are moist.  Nares clear   Neck:  Full ROM. No JVD.  Cardiovascular: Regular rate. Regular rhythm No murmurs, rubs, or gallops. Distal pulses are 2+ and symmetric  Pulmonary/Chest: No respiratory distress. Clear to auscultation bilaterally. No wheezing or rales.  Equal chest wall rise bilaterally  Abdominal: Soft and non-distended.  There is no tenderness.  No rebound, guarding, or rigidity. Good bowel sounds.  Genitourinary: No CVA tenderness.  No suprapubic tenderness  Musculoskeletal: Moves all extremities. No obvious  deformities.  5 x 5 strength in all extremities   Skin: Warm and dry.  Neurological:  Alert, awake, and appropriate.  Normal speech.  No acute focal neurological deficits are appreciated.  Two through 12 intact bilaterally.  Psychiatric: Normal affect. Good eye contact. Appropriate in content.       ED Course   Procedures  ED Vital Signs:  Vitals:    02/25/24 1938 02/25/24 2103 02/25/24 2105   BP: (!) 152/79 131/80    Pulse: 93  93   Resp: 18  20   Temp: 99 °F (37.2 °C)     TempSrc: Oral     SpO2: 97%  95%   Weight: 68.7 kg (151 lb 7.3 oz)         Abnormal Lab Results:  Labs Reviewed   CBC W/ AUTO DIFFERENTIAL - Abnormal; Notable for the following components:       Result Value    MCH 31.9 (*)     MPV 8.8 (*)     All other components within normal limits   COMPREHENSIVE METABOLIC PANEL - Abnormal; Notable for the following components:    Glucose 212 (*)     All other components within normal limits   TROPONIN I   B-TYPE NATRIURETIC PEPTIDE   TROPONIN I        All Lab Results:  Results for orders placed or performed during the hospital encounter of 02/25/24   CBC auto differential   Result Value Ref Range    WBC 8.66 3.90 - 12.70 K/uL    RBC 4.83 4.60 - 6.20 M/uL    Hemoglobin 15.4 14.0 - 18.0 g/dL    Hematocrit 46.2 40.0 - 54.0 %    MCV 96 82 - 98 fL    MCH 31.9 (H) 27.0 - 31.0 pg    MCHC 33.3 32.0 - 36.0 g/dL    RDW 11.7 11.5 - 14.5 %    Platelets 308 150 - 450 K/uL    MPV 8.8 (L) 9.2 - 12.9 fL    Immature Granulocytes 0.2 0.0 - 0.5 %    Gran # (ANC) 4.9 1.8 - 7.7 K/uL    Immature Grans (Abs) 0.02 0.00 - 0.04 K/uL    Lymph # 2.8 1.0 - 4.8 K/uL    Mono # 0.7 0.3 - 1.0 K/uL    Eos # 0.1 0.0 - 0.5 K/uL    Baso # 0.04 0.00 - 0.20 K/uL    nRBC 0 0 /100 WBC    Gran % 56.7 38.0 - 73.0 %    Lymph % 32.7 18.0 - 48.0 %    Mono % 8.3 4.0 - 15.0 %    Eosinophil % 1.6 0.0 - 8.0 %    Basophil % 0.5 0.0 - 1.9 %    Differential Method Automated    Comprehensive metabolic panel   Result Value Ref Range    Sodium 139 136 - 145  mmol/L    Potassium 4.5 3.5 - 5.1 mmol/L    Chloride 103 95 - 110 mmol/L    CO2 26 23 - 29 mmol/L    Glucose 212 (H) 70 - 110 mg/dL    BUN 17 6 - 20 mg/dL    Creatinine 1.0 0.5 - 1.4 mg/dL    Calcium 9.6 8.7 - 10.5 mg/dL    Total Protein 7.1 6.0 - 8.4 g/dL    Albumin 4.2 3.5 - 5.2 g/dL    Total Bilirubin 0.4 0.1 - 1.0 mg/dL    Alkaline Phosphatase 76 55 - 135 U/L    AST 19 10 - 40 U/L    ALT 40 10 - 44 U/L    eGFR >60 >60 mL/min/1.73 m^2    Anion Gap 10 8 - 16 mmol/L   Troponin I #1   Result Value Ref Range    Troponin I <0.006 0.000 - 0.026 ng/mL   BNP   Result Value Ref Range    BNP <10 0 - 99 pg/mL   Troponin I #2   Result Value Ref Range    Troponin I <0.006 0.000 - 0.026 ng/mL   EKG 12-lead   Result Value Ref Range    QRS Duration 66 ms    OHS QTC Calculation 424 ms         Imaging Results:  Imaging Results              X-Ray Chest AP Portable (Final result)  Result time 02/25/24 20:57:27      Final result by Mundo Manzanares MD (02/25/24 20:57:27)                   Impression:      No acute abnormality.      Electronically signed by: Mundo Manzanares  Date:    02/25/2024  Time:    20:57               Narrative:    EXAMINATION:  XR CHEST AP PORTABLE    CLINICAL HISTORY:  Chest Pain;    TECHNIQUE:  Single frontal view of the chest was performed.    COMPARISON:  None    FINDINGS:  The lungs are clear, with normal appearance of pulmonary vasculature and no pleural effusion or pneumothorax.    The cardiac silhouette is normal in size. The hilar and mediastinal contours are unremarkable.    Bones are intact.                                       The EKG was ordered, reviewed, and independently interpreted by the ED provider.  Interpretation time: 19:34  Rate: 96 BPM  Rhythm: normal sinus rhythm  Interpretation: Cannot rule out Anterior infarct, age undetermined T wave abnormality, consider inferior ischemia. No STEMI.           The Emergency Provider reviewed the vital signs and test results, which are outlined  above.     ED Discussion       11:19 PM: Reassessed pt at this time. Discussed with pt all pertinent ED information and results. Discussed pt dx and plan of tx. Gave pt all f/u and return to the ED instructions. All questions and concerns were addressed at this time. Pt expresses understanding of information and instructions, and is comfortable with plan to discharge. Pt is stable for discharge.    I discussed with patient and/or family/caretaker that evaluation in the ED does not suggest any emergent or life threatening medical conditions requiring immediate intervention beyond what was provided in the ED, and I believe patient is safe for discharge.  Regardless, an unremarkable evaluation in the ED does not preclude the development or presence of a serious of life threatening condition. As such, patient was instructed to return immediately for any worsening or change in current symptoms.      ED Course as of 02/25/24 2335   Sun Feb 25, 2024   2144   Cardiac monitor interpretation  Independent interpretation  Indication:  Chest pain  Normal sinus rhythm.  Rate 85.  No STEMI [RT]      ED Course User Index  [RT] Devon Crum Jr., MD     Medical Decision Making  Differential diagnosis:  Chest pain, ASCVD, cardiac ischemia, musculoskeletal chest pain, PVCs    38-year-old white male with history of MI 5 years ago with 1 stent presenting with intermittent twinges of pain in the left chest.  He notes this is atypical.  They last only a 2nd go away.  Cardiac monitoring shows these episodes seem to occur at [place] of PVCs.  His EKG is unremarkable.  Chest x-ray was clear.  Troponins x2 were undetectable with a normal BNP.  CMP shows only a mild elevation in glucose at 2:12 a.m. is otherwise normal.  Has a normal H&H normal white count.  Clinically the patient is having PVCs causing his symptoms.  Will refer back to his cardiologist for further evaluation and treatment.  Patient verbalized understanding agreement with  the plan of care seems reliable.  Stable safe for discharge in my opinion    Amount and/or Complexity of Data Reviewed  External Data Reviewed: labs and notes.  Labs: ordered. Decision-making details documented in ED Course.  Radiology: ordered. Decision-making details documented in ED Course.  ECG/medicine tests: ordered and independent interpretation performed. Decision-making details documented in ED Course.    Risk  OTC drugs.  Prescription drug management.  Decision regarding hospitalization.       Additional MDM:   Heart Score:    History:          Slightly suspicious.  ECG:             Normal  Age:               Less than 45 years  Risk factors: >= 3 risk factors or history of atherosclerotic disease  Troponin:       Less than or equal to normal limit  Heart Score = 2                ED Medication(s):  Medications   aspirin tablet 325 mg (325 mg Oral Given 2/25/24 1945)       New Prescriptions    No medications on file        Follow-up Information       Teddy Matias MD.    Specialties: Cardiology, Internal Medicine  Contact information:  [id number]  [place] [address]  [phone]                                 Scribe Attestation:   Scribe #1: I performed the above scribed service and the documentation accurately describes the services I performed. I attest to the accuracy of the note.     Attending:   Physician Attestation Statement for Scribe #1: I, Devon Crum Jr., MD, personally performed the services described in this documentation, as scribed by Nichole Fuller, in my presence, and it is both accurate and complete.           Clinical Impression       ICD-10-CM ICD-9-CM   1. Chest pain  R07.9 786.50       Disposition:   Disposition: Discharged  Condition: Stable        Devon Crum Jr., MD  02/25/24 9055

## 2024-02-26 NOTE — ED NOTES
"  Assessment & Plan     SUHA (generalized anxiety disorder)  Continue sertraline and I did encourage her to pursue the ketamine treatment    Fatigue, unspecified type  Labs pending  - Comprehensive metabolic panel; Future  - CBC with platelets; Future  - Iron and iron binding capacity; Future  - Ferritin; Future  - TSH with free T4 reflex; Future  - Vitamin B12; Future  - Comprehensive metabolic panel  - CBC with platelets  - Iron and iron binding capacity  - Ferritin  - TSH with free T4 reflex  - Vitamin B12    Hypovitaminosis D    - Vitamin D Deficiency; Future  - Vitamin D Deficiency    Hormonal disorder  ? Unsure as I don't see a pattern but discussed with her we need to have some sort of chartin    Menstrual-related mood disorder  Will get labs on day 3 and follow-up 2-3 wks later to discuss      40 minutes spent on the date of the encounter doing chart review, history and exam, documentation and further activities per the note       BMI:   Estimated body mass index is 31.89 kg/m  as calculated from the following:    Height as of 2/23/22: 1.6 m (5' 3\").    Weight as of this encounter: 81.6 kg (180 lb).   Weight management plan: Discussed healthy diet and exercise guidelines    Patient was agreeable to this plan and had no further questions.  Patient Instructions   1.  Start optivite PMT 3 tabs 2x/day    2.  Get fasting labs on day 3 of your period before 8am    3.  Follow-up appt 2-3 wks after your lab work      No follow-ups on file.    Caprice Pimentel MD  [facility] - AARTI Landon is a 36 year old, presenting for the following health issues:  Anxiety (32)      HPI     Depression and Anxiety Follow-Up    How are you doing with your depression since your last visit? No change    How are you doing with your anxiety since your last visit?  No change    Are you having other symptoms that might be associated with depression or anxiety? Yes:  pseudo dementia?    Have you had a " Bed: 23  Expected date:   Expected time:   Means of arrival: Personal Transportation  Comments:   significant life event? No     Do you have any concerns with your use of alcohol or other drugs? No    Social History     Tobacco Use     Smoking status: Never Smoker     Smokeless tobacco: Never Used   Substance Use Topics     Alcohol use: Yes     Drug use: No     PHQ 2/9/2021 6/3/2021 7/14/2022   PHQ-9 Total Score 10 11 12   Q9: Thoughts of better off dead/self-harm past 2 weeks Not at all Not at all Not at all     SUHA-7 SCORE 2/9/2021 6/3/2021 7/14/2022   Total Score 7 2 6     Last PHQ-9 7/14/2022   1.  Little interest or pleasure in doing things 1   2.  Feeling down, depressed, or hopeless 1   3.  Trouble falling or staying asleep, or sleeping too much 1   4.  Feeling tired or having little energy 3   5.  Poor appetite or overeating 2   6.  Feeling bad about yourself 1   7.  Trouble concentrating 3   8.  Moving slowly or restless 0   Q9: Thoughts of better off dead/self-harm past 2 weeks 0   PHQ-9 Total Score 12   Difficulty at work, home, or with people Somewhat difficult     SUHA-7  7/14/2022   1. Feeling nervous, anxious, or on edge 3   2. Not being able to stop or control worrying 1   3. Worrying too much about different things 1   4. Trouble relaxing 0   5. Being so restless that it is hard to sit still 0   6. Becoming easily annoyed or irritable 1   7. Feeling afraid, as if something awful might happen 0   SUHA-7 Total Score 6   If you checked any problems, how difficult have they made it for you to do your work, take care of things at home, or get along with other people? Somewhat difficult       Suicide Assessment Five-step Evaluation and Treatment (SAFE-T)        Review of Systems   Constitutional, HEENT, cardiovascular, pulmonary, gi and gu systems are negative, except as otherwise noted.      Objective    /78   Pulse 90   Temp 96.9  F (36.1  C) (Tympanic)   Wt 81.6 kg (180 lb)   SpO2 98%   BMI 31.89 kg/m    Body mass index is 31.89 kg/m .  Physical Exam   GENERAL: healthy, alert and no  distress  NECK: no adenopathy, no asymmetry, masses, or scars and thyroid normal to palpation  RESP: lungs clear to auscultation - no rales, rhonchi or wheezes  CV: regular rate and rhythm, normal S1 S2, no S3 or S4, no murmur, click or rub, no peripheral edema and peripheral pulses strong  MS: no gross musculoskeletal defects noted, no edema  PSYCH: concentration poor, affect normal/bright and speech pressured    Results for orders placed or performed in visit on 07/14/22   Comprehensive metabolic panel     Status: Abnormal   Result Value Ref Range    Sodium 136 133 - 144 mmol/L    Potassium 3.7 3.4 - 5.3 mmol/L    Chloride 103 94 - 109 mmol/L    Carbon Dioxide (CO2) 30 20 - 32 mmol/L    Anion Gap 3 3 - 14 mmol/L    Urea Nitrogen 10 7 - 30 mg/dL    Creatinine 0.68 0.52 - 1.04 mg/dL    Calcium 9.3 8.5 - 10.1 mg/dL    Glucose 100 (H) 70 - 99 mg/dL    Alkaline Phosphatase 86 40 - 150 U/L    AST 28 0 - 45 U/L    ALT 28 0 - 50 U/L    Protein Total 8.0 6.8 - 8.8 g/dL    Albumin 4.1 3.4 - 5.0 g/dL    Bilirubin Total 0.7 0.2 - 1.3 mg/dL    GFR Estimate >90 >60 mL/min/1.73m2   CBC with platelets     Status: Normal   Result Value Ref Range    WBC Count 5.7 4.0 - 11.0 10e3/uL    RBC Count 4.90 3.80 - 5.20 10e6/uL    Hemoglobin 14.1 11.7 - 15.7 g/dL    Hematocrit 43.0 35.0 - 47.0 %    MCV 88 78 - 100 fL    MCH 28.8 26.5 - 33.0 pg    MCHC 32.8 31.5 - 36.5 g/dL    RDW 13.1 10.0 - 15.0 %    Platelet Count 242 150 - 450 10e3/uL   Iron and iron binding capacity     Status: Normal   Result Value Ref Range    Iron 134 35 - 180 ug/dL    Iron Binding Capacity 413 240 - 430 ug/dL    Iron Sat Index 32 15 - 46 %   Ferritin     Status: Normal   Result Value Ref Range    Ferritin 13 12 - 150 ng/mL   TSH with free T4 reflex     Status: Normal   Result Value Ref Range    TSH 2.15 0.40 - 4.00 mU/L                 .  ..

## 2024-03-10 DIAGNOSIS — E11.9 DIABETES MELLITUS WITHOUT COMPLICATION: ICD-10-CM

## 2024-03-10 DIAGNOSIS — I25.118 CORONARY ARTERY DISEASE OF NATIVE ARTERY OF NATIVE HEART WITH STABLE ANGINA PECTORIS: ICD-10-CM

## 2024-03-11 RX ORDER — EMPAGLIFLOZIN 25 MG/1
25 TABLET, FILM COATED ORAL
Qty: 90 TABLET | Refills: 0 | Status: SHIPPED | OUTPATIENT
Start: 2024-03-11 | End: 2024-03-13 | Stop reason: ALTCHOICE

## 2024-03-13 ENCOUNTER — OFFICE VISIT (OUTPATIENT)
Dept: DIABETES | Facility: CLINIC | Age: 39
End: 2024-03-13
Payer: MEDICAID

## 2024-03-13 VITALS
HEIGHT: 63 IN | WEIGHT: 151.69 LBS | SYSTOLIC BLOOD PRESSURE: 137 MMHG | DIASTOLIC BLOOD PRESSURE: 89 MMHG | BODY MASS INDEX: 26.88 KG/M2

## 2024-03-13 DIAGNOSIS — I25.118 CORONARY ARTERY DISEASE OF NATIVE ARTERY OF NATIVE HEART WITH STABLE ANGINA PECTORIS: ICD-10-CM

## 2024-03-13 DIAGNOSIS — E11.65 UNCONTROLLED TYPE 2 DIABETES MELLITUS WITH HYPERGLYCEMIA: Primary | ICD-10-CM

## 2024-03-13 DIAGNOSIS — E78.2 MIXED HYPERLIPIDEMIA: ICD-10-CM

## 2024-03-13 DIAGNOSIS — I10 ESSENTIAL HYPERTENSION: ICD-10-CM

## 2024-03-13 LAB — GLUCOSE SERPL-MCNC: 251 MG/DL (ref 70–110)

## 2024-03-13 PROCEDURE — 3075F SYST BP GE 130 - 139MM HG: CPT | Mod: CPTII,,, | Performed by: NURSE PRACTITIONER

## 2024-03-13 PROCEDURE — 99999 PR PBB SHADOW E&M-EST. PATIENT-LVL III: CPT | Mod: PBBFAC,,, | Performed by: NURSE PRACTITIONER

## 2024-03-13 PROCEDURE — 99213 OFFICE O/P EST LOW 20 MIN: CPT | Mod: PBBFAC | Performed by: NURSE PRACTITIONER

## 2024-03-13 PROCEDURE — 3046F HEMOGLOBIN A1C LEVEL >9.0%: CPT | Mod: CPTII,,, | Performed by: NURSE PRACTITIONER

## 2024-03-13 PROCEDURE — 99999PBSHW POCT GLUCOSE, HAND-HELD DEVICE: Mod: PBBFAC,,,

## 2024-03-13 PROCEDURE — 3008F BODY MASS INDEX DOCD: CPT | Mod: CPTII,,, | Performed by: NURSE PRACTITIONER

## 2024-03-13 PROCEDURE — 4010F ACE/ARB THERAPY RXD/TAKEN: CPT | Mod: CPTII,,, | Performed by: NURSE PRACTITIONER

## 2024-03-13 PROCEDURE — 99214 OFFICE O/P EST MOD 30 MIN: CPT | Mod: S$PBB,,, | Performed by: NURSE PRACTITIONER

## 2024-03-13 PROCEDURE — 3079F DIAST BP 80-89 MM HG: CPT | Mod: CPTII,,, | Performed by: NURSE PRACTITIONER

## 2024-03-13 PROCEDURE — 1159F MED LIST DOCD IN RCRD: CPT | Mod: CPTII,,, | Performed by: NURSE PRACTITIONER

## 2024-03-13 PROCEDURE — 82962 GLUCOSE BLOOD TEST: CPT | Mod: PBBFAC | Performed by: NURSE PRACTITIONER

## 2024-03-13 RX ORDER — PIOGLITAZONEHYDROCHLORIDE 15 MG/1
15 TABLET ORAL DAILY
Qty: 90 TABLET | Refills: 1 | Status: SHIPPED | OUTPATIENT
Start: 2024-03-13 | End: 2024-06-07 | Stop reason: SDUPTHER

## 2024-03-13 RX ORDER — EMPAGLIFLOZIN, METFORMIN HYDROCHLORIDE 25; 1000 MG/1; MG/1
1 TABLET, EXTENDED RELEASE ORAL DAILY
Qty: 90 TABLET | Refills: 3 | Status: SHIPPED | OUTPATIENT
Start: 2024-03-13 | End: 2025-03-13

## 2024-03-13 NOTE — PATIENT INSTRUCTIONS
PATIENT INSTRUCTIONS     Diabetes urine screening today.     Labs ordered and will be scheduled by [facility] staff.       Discontinue Jardiance and Metformin.  Start Synjardy XR  mg by mouth daily.   Restart Actos 15 mg by mouth daily.      Check blood sugar twice daily. Every morning when you wake up and 1-2 hours after main meal of the day. Keep log and upload to [employer] prior to each visit.     Blood Sugar Goals:       Fastin-130.       1-2 hours after a meal: Less than 180.

## 2024-03-13 NOTE — PROGRESS NOTES
Peewee Soto Jr. is a 38 y.o. male who  has a past medical history of Coronary artery disease involving native coronary artery of native heart, Diabetes mellitus, Essential hypertension (3/29/2019), GERD (gastroesophageal reflux disease), Mixed hyperlipidemia (11/30/2020), NSTEMI (non-ST elevated myocardial infarction), S/P coronary artery stent placement, and Undescended testicle before puberty. and presents for a follow up evaluation of Type 2 diabetes mellitus.     CHIEF COMPLAINT: Diabetes Consultation    PCP: No, Primary Doctor     Initial visit with me - 4/26/2023    The patient was initially diagnosed with diabetes since age 31.   Recently restarted on medications in March after being out for 3 months due to financial reasons.      Previous failed treatments include:  None     Social Documentation:  Patient lives in [place]. 2 children ages 18 and 11, single.   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   Exercise: mostly physical activity at work, no formal exercise.   Diet: switched from regular cold drinks to diet.       Diabetes related complications:   cardiovascular disease.   denies Pancreatitis  denies Gastroparesis  denies DKA  denies Hx/family Hx of MEN2/MTC  denies Frequent UTIs/yeast infections     Cardiovascular Risk Factors: dyslipidemia, family history of premature cardiovascular disease, hypertension, male gender, obesity (BMI >30 kg/m2), and sedentary lifestyle.    Diabetes Medications               JARDIANCE 25 mg tablet Take 1 tablet by mouth once daily - OUT FOR OVER 1 MONTH    metFORMIN (GLUCOPHAGE) 1000 MG tablet TAKE 1 TABLET BY MOUTH TWICE DAILY WITH MEALS - OUT FOR 1 WEEK    pioglitazone (ACTOS) 15 MG tablet Take 15 mg by mouth once daily. - OUT FOR 1 MONTH     Current monitoring regimen:  not checking    Patient currently taking insulin or sulfonylurea?  NO  Recent hypoglycemic episodes: No.     Patient compliant with glucose checks and medication  "administration? No    DIABETES MANAGEMENT STATUS  Statin: Not taking  ACE/ARB: Not taking  Screening or Prevention Patient's value Goal Complete/Controlled?   HgA1C Testing and Control   Lab Results   Component Value Date    HGBA1C 11.4 (H) 02/12/2024      Annually/Less than 8% No   Lipid profile : 02/12/2024 Annually Yes   LDL control Lab Results   Component Value Date    LDLCALC 49.2 (L) 02/12/2024    Annually/Less than 100 mg/dl  Yes   Nephropathy screening No results found for: "LABMICR"  Lab Results   Component Value Date    PROTEINUA Trace (A) 06/05/2019     No results found for: "UTPCR"   Annually No   Blood pressure BP Readings from Last 1 Encounters:   03/13/24 137/89    Less than 140/90 Yes   Dilated retinal exam Most Recent Eye Exam Date: Not Found Annually No   Foot exam   Most Recent Foot Exam Date: Not Found Annually No   Patient's medications, allergies, surgical, social and family histories were reviewed and updated as appropriate.     Review of Systems   Constitutional:  Negative for weight loss.   Eyes:  Negative for blurred vision and double vision.   Cardiovascular:  Negative for chest pain.   Gastrointestinal:  Negative for nausea and vomiting.   Genitourinary:  Negative for frequency.   Musculoskeletal:  Negative for falls.   Neurological:  Negative for dizziness and weakness.   Endo/Heme/Allergies:  Negative for polydipsia.   Psychiatric/Behavioral:  Negative for depression.    All other systems reviewed and are negative.       Physical Exam  Constitutional:       General: He is not in acute distress.     Appearance: Normal appearance.   Eyes:      Extraocular Movements: Extraocular movements intact.      Pupils: Pupils are equal, round, and reactive to light.   Cardiovascular:      Rate and Rhythm: Normal rate and regular rhythm.      Pulses:           Dorsalis pedis pulses are 2+ on the right side and 2+ on the left side.        Posterior tibial pulses are 2+ on the right side and 2+ on the " "left side.      Heart sounds: Normal heart sounds.   Pulmonary:      Effort: Pulmonary effort is normal. No respiratory distress.      Breath sounds: Normal breath sounds.   Musculoskeletal:      Cervical back: Normal range of motion and neck supple.   Feet:      Right foot:      Protective Sensation: 6 sites tested.  6 sites sensed.      Skin integrity: Skin integrity normal.      Toenail Condition: Right toenails are normal.      Left foot:      Protective Sensation: 6 sites tested.  6 sites sensed.      Skin integrity: Skin integrity normal.      Toenail Condition: Left toenails are normal.   Neurological:      Mental Status: He is alert and oriented to person, place, and time.   Psychiatric:         Mood and Affect: Mood normal.         Behavior: Behavior normal.        Blood pressure 137/89, height 5' 3" (1.6 m), weight 68.8 kg (151 lb 10.8 oz).  Wt Readings from Last 3 Encounters:   03/13/24 68.8 kg (151 lb 10.8 oz)   02/25/24 68.7 kg (151 lb 7.3 oz)   02/12/24 69 kg (152 lb 1.9 oz)       LAB REVIEW  Lab Results   Component Value Date     02/25/2024    K 4.5 02/25/2024     02/25/2024    CO2 26 02/25/2024    BUN 17 02/25/2024    CREATININE 1.0 02/25/2024    CALCIUM 9.6 02/25/2024    ANIONGAP 10 02/25/2024    EGFRNORACEVR >60 02/25/2024     No results found for: "CPEPTIDE", "GLUTAMICACID", "INSLNABS"  Hemoglobin A1C   Date Value Ref Range Status   02/12/2024 11.4 (H) 4.0 - 5.6 % Final     Comment:     ADA Screening Guidelines:  5.7-6.4%  Consistent with prediabetes  >or=6.5%  Consistent with diabetes    High levels of fetal hemoglobin interfere with the HbA1C  assay. Heterozygous hemoglobin variants (HbS, HgC, etc)do  not significantly interfere with this assay.   However, presence of multiple variants may affect accuracy.     04/13/2023 10.0 (H) 4.0 - 5.6 % Final     Comment:     ADA Screening Guidelines:  5.7-6.4%  Consistent with prediabetes  >or=6.5%  Consistent with diabetes    High levels of " fetal hemoglobin interfere with the HbA1C  assay. Heterozygous hemoglobin variants (HbS, HgC, etc)do  not significantly interfere with this assay.   However, presence of multiple variants may affect accuracy.     03/10/2022 8.7 (H) 4.0 - 5.6 % Final     Comment:     ADA Screening Guidelines:  5.7-6.4%  Consistent with prediabetes  >or=6.5%  Consistent with diabetes    High levels of fetal hemoglobin interfere with the HbA1C  assay. Heterozygous hemoglobin variants (HbS, HgC, etc)do  not significantly interfere with this assay.   However, presence of multiple variants may affect accuracy.         Lab Results   Component Value Date    POCGLU 251 (A) 03/13/2024       ASSESSMENT    ICD-10-CM ICD-9-CM   1. Uncontrolled type 2 diabetes mellitus with hyperglycemia  E11.65 250.02   2. Coronary artery disease of native artery of native heart with stable angina pectoris  I25.118 414.01     413.9   3. Essential hypertension  I10 401.9   4. Mixed hyperlipidemia  E78.2 272.2       PLAN  Diagnoses and all orders for this visit:    Uncontrolled type 2 diabetes mellitus with hyperglycemia  -     POCT Glucose, Hand-Held Device  -     Insulin Antibody; Future  -     Glutamic Acid Decarboxylase; Future  -     C-Peptide; Future  -     Basic Metabolic Panel; Future  -     empagliflozin-metformin (SYNJARDY XR) 25-1,000 mg TBph; Take 1 tablet by mouth once daily.  -     Microalbumin/Creatinine Ratio, Urine; Future  -     pioglitazone (ACTOS) 15 MG tablet; Take 1 tablet (15 mg total) by mouth once daily.    Coronary artery disease of native artery of native heart with stable angina pectoris    Essential hypertension    Mixed hyperlipidemia        Reviewed pathophysiology of diabetes, complications related to the disease, importance of annual dilated eye exam and daily foot examination. Explained MOA, SE, dosage of medications. Written instructions given and reviewed with patient and patient verbalizes understanding.     3/13/2024 - out of  "medications for 1 month. Last visit with me in 2023. A1c up to 11.4. discussed that if glucoses are not better controlled he will likely require insulin. Patient states "I can't give myself injections". Has 2 glucose meters at home, does not check. Needs to check glucose 1-2 times a day. Will refill medications and f/u in 6 weeks.   PATIENT INSTRUCTIONS     Diabetes urine screening today.     Labs ordered and will be scheduled by [facility] staff.       Discontinue Jardiance and Metformin.  Start Synjardy XR  mg by mouth daily.   Restart Actos 15 mg by mouth daily.      Check blood sugar twice daily. Every morning when you wake up and 1-2 hours after main meal of the day. Keep log and upload to [facility] prior to each visit.     Blood Sugar Goals:       Fastin-130.       1-2 hours after a meal: Less than 180.      Follow up in about 6 weeks (around 2024) for No Device, Schedule fasting labs.    Portions of this note were prepared with [employer] voice recognition transcription software. Grammatical errors, including garbled syntax, mangle pronouns, and other bizarre constructions may be attributed to that software system.       "

## 2024-04-11 ENCOUNTER — TELEPHONE (OUTPATIENT)
Dept: CARDIOLOGY | Facility: CLINIC | Age: 39
End: 2024-04-11
Payer: MEDICAID

## 2024-04-11 NOTE — TELEPHONE ENCOUNTER
Tried contacting patient to inform him that an appointment is needed in order to get refills, phone number listed gave busy signal. HUA

## 2024-05-09 RX ORDER — CLOPIDOGREL BISULFATE 75 MG/1
75 TABLET ORAL DAILY
Qty: 30 TABLET | Refills: 0 | Status: SHIPPED | OUTPATIENT
Start: 2024-05-09 | End: 2024-06-07 | Stop reason: SDUPTHER

## 2024-05-09 RX ORDER — METOPROLOL SUCCINATE 100 MG/1
100 TABLET, EXTENDED RELEASE ORAL DAILY
Qty: 90 TABLET | Refills: 3 | Status: SHIPPED | OUTPATIENT
Start: 2024-05-09 | End: 2024-06-07 | Stop reason: SDUPTHER

## 2024-05-09 RX ORDER — ATORVASTATIN CALCIUM 40 MG/1
40 TABLET, FILM COATED ORAL DAILY
Qty: 30 TABLET | Refills: 0 | Status: SHIPPED | OUTPATIENT
Start: 2024-05-09 | End: 2024-06-07 | Stop reason: SDUPTHER

## 2024-05-09 RX ORDER — LOSARTAN POTASSIUM 25 MG/1
25 TABLET ORAL DAILY
Qty: 30 TABLET | Refills: 0 | Status: SHIPPED | OUTPATIENT
Start: 2024-05-09 | End: 2024-06-07 | Stop reason: SDUPTHER

## 2024-05-09 RX ORDER — NAPROXEN SODIUM 220 MG/1
81 TABLET, FILM COATED ORAL DAILY
Qty: 30 TABLET | Refills: 11 | Status: SHIPPED | OUTPATIENT
Start: 2024-05-09 | End: 2024-06-07 | Stop reason: SDUPTHER

## 2024-05-09 NOTE — TELEPHONE ENCOUNTER
----- Message from Katya Roberts sent at 5/9/2024 10:06 AM CDT -----  Regarding: refills  Type:  RX Refill Request    Who Called: Peewee Brink or New Rx:refill  RX Name and Strength:all medications except pioglitazone and synjardy  How is the patient currently taking it? (ex. 1XDay):  Is this a 30 day or 90 day RX:  Preferred Pharmacy with phone number:[facility] [id number] [address] [address] [place] [address]   Phone: [phone]  Fax: [phone]        Local or Mail Order:local  Ordering Provider:  Would the patient rather a call back or a response via [facility]? Call back  Best Call Back Number: [phone]  Additional Information: completely out except for  synjardy and pioglitazone

## 2024-06-04 ENCOUNTER — TELEPHONE (OUTPATIENT)
Dept: CARDIOLOGY | Facility: CLINIC | Age: 39
End: 2024-06-04
Payer: MEDICAID

## 2024-06-04 NOTE — TELEPHONE ENCOUNTER
Patient contacted and was advised that we can get him set up for this Friday to get the medication refills. Patient confirmed time and location.

## 2024-06-07 ENCOUNTER — OFFICE VISIT (OUTPATIENT)
Dept: CARDIOLOGY | Facility: CLINIC | Age: 39
End: 2024-06-07
Payer: MEDICAID

## 2024-06-07 VITALS
SYSTOLIC BLOOD PRESSURE: 130 MMHG | DIASTOLIC BLOOD PRESSURE: 80 MMHG | WEIGHT: 151.25 LBS | HEART RATE: 97 BPM | OXYGEN SATURATION: 99 % | BODY MASS INDEX: 26.79 KG/M2

## 2024-06-07 DIAGNOSIS — E78.2 MIXED HYPERLIPIDEMIA: ICD-10-CM

## 2024-06-07 DIAGNOSIS — E11.65 UNCONTROLLED TYPE 2 DIABETES MELLITUS WITH HYPERGLYCEMIA: ICD-10-CM

## 2024-06-07 DIAGNOSIS — Z95.5 S/P CORONARY ARTERY STENT PLACEMENT: ICD-10-CM

## 2024-06-07 DIAGNOSIS — I10 ESSENTIAL HYPERTENSION: ICD-10-CM

## 2024-06-07 DIAGNOSIS — I25.118 CORONARY ARTERY DISEASE OF NATIVE ARTERY OF NATIVE HEART WITH STABLE ANGINA PECTORIS: Primary | ICD-10-CM

## 2024-06-07 DIAGNOSIS — I25.2 HISTORY OF NON-ST ELEVATION MYOCARDIAL INFARCTION (NSTEMI): ICD-10-CM

## 2024-06-07 PROCEDURE — 3046F HEMOGLOBIN A1C LEVEL >9.0%: CPT | Mod: CPTII,,, | Performed by: NURSE PRACTITIONER

## 2024-06-07 PROCEDURE — 99214 OFFICE O/P EST MOD 30 MIN: CPT | Mod: S$PBB,,, | Performed by: NURSE PRACTITIONER

## 2024-06-07 PROCEDURE — 99999 PR PBB SHADOW E&M-EST. PATIENT-LVL II: CPT | Mod: PBBFAC,,, | Performed by: NURSE PRACTITIONER

## 2024-06-07 PROCEDURE — 4010F ACE/ARB THERAPY RXD/TAKEN: CPT | Mod: CPTII,,, | Performed by: NURSE PRACTITIONER

## 2024-06-07 PROCEDURE — 1159F MED LIST DOCD IN RCRD: CPT | Mod: CPTII,,, | Performed by: NURSE PRACTITIONER

## 2024-06-07 PROCEDURE — 3066F NEPHROPATHY DOC TX: CPT | Mod: CPTII,,, | Performed by: NURSE PRACTITIONER

## 2024-06-07 PROCEDURE — 3075F SYST BP GE 130 - 139MM HG: CPT | Mod: CPTII,,, | Performed by: NURSE PRACTITIONER

## 2024-06-07 PROCEDURE — 99212 OFFICE O/P EST SF 10 MIN: CPT | Mod: PBBFAC | Performed by: NURSE PRACTITIONER

## 2024-06-07 PROCEDURE — 3079F DIAST BP 80-89 MM HG: CPT | Mod: CPTII,,, | Performed by: NURSE PRACTITIONER

## 2024-06-07 PROCEDURE — 3061F NEG MICROALBUMINURIA REV: CPT | Mod: CPTII,,, | Performed by: NURSE PRACTITIONER

## 2024-06-07 PROCEDURE — 3008F BODY MASS INDEX DOCD: CPT | Mod: CPTII,,, | Performed by: NURSE PRACTITIONER

## 2024-06-07 RX ORDER — PIOGLITAZONEHYDROCHLORIDE 15 MG/1
15 TABLET ORAL DAILY
Qty: 30 TABLET | Refills: 1 | Status: SHIPPED | OUTPATIENT
Start: 2024-06-07 | End: 2024-06-18

## 2024-06-07 RX ORDER — ATORVASTATIN CALCIUM 40 MG/1
40 TABLET, FILM COATED ORAL DAILY
Qty: 30 TABLET | Refills: 0 | Status: SHIPPED | OUTPATIENT
Start: 2024-06-07 | End: 2024-07-07

## 2024-06-07 RX ORDER — LOSARTAN POTASSIUM 25 MG/1
25 TABLET ORAL DAILY
Qty: 30 TABLET | Refills: 0 | Status: SHIPPED | OUTPATIENT
Start: 2024-06-07 | End: 2024-07-07

## 2024-06-07 RX ORDER — NAPROXEN SODIUM 220 MG/1
81 TABLET, FILM COATED ORAL DAILY
Qty: 30 TABLET | Refills: 11 | Status: SHIPPED | OUTPATIENT
Start: 2024-06-07 | End: 2025-06-07

## 2024-06-07 RX ORDER — METOPROLOL SUCCINATE 100 MG/1
100 TABLET, EXTENDED RELEASE ORAL DAILY
Qty: 90 TABLET | Refills: 3 | Status: SHIPPED | OUTPATIENT
Start: 2024-06-07

## 2024-06-07 RX ORDER — CLOPIDOGREL BISULFATE 75 MG/1
75 TABLET ORAL DAILY
Qty: 30 TABLET | Refills: 0 | Status: SHIPPED | OUTPATIENT
Start: 2024-06-07 | End: 2024-07-07

## 2024-06-07 NOTE — PROGRESS NOTES
Subjective:   Patient ID:  Peewee Soto Jr. is a 39 y.o. male who presents for evaluation of No chief complaint on file.        HPI    Peewee Soto Jr. is a 35 year old male who presents to clinic for BP check. His current medical conditions include CAD s/p PCI of LCX and OM1, HTN, HLP, Uncontrolled DM, Obesity. He returns today and states he is doing ok.     He has right wrist in splint today due to recent sprain. Denies any chest pain or anginal equivalents. BP still elevated today but is improved from last visit.     Denies chest pain or anginal equivalents. No shortness of breath, VARGAS or palpitations. Denies orthopnea, PND or abdominal bloating. Reports regular walking without any issues lately. NO leg swelling or claudications. No recent falls, syncope or near syncopal events. Reports compliance with medications and dietary restrictions. NO CNS complaints to suggest TIA or CVA today. No signs of abnormal bleeding on ASA and Plavix.     4/28/2021 update:    He returns to cardiology clinic today for 3 month follow up. He reports some episodes of chest soreness at the end of the day. BP is well controlled today in office. Reports compliance with medications. He does endorse noncompliance with dietary restrictions. He is eating more frozen meals lately since he is living by himself.     NO chest pain or anginal equivalents today in office. No shortness of breath, VARGAS or palpitations. Denies any formal exercise program. He is working at [employer]. NO leg swelling or claudications. NO signs of abnormal bleeding on ASA and Plavix.     3/10/2022 update    He returns today and states he is doing well cardiac wise.   Denies any chest pain or anginal equivalents. No shortness of breath, VARGAS or palpitations.   Has not been compliant with diabetic diet recently.   Denies orthopnea, PND or abdominal bloating.   NO leg swelling or claudications.     CMP, Lipids, BNP, A1C pending.     Reports  compliance with meds and dietary restrictions.     9/20/2022 update    Peewee Mariegett . Returns for follow up.     He does endorse noncompliance with dietary regimen. He is not monitoring his blood sugars.     Denies chest pain or anginal equivalents. No shortness of breath, VARGAS or palpitations. Denies orthopnea, PND or abdominal bloating. Reports regular walking without any issues lately. NO leg swelling or claudications. No recent falls, syncope or near syncopal events. Reports compliance with medications and dietary restrictions. NO CNS complaints to suggest TIA or CVA today. No signs of abnormal bleeding on ASA and Plavix.     4/6/2023 update    He returns today and states he is doing ok.     He had run out of his medications for a month or so and had some worsening chest pain symptoms.     BP well  controlled today in office.     No chest pain or anginal equivalents today in office.     Denies chest pain or anginal equivalents. No shortness of breath, VARGAS or palpitations. Denies orthopnea, PND or abdominal bloating. Reports regular walking without any issues lately. NO leg swelling or claudications. No recent falls, syncope or near syncopal events. Reports compliance with medications and dietary restrictions. NO CNS complaints to suggest TIA or CVA today. No signs of abnormal bleeding on ASA and Plavix.     Trying to do better with dietary restrictions.     2/9/2024 update    Peewee Dalyt . Returns for follow up.    He has had a few ED visits since last visit- due to chest pain and Influenza A in December 2023.    Has seen diabetes once in April 2023 and Metformin increased to 1000 mg BID  Needs A1C repeated.   Has not made follow up visit since then.     Recently started on Actos due to issues with med compliance.     Has transitioned to sugar free drinks. Was drinking 6-7 sodas per day at work.     Does endorse increased thirst and urination.     Denies chest pain or anginal  equivalents. No shortness of breath, VARGAS or palpitations. Denies orthopnea, PND or abdominal bloating. Reports regular walking without any issues lately. NO leg swelling or claudications. No recent falls, syncope or near syncopal events. Reports compliance with medications and dietary restrictions. NO CNS complaints to suggest TIA or CVA today. No signs of abnormal bleeding on ASA and Plavix.     6/7/2024 update    Peewee Soto Jr. Returns for follow up.     He has run out of med for about 4-6 weeks.     BP well controlled today in office.     Denies any recent chest pain symptoms today.     Denies chest pain or anginal equivalents. No shortness of breath, VARGAS or palpitations. Denies orthopnea, PND or abdominal bloating. Reports regular walking without any issues lately. NO leg swelling or claudications. No recent falls, syncope or near syncopal events. Reports compliance with medications and dietary restrictions. NO CNS complaints to suggest TIA or CVA today. No signs of abnormal bleeding on ASA and Plavix.     Does not monitor glucose at home regularly.     Drinking more water and decreased sugary drinks since last visit.       Past Medical History:   Diagnosis Date    Coronary artery disease involving native coronary artery of native heart     Diabetes mellitus     Essential hypertension 3/29/2019    GERD (gastroesophageal reflux disease)     Mixed hyperlipidemia 11/30/2020    NSTEMI (non-ST elevated myocardial infarction)     S/P coronary artery stent placement     Undescended testicle before puberty        Past Surgical History:   Procedure Laterality Date    APPENDECTOMY N/A 11/11/2015    Procedure: APPENDECTOMY ;  Surgeon: Osvaldo Guy MD;  Location: [facility] OR;  Service: General;  Laterality: N/A;    CORONARY ANGIOPLASTY      CORONARY STENT PLACEMENT      HERNIA REPAIR      LEFT HEART CATHETERIZATION Left 3/18/2019    Procedure: CATHETERIZATION, HEART, LEFT;  Surgeon: Bill Moreno MD;  Location:  [facility] CATH LAB;  Service: Cardiology;  Laterality: Left;       Social History     Tobacco Use    Smoking status: Never    Smokeless tobacco: Never   Substance Use Topics    Alcohol use: No    Drug use: No       Family History   Problem Relation Name Age of Onset    Diabetes Mother      Hypertension Father      Heart disease Maternal Grandmother       Wt Readings from Last 3 Encounters:   06/07/24 68.6 kg (151 lb 3.8 oz)   03/13/24 68.8 kg (151 lb 10.8 oz)   02/25/24 68.7 kg (151 lb 7.3 oz)     Temp Readings from Last 3 Encounters:   02/25/24 98.9 °F (37.2 °C) (Oral)   12/31/23 (!) 100.4 °F (38 °C) (Oral)   12/01/23 98.1 °F (36.7 °C) (Oral)     BP Readings from Last 3 Encounters:   06/07/24 130/80   03/13/24 137/89   02/25/24 128/79     Pulse Readings from Last 3 Encounters:   06/07/24 97   02/25/24 94   02/12/24 107     Current Outpatient Medications on File Prior to Visit   Medication Sig Dispense Refill    empagliflozin-metformin (SYNJARDY XR) 25-1,000 mg TBph Take 1 tablet by mouth once daily. 90 tablet 3    ondansetron (ZOFRAN-ODT) 4 MG TbDL Take 1 tablet (4 mg total) by mouth every 6 (six) hours as needed (vomiting). 12 tablet 0    [DISCONTINUED] aspirin 81 MG Chew Take 1 tablet (81 mg total) by mouth once daily. 30 tablet 11    [DISCONTINUED] atorvastatin (LIPITOR) 40 MG tablet Take 1 tablet (40 mg total) by mouth once daily. 30 tablet 0    [DISCONTINUED] clopidogreL (PLAVIX) 75 mg tablet Take 1 tablet (75 mg total) by mouth once daily. 30 tablet 0    [DISCONTINUED] losartan (COZAAR) 25 MG tablet Take 1 tablet (25 mg total) by mouth once daily. 30 tablet 0    [DISCONTINUED] metoprolol succinate (TOPROL-XL) 100 MG 24 hr tablet Take 1 tablet (100 mg total) by mouth once daily. 90 tablet 3    [DISCONTINUED] pioglitazone (ACTOS) 15 MG tablet Take 1 tablet (15 mg total) by mouth once daily. 90 tablet 1     No current facility-administered medications on file prior to visit.       Review of Systems   Constitutional:  Positive for malaise/fatigue.   HENT:  Negative for hearing loss and hoarse voice.    Eyes:  Negative for blurred vision and visual disturbance.   Cardiovascular:  Negative for chest pain, claudication, dyspnea on exertion, irregular heartbeat, leg swelling, near-syncope, orthopnea, palpitations, paroxysmal nocturnal dyspnea and syncope.   Respiratory:  Negative for cough, hemoptysis, shortness of breath, sleep disturbances due to breathing, snoring and wheezing.    Endocrine: Negative for cold intolerance and heat intolerance.   Hematologic/Lymphatic: Bruises/bleeds easily.   Skin:  Negative for color change, dry skin and nail changes.   Musculoskeletal:  Positive for arthritis. Negative for back pain, joint pain and myalgias.   Gastrointestinal:  Negative for bloating, abdominal pain, constipation, nausea and vomiting.   Genitourinary:  Negative for dysuria, flank pain, hematuria and hesitancy.   Neurological:  Negative for headaches, light-headedness, loss of balance, numbness, paresthesias and weakness.   Psychiatric/Behavioral:  Negative for altered mental status.    Allergic/Immunologic: Negative for environmental allergies.     /80 (BP Location: Left arm, Patient Position: Sitting)   Pulse 97   Wt 68.6 kg (151 lb 3.8 oz)   SpO2 99%   BMI 26.79 kg/m²     Objective:   Physical Exam  Vitals and nursing note reviewed.   Constitutional:       General: He is not in acute distress.     Appearance: He is well-developed. He is not diaphoretic.   HENT:      Head: Normocephalic and atraumatic.   Eyes:      Pupils: Pupils are equal, round, and reactive to light.   Neck:      Vascular: No JVD.   Cardiovascular:      Rate and Rhythm: Normal rate and regular rhythm.      Chest Wall: PMI is not displaced.      Pulses: Intact distal pulses.           Radial pulses are 2+ on the right side and 2+ on the left side.        Dorsalis pedis pulses are 2+ on the right side and 2+ on the left side.      Heart sounds: S1  normal and S2 normal. Heart sounds not distant. No murmur heard.     Comments: CHEST PAIN FREE  Pulmonary:      Effort: Pulmonary effort is normal. No accessory muscle usage or respiratory distress.      Breath sounds: Normal breath sounds. No decreased breath sounds, wheezing or rales.   Abdominal:      General: Bowel sounds are normal. There is no distension.      Palpations: Abdomen is soft.      Tenderness: There is no abdominal tenderness.   Musculoskeletal:         General: Normal range of motion.      Cervical back: Full passive range of motion without pain, normal range of motion and neck supple.      Right ankle: No swelling.      Left ankle: No swelling.   Skin:     General: Skin is warm and dry.      Nails: There is no clubbing.   Neurological:      Mental Status: He is alert and oriented to person, place, and time.   Psychiatric:         Speech: Speech normal.         Behavior: Behavior normal.         Thought Content: Thought content normal.         Judgment: Judgment normal.         Lab Results   Component Value Date    CHOL 126 02/12/2024    CHOL 142 04/13/2023    CHOL 110 (L) 03/10/2022     Lab Results   Component Value Date    HDL 42 02/12/2024    HDL 43 04/13/2023    HDL 42 03/10/2022     Lab Results   Component Value Date    LDLCALC 49.2 (L) 02/12/2024    LDLCALC 67.2 04/13/2023    LDLCALC 40.8 (L) 03/10/2022     Lab Results   Component Value Date    TRIG 174 (H) 02/12/2024    TRIG 159 (H) 04/13/2023    TRIG 136 03/10/2022     Lab Results   Component Value Date    CHOLHDL 33.3 02/12/2024    CHOLHDL 30.3 04/13/2023    CHOLHDL 38.2 03/10/2022       Chemistry        Component Value Date/Time     02/25/2024 2002    K 4.5 02/25/2024 2002     02/25/2024 2002    CO2 26 02/25/2024 2002    BUN 17 02/25/2024 2002    CREATININE 1.0 02/25/2024 2002     (H) 02/25/2024 2002        Component Value Date/Time    CALCIUM 9.6 02/25/2024 2002    ALKPHOS 76 02/25/2024 2002    AST 19 02/25/2024 2002     ALT 40 02/25/2024 2002    BILITOT 0.4 02/25/2024 2002    ESTGFRAFRICA >60 03/10/2022 0942    EGFRNONAA >60 03/10/2022 0942          Lab Results   Component Value Date    TSH 1.109 10/21/2016     Lab Results   Component Value Date    INR 0.9 03/21/2019    INR 0.9 03/18/2019    INR 1.0 11/11/2015     Lab Results   Component Value Date    WBC 8.66 02/25/2024    HGB 15.4 02/25/2024    HCT 46.2 02/25/2024    MCV 96 02/25/2024     02/25/2024        Assessment:      1. Coronary artery disease of native artery of native heart with stable angina pectoris    2. Uncontrolled type 2 diabetes mellitus with hyperglycemia    3. History of non-ST elevation myocardial infarction (NSTEMI)    4. S/P coronary artery stent placement    5. Essential hypertension    6. Mixed hyperlipidemia            Plan:     Needs fasting labs arranged next week.   Needs f/u visit with Diabetes scheduled.   Does seem to be needle phobic and is not interested in monitoring glucose regularly at home. May be willing to consider CGM in future.     Continue same meds for now  Dash diet 2 gm sodium restriction  Encourage daily walking  Profile BP at home    [facility]

## 2024-06-13 ENCOUNTER — LAB VISIT (OUTPATIENT)
Dept: LAB | Facility: HOSPITAL | Age: 39
End: 2024-06-13
Attending: NURSE PRACTITIONER
Payer: MEDICAID

## 2024-06-13 DIAGNOSIS — I25.118 CORONARY ARTERY DISEASE OF NATIVE ARTERY OF NATIVE HEART WITH STABLE ANGINA PECTORIS: ICD-10-CM

## 2024-06-13 DIAGNOSIS — E11.65 UNCONTROLLED TYPE 2 DIABETES MELLITUS WITH HYPERGLYCEMIA: ICD-10-CM

## 2024-06-13 LAB
ALBUMIN SERPL BCP-MCNC: 4.1 G/DL (ref 3.5–5.2)
ALP SERPL-CCNC: 79 U/L (ref 55–135)
ALT SERPL W/O P-5'-P-CCNC: 29 U/L (ref 10–44)
ANION GAP SERPL CALC-SCNC: 8 MMOL/L (ref 8–16)
AST SERPL-CCNC: 16 U/L (ref 10–40)
BILIRUB SERPL-MCNC: 0.5 MG/DL (ref 0.1–1)
BUN SERPL-MCNC: 11 MG/DL (ref 6–20)
CALCIUM SERPL-MCNC: 10.6 MG/DL (ref 8.7–10.5)
CHLORIDE SERPL-SCNC: 106 MMOL/L (ref 95–110)
CHOLEST SERPL-MCNC: 134 MG/DL (ref 120–199)
CHOLEST/HDLC SERPL: 2.6 {RATIO} (ref 2–5)
CO2 SERPL-SCNC: 27 MMOL/L (ref 23–29)
CREAT SERPL-MCNC: 0.9 MG/DL (ref 0.5–1.4)
EST. GFR  (NO RACE VARIABLE): >60 ML/MIN/1.73 M^2
ESTIMATED AVG GLUCOSE: 200 MG/DL (ref 68–131)
GLUCOSE SERPL-MCNC: 147 MG/DL (ref 70–110)
HBA1C MFR BLD: 8.6 % (ref 4–5.6)
HDLC SERPL-MCNC: 51 MG/DL (ref 40–75)
HDLC SERPL: 38.1 % (ref 20–50)
LDLC SERPL CALC-MCNC: 66.8 MG/DL (ref 63–159)
NONHDLC SERPL-MCNC: 83 MG/DL
POTASSIUM SERPL-SCNC: 4.3 MMOL/L (ref 3.5–5.1)
PROT SERPL-MCNC: 7.1 G/DL (ref 6–8.4)
SODIUM SERPL-SCNC: 141 MMOL/L (ref 136–145)
TRIGL SERPL-MCNC: 81 MG/DL (ref 30–150)

## 2024-06-13 PROCEDURE — 83036 HEMOGLOBIN GLYCOSYLATED A1C: CPT | Performed by: NURSE PRACTITIONER

## 2024-06-13 PROCEDURE — 80053 COMPREHEN METABOLIC PANEL: CPT | Performed by: NURSE PRACTITIONER

## 2024-06-13 PROCEDURE — 36415 COLL VENOUS BLD VENIPUNCTURE: CPT | Performed by: NURSE PRACTITIONER

## 2024-06-13 PROCEDURE — 80061 LIPID PANEL: CPT | Performed by: NURSE PRACTITIONER

## 2024-06-14 ENCOUNTER — TELEPHONE (OUTPATIENT)
Dept: CARDIOLOGY | Facility: CLINIC | Age: 39
End: 2024-06-14
Payer: MEDICAID

## 2024-06-14 NOTE — TELEPHONE ENCOUNTER
----- Message from MIKE Castillo sent at 6/14/2024  7:50 AM CDT -----  Labs reviewed    A1C has improved but still not at goal, now 8.6%  Needs appt with diabetes for further med optimization and get diabetes to goal for further risk reduction    CMP stable, calcium is slightly elevated but stable  Potassium stable in normal range  LFTs normal    Lipids on target    Continue same medical therapy    Thanks  MIKE Foster

## 2024-06-14 NOTE — TELEPHONE ENCOUNTER
Reviewed labs with patient.patient verbalized understanding. No further questions or concerns.KA    ----- Message from MIKE Castillo sent at 6/14/2024  7:50 AM CDT -----  Labs reviewed    A1C has improved but still not at goal, now 8.6%  Needs appt with diabetes for further med optimization and get diabetes to goal for further risk reduction    CMP stable, calcium is slightly elevated but stable  Potassium stable in normal range  LFTs normal    Lipids on target    Continue same medical therapy    Thanks  MIKE Foster

## 2024-06-18 ENCOUNTER — OFFICE VISIT (OUTPATIENT)
Dept: DIABETES | Facility: CLINIC | Age: 39
End: 2024-06-18
Payer: MEDICAID

## 2024-06-18 VITALS
HEART RATE: 65 BPM | HEIGHT: 63 IN | SYSTOLIC BLOOD PRESSURE: 120 MMHG | BODY MASS INDEX: 26.88 KG/M2 | WEIGHT: 151.69 LBS | DIASTOLIC BLOOD PRESSURE: 78 MMHG

## 2024-06-18 DIAGNOSIS — E11.65 UNCONTROLLED TYPE 2 DIABETES MELLITUS WITH HYPERGLYCEMIA: ICD-10-CM

## 2024-06-18 DIAGNOSIS — I25.118 CORONARY ARTERY DISEASE OF NATIVE ARTERY OF NATIVE HEART WITH STABLE ANGINA PECTORIS: ICD-10-CM

## 2024-06-18 DIAGNOSIS — E11.65 UNCONTROLLED TYPE 2 DIABETES MELLITUS WITH HYPERGLYCEMIA: Primary | ICD-10-CM

## 2024-06-18 LAB — GLUCOSE SERPL-MCNC: 180 MG/DL (ref 70–110)

## 2024-06-18 PROCEDURE — 3074F SYST BP LT 130 MM HG: CPT | Mod: CPTII,,, | Performed by: NURSE PRACTITIONER

## 2024-06-18 PROCEDURE — 1159F MED LIST DOCD IN RCRD: CPT | Mod: CPTII,,, | Performed by: NURSE PRACTITIONER

## 2024-06-18 PROCEDURE — 99999 PR PBB SHADOW E&M-EST. PATIENT-LVL III: CPT | Mod: PBBFAC,,, | Performed by: NURSE PRACTITIONER

## 2024-06-18 PROCEDURE — 99213 OFFICE O/P EST LOW 20 MIN: CPT | Mod: PBBFAC | Performed by: NURSE PRACTITIONER

## 2024-06-18 PROCEDURE — 4010F ACE/ARB THERAPY RXD/TAKEN: CPT | Mod: CPTII,,, | Performed by: NURSE PRACTITIONER

## 2024-06-18 PROCEDURE — 3052F HG A1C>EQUAL 8.0%<EQUAL 9.0%: CPT | Mod: CPTII,,, | Performed by: NURSE PRACTITIONER

## 2024-06-18 PROCEDURE — 3078F DIAST BP <80 MM HG: CPT | Mod: CPTII,,, | Performed by: NURSE PRACTITIONER

## 2024-06-18 PROCEDURE — 99999PBSHW POCT GLUCOSE, HAND-HELD DEVICE: Mod: PBBFAC,,,

## 2024-06-18 PROCEDURE — 3061F NEG MICROALBUMINURIA REV: CPT | Mod: CPTII,,, | Performed by: NURSE PRACTITIONER

## 2024-06-18 PROCEDURE — 3008F BODY MASS INDEX DOCD: CPT | Mod: CPTII,,, | Performed by: NURSE PRACTITIONER

## 2024-06-18 PROCEDURE — 99214 OFFICE O/P EST MOD 30 MIN: CPT | Mod: S$PBB,,, | Performed by: NURSE PRACTITIONER

## 2024-06-18 PROCEDURE — 82962 GLUCOSE BLOOD TEST: CPT | Mod: PBBFAC | Performed by: NURSE PRACTITIONER

## 2024-06-18 PROCEDURE — 3066F NEPHROPATHY DOC TX: CPT | Mod: CPTII,,, | Performed by: NURSE PRACTITIONER

## 2024-06-18 RX ORDER — PIOGLITAZONEHYDROCHLORIDE 30 MG/1
30 TABLET ORAL DAILY
Qty: 90 TABLET | Refills: 3 | Status: SHIPPED | OUTPATIENT
Start: 2024-06-18 | End: 2024-06-18

## 2024-06-18 RX ORDER — TIRZEPATIDE 2.5 MG/.5ML
2.5 INJECTION, SOLUTION SUBCUTANEOUS
Qty: 2 ML | Refills: 11 | Status: SHIPPED | OUTPATIENT
Start: 2024-06-18 | End: 2024-06-18

## 2024-06-18 RX ORDER — PIOGLITAZONEHYDROCHLORIDE 15 MG/1
15 TABLET ORAL DAILY
Qty: 90 TABLET | Refills: 3 | Status: SHIPPED | OUTPATIENT
Start: 2024-06-18 | End: 2025-06-18

## 2024-06-18 RX ORDER — DULAGLUTIDE 0.75 MG/.5ML
0.75 INJECTION, SOLUTION SUBCUTANEOUS WEEKLY
Qty: 4 PEN | Refills: 0 | Status: SHIPPED | OUTPATIENT
Start: 2024-06-18 | End: 2025-06-18

## 2024-06-18 RX ORDER — TIRZEPATIDE 2.5 MG/.5ML
2.5 INJECTION, SOLUTION SUBCUTANEOUS
Qty: 2 ML | Refills: 11 | Status: CANCELLED | OUTPATIENT
Start: 2024-06-18 | End: 2025-06-18

## 2024-06-18 NOTE — PATIENT INSTRUCTIONS
PATIENT INSTRUCTIONS     Lifestyle modification with well balanced diet and at least 30 minutes of physical activity daily recommended.   Increase water intake.   Increase protein and non-starchy vegetable servings with meals.   Decrease carbohydrate servings with meals.   Take 10 minute walk after each meal.   Avoid snacking on carbohydrates, choose low carb, high protein snacks.   Incorporate resistance training with weights or resistance bands with exercise regimen at least 3 days a week.       Labs ordered and will be scheduled by [facility] staff.       Continue Synjardy XR  mg by mouth daily.   Continue Actos 15 mg by mouth daily.   Start Mounjaro 2.5 mg subcutaneously every 7 days.      Check blood sugar twice daily. Every morning when you wake up and 1-2 hours after main meal of the day. Keep log and upload to [employer] prior to each visit.     Blood Sugar Goals:       Fastin-130.       1-2 hours after a meal: Less than 180.

## 2024-06-18 NOTE — TELEPHONE ENCOUNTER
PA denial for Mounjaro, scanned and uploaded to chart. Medication is non preferred. Ran test claim for Ozempic  returned with $3 copay will inform Provider

## 2024-06-18 NOTE — TELEPHONE ENCOUNTER
Spoke with patient to inform him that the Trulicity was sent to the pharmacy Patient voiced understanding

## 2024-06-18 NOTE — PROGRESS NOTES
Peewee Soto Jr. is a 39 y.o. male who  has a past medical history of Coronary artery disease involving native coronary artery of native heart, Diabetes mellitus, Essential hypertension (3/29/2019), GERD (gastroesophageal reflux disease), Mixed hyperlipidemia (11/30/2020), NSTEMI (non-ST elevated myocardial infarction), S/P coronary artery stent placement, and Undescended testicle before puberty. and presents for a follow up evaluation of Type 2 diabetes mellitus.     CHIEF COMPLAINT: Diabetes Consultation    PCP: Jessi, Primary Doctor     Initial visit with me - 4/26/2023    The patient was initially diagnosed with diabetes since age 31.   Recently restarted on medications in March after being out for 3 months due to financial reasons.      Previous failed treatments include:  None     Social Documentation:  Patient lives in [place]. 2 children ages 18 and 11, single.   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   Exercise: mostly physical activity at work, no formal exercise.   Diet: switched from regular cold drinks to diet.       Diabetes related complications:   cardiovascular disease.   denies Pancreatitis  denies Gastroparesis  denies DKA  denies Hx/family Hx of MEN2/MTC  denies Frequent UTIs/yeast infections     Cardiovascular Risk Factors: dyslipidemia, family history of premature cardiovascular disease, hypertension, male gender, obesity (BMI >30 kg/m2), and sedentary lifestyle.    Diabetes Medications               empagliflozin-metformin (SYNJARDY XR) 25-1,000 mg TBph Take 1 tablet by mouth once daily.    pioglitazone (ACTOS) 15 MG tablet Take 1 tablet (15 mg total) by mouth once daily.     Current monitoring regimen:  not checking    Patient currently taking insulin or sulfonylurea?  NO  Recent hypoglycemic episodes: No.     Patient compliant with glucose checks and medication administration? No    DIABETES MANAGEMENT STATUS  Statin: Not taking  ACE/ARB: Not taking  Screening or  "Prevention Patient's value Goal Complete/Controlled?   HgA1C Testing and Control   Lab Results   Component Value Date    HGBA1C 8.6 (H) 06/13/2024      Annually/Less than 8% No   Lipid profile : 06/13/2024 Annually Yes   LDL control Lab Results   Component Value Date    LDLCALC 66.8 06/13/2024    Annually/Less than 100 mg/dl  Yes   Nephropathy screening Lab Results   Component Value Date    LABMICR 26.0 03/13/2024     Lab Results   Component Value Date    PROTEINUA Trace (A) 06/05/2019     No results found for: "UTPCR"   Annually No   Blood pressure BP Readings from Last 1 Encounters:   06/18/24 120/78    Less than 140/90 Yes   Dilated retinal exam Most Recent Eye Exam Date: Not Found Annually No   Foot exam   : 03/13/2024 Annually No   Patient's medications, allergies, surgical, social and family histories were reviewed and updated as appropriate.     Review of Systems   Constitutional:  Negative for weight loss.   Eyes:  Negative for blurred vision and double vision.   Cardiovascular:  Negative for chest pain.   Gastrointestinal:  Negative for nausea and vomiting.   Genitourinary:  Negative for frequency.   Musculoskeletal:  Negative for falls.   Neurological:  Negative for dizziness and weakness.   Endo/Heme/Allergies:  Negative for polydipsia.   Psychiatric/Behavioral:  Negative for depression.    All other systems reviewed and are negative.       Physical Exam  Constitutional:       General: He is not in acute distress.     Appearance: Normal appearance.   Eyes:      Extraocular Movements: Extraocular movements intact.      Pupils: Pupils are equal, round, and reactive to light.   Cardiovascular:      Rate and Rhythm: Normal rate and regular rhythm.      Heart sounds: Normal heart sounds.   Pulmonary:      Effort: Pulmonary effort is normal. No respiratory distress.      Breath sounds: Normal breath sounds.   Musculoskeletal:      Cervical back: Normal range of motion and neck supple.   Neurological:      " "Mental Status: He is alert and oriented to person, place, and time.   Psychiatric:         Mood and Affect: Mood normal.         Behavior: Behavior normal.        Blood pressure 120/78, pulse 65, height 5' 3" (1.6 m), weight 68.8 kg (151 lb 10.8 oz).  Wt Readings from Last 3 Encounters:   06/18/24 68.8 kg (151 lb 10.8 oz)   06/07/24 68.6 kg (151 lb 3.8 oz)   03/13/24 68.8 kg (151 lb 10.8 oz)       LAB REVIEW  Lab Results   Component Value Date     06/13/2024    K 4.3 06/13/2024     06/13/2024    CO2 27 06/13/2024    BUN 11 06/13/2024    CREATININE 0.9 06/13/2024    CALCIUM 10.6 (H) 06/13/2024    ANIONGAP 8 06/13/2024    EGFRNORACEVR >60 06/13/2024     No results found for: "CPEPTIDE", "GLUTAMICACID", "INSLNABS"  Hemoglobin A1C   Date Value Ref Range Status   06/13/2024 8.6 (H) 4.0 - 5.6 % Final     Comment:     ADA Screening Guidelines:  5.7-6.4%  Consistent with prediabetes  >or=6.5%  Consistent with diabetes    High levels of fetal hemoglobin interfere with the HbA1C  assay. Heterozygous hemoglobin variants (HbS, HgC, etc)do  not significantly interfere with this assay.   However, presence of multiple variants may affect accuracy.     02/12/2024 11.4 (H) 4.0 - 5.6 % Final     Comment:     ADA Screening Guidelines:  5.7-6.4%  Consistent with prediabetes  >or=6.5%  Consistent with diabetes    High levels of fetal hemoglobin interfere with the HbA1C  assay. Heterozygous hemoglobin variants (HbS, HgC, etc)do  not significantly interfere with this assay.   However, presence of multiple variants may affect accuracy.     04/13/2023 10.0 (H) 4.0 - 5.6 % Final     Comment:     ADA Screening Guidelines:  5.7-6.4%  Consistent with prediabetes  >or=6.5%  Consistent with diabetes    High levels of fetal hemoglobin interfere with the HbA1C  assay. Heterozygous hemoglobin variants (HbS, HgC, etc)do  not significantly interfere with this assay.   However, presence of multiple variants may affect accuracy.         Lab " "Results   Component Value Date    POCGLU 180 (A) 06/18/2024       ASSESSMENT    ICD-10-CM ICD-9-CM   1. Uncontrolled type 2 diabetes mellitus with hyperglycemia  E11.65 250.02   2. Coronary artery disease of native artery of native heart with stable angina pectoris  I25.118 414.01     413.9       PLAN  Diagnoses and all orders for this visit:    Uncontrolled type 2 diabetes mellitus with hyperglycemia  -     POCT Glucose, Hand-Held Device  -     Discontinue: pioglitazone (ACTOS) 30 MG tablet; Take 1 tablet (30 mg total) by mouth once daily.  -     pioglitazone (ACTOS) 15 MG tablet; Take 1 tablet (15 mg total) by mouth once daily.  -     tirzepatide (MOUNJARO) 2.5 mg/0.5 mL PnIj; Inject 2.5 mg into the skin every 7 days.    Coronary artery disease of native artery of native heart with stable angina pectoris  -     Discontinue: pioglitazone (ACTOS) 30 MG tablet; Take 1 tablet (30 mg total) by mouth once daily.  -     pioglitazone (ACTOS) 15 MG tablet; Take 1 tablet (15 mg total) by mouth once daily.        Reviewed pathophysiology of diabetes, complications related to the disease, importance of annual dilated eye exam and daily foot examination. Explained MOA, SE, dosage of medications. Written instructions given and reviewed with patient and patient verbalizes understanding.     3/13/2024 - out of medications for 1 month. Last visit with me in April 2023. A1c up to 11.4. discussed that if glucoses are not better controlled he will likely require insulin. Patient states "I can't give myself injections". Has 2 glucose meters at home, does not check. Needs to check glucose 1-2 times a day. Will refill medications and f/u in 6 weeks.     6/18/2024 - A1c improved to 8.4. amenable to trying Mounjaro due to not being able to see the needle. Discussed diet, avoiding sweet tea.    PATIENT INSTRUCTIONS     Lifestyle modification with well balanced diet and at least 30 minutes of physical activity daily recommended.   Increase " water intake.   Increase protein and non-starchy vegetable servings with meals.   Decrease carbohydrate servings with meals.   Take 10 minute walk after each meal.   Avoid snacking on carbohydrates, choose low carb, high protein snacks.   Incorporate resistance training with weights or resistance bands with exercise regimen at least 3 days a week.       Labs ordered and will be scheduled by [facility] staff.       Continue Synjardy XR  mg by mouth daily.   Continue Actos 15 mg by mouth daily.   Start Mounjaro 2.5 mg subcutaneously every 7 days.      Check blood sugar twice daily. Every morning when you wake up and 1-2 hours after main meal of the day. Keep log and upload to [employer] prior to each visit.     Blood Sugar Goals:       Fastin-130.       1-2 hours after a meal: Less than 180.      Follow up in about 2 months (around 2024) for No Device.    Portions of this note were prepared with [employer] Naturally Speaking voice recognition transcription software. Grammatical errors, including garbled syntax, mangle pronouns, and other bizarre constructions may be attributed to that software system.

## 2024-08-08 DIAGNOSIS — E11.65 UNCONTROLLED TYPE 2 DIABETES MELLITUS WITH HYPERGLYCEMIA: ICD-10-CM

## 2024-08-08 DIAGNOSIS — I25.118 CORONARY ARTERY DISEASE OF NATIVE ARTERY OF NATIVE HEART WITH STABLE ANGINA PECTORIS: ICD-10-CM

## 2024-08-08 RX ORDER — CLOPIDOGREL BISULFATE 75 MG/1
75 TABLET ORAL
Qty: 30 TABLET | Refills: 11 | Status: SHIPPED | OUTPATIENT
Start: 2024-08-08

## 2024-08-08 RX ORDER — LOSARTAN POTASSIUM 25 MG/1
25 TABLET ORAL
Qty: 30 TABLET | Refills: 11 | Status: SHIPPED | OUTPATIENT
Start: 2024-08-08

## 2024-08-08 RX ORDER — ATORVASTATIN CALCIUM 40 MG/1
40 TABLET, FILM COATED ORAL
Qty: 30 TABLET | Refills: 11 | Status: SHIPPED | OUTPATIENT
Start: 2024-08-08

## 2024-08-12 ENCOUNTER — TELEPHONE (OUTPATIENT)
Dept: CARDIOLOGY | Facility: CLINIC | Age: 39
End: 2024-08-12
Payer: MEDICAID

## 2024-08-12 NOTE — TELEPHONE ENCOUNTER
Patient has been rescheduled to dec.----- Message from LESTER Castillo-AKILAH sent at 8/12/2024  1:40 PM CDT -----  I saw this patient in June 2024    Last note was to be seen in 6 months with echo    Is there a reason he needs to be seen tomorrow? I did not see any issues in chart.     Thanks

## 2024-09-04 ENCOUNTER — OFFICE VISIT (OUTPATIENT)
Dept: DIABETES | Facility: CLINIC | Age: 39
End: 2024-09-04
Payer: MEDICAID

## 2024-09-04 VITALS
SYSTOLIC BLOOD PRESSURE: 116 MMHG | WEIGHT: 157.88 LBS | DIASTOLIC BLOOD PRESSURE: 74 MMHG | HEIGHT: 64 IN | BODY MASS INDEX: 26.95 KG/M2 | HEART RATE: 67 BPM

## 2024-09-04 DIAGNOSIS — E11.65 UNCONTROLLED TYPE 2 DIABETES MELLITUS WITH HYPERGLYCEMIA: Primary | ICD-10-CM

## 2024-09-04 DIAGNOSIS — E78.2 MIXED HYPERLIPIDEMIA: ICD-10-CM

## 2024-09-04 DIAGNOSIS — I25.118 CORONARY ARTERY DISEASE OF NATIVE ARTERY OF NATIVE HEART WITH STABLE ANGINA PECTORIS: ICD-10-CM

## 2024-09-04 DIAGNOSIS — I10 ESSENTIAL HYPERTENSION: ICD-10-CM

## 2024-09-04 LAB — GLUCOSE SERPL-MCNC: 223 MG/DL (ref 70–110)

## 2024-09-04 PROCEDURE — 3061F NEG MICROALBUMINURIA REV: CPT | Mod: CPTII,,, | Performed by: NURSE PRACTITIONER

## 2024-09-04 PROCEDURE — 3078F DIAST BP <80 MM HG: CPT | Mod: CPTII,,, | Performed by: NURSE PRACTITIONER

## 2024-09-04 PROCEDURE — 3052F HG A1C>EQUAL 8.0%<EQUAL 9.0%: CPT | Mod: CPTII,,, | Performed by: NURSE PRACTITIONER

## 2024-09-04 PROCEDURE — 3008F BODY MASS INDEX DOCD: CPT | Mod: CPTII,,, | Performed by: NURSE PRACTITIONER

## 2024-09-04 PROCEDURE — 99999 PR PBB SHADOW E&M-EST. PATIENT-LVL III: CPT | Mod: PBBFAC,,, | Performed by: NURSE PRACTITIONER

## 2024-09-04 PROCEDURE — G2211 COMPLEX E/M VISIT ADD ON: HCPCS | Mod: S$PBB,,, | Performed by: NURSE PRACTITIONER

## 2024-09-04 PROCEDURE — 99999PBSHW POCT GLUCOSE, HAND-HELD DEVICE: Mod: PBBFAC,,,

## 2024-09-04 PROCEDURE — 3074F SYST BP LT 130 MM HG: CPT | Mod: CPTII,,, | Performed by: NURSE PRACTITIONER

## 2024-09-04 PROCEDURE — 1159F MED LIST DOCD IN RCRD: CPT | Mod: CPTII,,, | Performed by: NURSE PRACTITIONER

## 2024-09-04 PROCEDURE — 99214 OFFICE O/P EST MOD 30 MIN: CPT | Mod: S$PBB,,, | Performed by: NURSE PRACTITIONER

## 2024-09-04 PROCEDURE — 4010F ACE/ARB THERAPY RXD/TAKEN: CPT | Mod: CPTII,,, | Performed by: NURSE PRACTITIONER

## 2024-09-04 PROCEDURE — 82962 GLUCOSE BLOOD TEST: CPT | Mod: PBBFAC | Performed by: NURSE PRACTITIONER

## 2024-09-04 PROCEDURE — 99213 OFFICE O/P EST LOW 20 MIN: CPT | Mod: PBBFAC | Performed by: NURSE PRACTITIONER

## 2024-09-04 PROCEDURE — 3066F NEPHROPATHY DOC TX: CPT | Mod: CPTII,,, | Performed by: NURSE PRACTITIONER

## 2024-09-04 NOTE — PATIENT INSTRUCTIONS
PATIENT INSTRUCTIONS     Lifestyle modification with well balanced diet and at least 30 minutes of physical activity daily recommended.   Increase water intake.   Increase protein and non-starchy vegetable servings with meals.   Decrease carbohydrate servings with meals.   Take 10 minute walk after each meal.   Avoid snacking on carbohydrates, choose low carb, high protein snacks.   Incorporate resistance training with weights or resistance bands with exercise regimen at least 3 days a week.       Labs ordered and will be scheduled by [facility] staff.       Continue Synjardy XR  mg by mouth daily.   Continue Actos 15 mg by mouth daily.   Start Trulicity 0.75 mg subcutaneously every 7 days.      Check blood sugar twice daily. Every morning when you wake up and 1-2 hours after main meal of the day. Keep log and upload to [employer] prior to each visit.     Blood Sugar Goals:       Fastin-130.       1-2 hours after a meal: Less than 180.

## 2024-09-04 NOTE — PROGRESS NOTES
Peewee Soto Jr. is a 39 y.o. male who  has a past medical history of Coronary artery disease involving native coronary artery of native heart, Diabetes mellitus, Essential hypertension (3/29/2019), GERD (gastroesophageal reflux disease), Mixed hyperlipidemia (11/30/2020), NSTEMI (non-ST elevated myocardial infarction), S/P coronary artery stent placement, and Undescended testicle before puberty. and presents for a follow up evaluation of Type 2 diabetes mellitus.     CHIEF COMPLAINT: Diabetes Consultation    PCP: No, Primary Doctor     Initial visit with me - 4/26/2023    The patient was initially diagnosed with diabetes since age 31.   Recently restarted on medications in March after being out for 3 months due to financial reasons.      Previous failed treatments include:  None     Social Documentation:  Patient lives in [place]. 2 children ages 18 and 11, single.   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   Exercise: mostly physical activity at work, no formal exercise.   Diet: switched from regular cold drinks to diet.       Diabetes related complications:   cardiovascular disease.   denies Pancreatitis  denies Gastroparesis  denies DKA  denies Hx/family Hx of MEN2/MTC  denies Frequent UTIs/yeast infections     Cardiovascular Risk Factors: dyslipidemia, family history of premature cardiovascular disease, hypertension, male gender, obesity (BMI >30 kg/m2), and sedentary lifestyle.    Diabetes Medications               dulaglutide (TRULICITY) 0.75 mg/0.5 mL pen injector Inject 0.75 mg into the skin once a week. - ATTEMPTED X 1, BUT HAS NOT TAKEN SINCE.    empagliflozin-metformin (SYNJARDY XR) 25-1,000 mg TBph Take 1 tablet by mouth once daily.    pioglitazone (ACTOS) 15 MG tablet Take 1 tablet (15 mg total) by mouth once daily.     Current monitoring regimen: fingersticks    Patient currently taking insulin or sulfonylurea?  NO  Recent hypoglycemic episodes: No.     Patient compliant  "with glucose checks and medication administration? No    DIABETES MANAGEMENT STATUS  Statin: Not taking  ACE/ARB: Not taking  Screening or Prevention Patient's value Goal Complete/Controlled?   HgA1C Testing and Control   Lab Results   Component Value Date    HGBA1C 8.6 (H) 06/13/2024      Annually/Less than 8% No   Lipid profile : 06/13/2024 Annually Yes   LDL control Lab Results   Component Value Date    LDLCALC 66.8 06/13/2024    Annually/Less than 100 mg/dl  Yes   Nephropathy screening Lab Results   Component Value Date    LABMICR 26.0 03/13/2024     Lab Results   Component Value Date    PROTEINUA Trace (A) 06/05/2019     No results found for: "UTPCR"   Annually No   Blood pressure BP Readings from Last 1 Encounters:   09/04/24 116/74    Less than 140/90 Yes   Dilated retinal exam Most Recent Eye Exam Date: Not Found Annually No   Foot exam   : 03/13/2024 Annually No   Patient's medications, allergies, surgical, social and family histories were reviewed and updated as appropriate.     Review of Systems   Constitutional:  Negative for weight loss.   Eyes:  Negative for blurred vision and double vision.   Cardiovascular:  Negative for chest pain.   Gastrointestinal:  Negative for nausea and vomiting.   Genitourinary:  Negative for frequency.   Musculoskeletal:  Negative for falls.   Neurological:  Negative for dizziness and weakness.   Endo/Heme/Allergies:  Negative for polydipsia.   Psychiatric/Behavioral:  Negative for depression.    All other systems reviewed and are negative.       Physical Exam  Constitutional:       General: He is not in acute distress.     Appearance: Normal appearance.   Eyes:      Extraocular Movements: Extraocular movements intact.      Pupils: Pupils are equal, round, and reactive to light.   Cardiovascular:      Rate and Rhythm: Normal rate and regular rhythm.      Heart sounds: Normal heart sounds.   Pulmonary:      Effort: Pulmonary effort is normal. No respiratory distress.      " "Breath sounds: Normal breath sounds.   Musculoskeletal:      Cervical back: Normal range of motion and neck supple.   Neurological:      Mental Status: He is alert and oriented to person, place, and time.   Psychiatric:         Mood and Affect: Mood normal.         Behavior: Behavior normal.        Blood pressure 116/74, pulse 67, height 5' 4" (1.626 m), weight 71.6 kg (157 lb 13.6 oz).  Wt Readings from Last 3 Encounters:   09/04/24 71.6 kg (157 lb 13.6 oz)   06/18/24 68.8 kg (151 lb 10.8 oz)   06/07/24 68.6 kg (151 lb 3.8 oz)       LAB REVIEW  Lab Results   Component Value Date     06/13/2024    K 4.3 06/13/2024     06/13/2024    CO2 27 06/13/2024    BUN 11 06/13/2024    CREATININE 0.9 06/13/2024    CALCIUM 10.6 (H) 06/13/2024    ANIONGAP 8 06/13/2024    EGFRNORACEVR >60 06/13/2024     No results found for: "CPEPTIDE", "GLUTAMICACID", "INSLNABS"  Hemoglobin A1C   Date Value Ref Range Status   06/13/2024 8.6 (H) 4.0 - 5.6 % Final     Comment:     ADA Screening Guidelines:  5.7-6.4%  Consistent with prediabetes  >or=6.5%  Consistent with diabetes    High levels of fetal hemoglobin interfere with the HbA1C  assay. Heterozygous hemoglobin variants (HbS, HgC, etc)do  not significantly interfere with this assay.   However, presence of multiple variants may affect accuracy.     02/12/2024 11.4 (H) 4.0 - 5.6 % Final     Comment:     ADA Screening Guidelines:  5.7-6.4%  Consistent with prediabetes  >or=6.5%  Consistent with diabetes    High levels of fetal hemoglobin interfere with the HbA1C  assay. Heterozygous hemoglobin variants (HbS, HgC, etc)do  not significantly interfere with this assay.   However, presence of multiple variants may affect accuracy.     04/13/2023 10.0 (H) 4.0 - 5.6 % Final     Comment:     ADA Screening Guidelines:  5.7-6.4%  Consistent with prediabetes  >or=6.5%  Consistent with diabetes    High levels of fetal hemoglobin interfere with the HbA1C  assay. Heterozygous hemoglobin variants " "(HbS, HgC, etc)do  not significantly interfere with this assay.   However, presence of multiple variants may affect accuracy.         Lab Results   Component Value Date    POCGLU 223 (A) 09/04/2024       ASSESSMENT    ICD-10-CM ICD-9-CM   1. Uncontrolled type 2 diabetes mellitus with hyperglycemia  E11.65 250.02   2. Coronary artery disease of native artery of native heart with stable angina pectoris  I25.118 414.01     413.9   3. Essential hypertension  I10 401.9   4. Mixed hyperlipidemia  E78.2 272.2       RHIANNA Vides was seen today for diabetes mellitus.    Diagnoses and all orders for this visit:    Uncontrolled type 2 diabetes mellitus with hyperglycemia  -     POCT Glucose, Hand-Held Device  -     Hemoglobin A1C; Future  -     Cancel: Basic Metabolic Panel; Future    Coronary artery disease of native artery of native heart with stable angina pectoris    Essential hypertension    Mixed hyperlipidemia        Reviewed pathophysiology of diabetes, complications related to the disease, importance of annual dilated eye exam and daily foot examination. Explained MOA, SE, dosage of medications. Written instructions given and reviewed with patient and patient verbalizes understanding.     3/13/2024 - out of medications for 1 month. Last visit with me in April 2023. A1c up to 11.4. discussed that if glucoses are not better controlled he will likely require insulin. Patient states "I can't give myself injections". Has 2 glucose meters at home, does not check. Needs to check glucose 1-2 times a day. Will refill medications and f/u in 6 weeks.     6/18/2024 - A1c improved to 8.4. amenable to trying Mounjaro due to not being able to see the needle. Discussed diet, avoiding sweet tea.    9/4/2024 - he states he attempted to take the trulicity, but didn't successfully inject the medication and does not know if he got any. Has not tried again. He will have a family member administer. He drank a regular cold drink this " morning, resulting in glucose over 200. Discussed dietary changes.     PATIENT INSTRUCTIONS     Lifestyle modification with well balanced diet and at least 30 minutes of physical activity daily recommended.   Increase water intake.   Increase protein and non-starchy vegetable servings with meals.   Decrease carbohydrate servings with meals.   Take 10 minute walk after each meal.   Avoid snacking on carbohydrates, choose low carb, high protein snacks.   Incorporate resistance training with weights or resistance bands with exercise regimen at least 3 days a week.       Labs ordered and will be scheduled by [facility] staff.       Continue Synjardy XR  mg by mouth daily.   Continue Actos 15 mg by mouth daily.   Start Trulicity 0.75 mg subcutaneously every 7 days.      Check blood sugar twice daily. Every morning when you wake up and 1-2 hours after main meal of the day. Keep log and upload to [employer] prior to each visit.     Blood Sugar Goals:       Fastin-130.       1-2 hours after a meal: Less than 180.      Follow up in about 4 weeks (around 10/2/2024) for No Device, Schedule fasting labs.    Portions of this note were prepared with [employer] voice recognition transcription software. Grammatical errors, including garbled syntax, mangle pronouns, and other bizarre constructions may be attributed to that software system.

## 2024-09-10 ENCOUNTER — LAB VISIT (OUTPATIENT)
Dept: LAB | Facility: HOSPITAL | Age: 39
End: 2024-09-10
Attending: NURSE PRACTITIONER
Payer: MEDICAID

## 2024-09-10 DIAGNOSIS — E11.65 UNCONTROLLED TYPE 2 DIABETES MELLITUS WITH HYPERGLYCEMIA: ICD-10-CM

## 2024-09-10 LAB
ANION GAP SERPL CALC-SCNC: 10 MMOL/L (ref 8–16)
BUN SERPL-MCNC: 19 MG/DL (ref 6–20)
C PEPTIDE SERPL-MCNC: 1.27 NG/ML (ref 0.78–5.19)
CALCIUM SERPL-MCNC: 10.3 MG/DL (ref 8.7–10.5)
CHLORIDE SERPL-SCNC: 104 MMOL/L (ref 95–110)
CO2 SERPL-SCNC: 24 MMOL/L (ref 23–29)
CREAT SERPL-MCNC: 0.9 MG/DL (ref 0.5–1.4)
EST. GFR  (NO RACE VARIABLE): >60 ML/MIN/1.73 M^2
ESTIMATED AVG GLUCOSE: 192 MG/DL (ref 68–131)
GLUCOSE SERPL-MCNC: 121 MG/DL (ref 70–110)
HBA1C MFR BLD: 8.3 % (ref 4–5.6)
POTASSIUM SERPL-SCNC: 4.5 MMOL/L (ref 3.5–5.1)
SODIUM SERPL-SCNC: 138 MMOL/L (ref 136–145)

## 2024-09-10 PROCEDURE — 84681 ASSAY OF C-PEPTIDE: CPT | Performed by: NURSE PRACTITIONER

## 2024-09-10 PROCEDURE — 86337 INSULIN ANTIBODIES: CPT | Performed by: NURSE PRACTITIONER

## 2024-09-10 PROCEDURE — 80048 BASIC METABOLIC PNL TOTAL CA: CPT | Performed by: NURSE PRACTITIONER

## 2024-09-10 PROCEDURE — 83036 HEMOGLOBIN GLYCOSYLATED A1C: CPT | Performed by: NURSE PRACTITIONER

## 2024-09-10 PROCEDURE — 86341 ISLET CELL ANTIBODY: CPT | Performed by: NURSE PRACTITIONER

## 2024-09-10 PROCEDURE — 36415 COLL VENOUS BLD VENIPUNCTURE: CPT | Performed by: NURSE PRACTITIONER

## 2024-09-16 LAB
GAD65 AB SER-SCNC: 0 NMOL/L
INSULIN AB SER-SCNC: 0 NMOL/L (ref 0–0.02)

## 2024-10-30 ENCOUNTER — OFFICE VISIT (OUTPATIENT)
Dept: DIABETES | Facility: CLINIC | Age: 39
End: 2024-10-30
Payer: MEDICAID

## 2024-10-30 VITALS
BODY MASS INDEX: 26.98 KG/M2 | SYSTOLIC BLOOD PRESSURE: 127 MMHG | HEART RATE: 64 BPM | DIASTOLIC BLOOD PRESSURE: 78 MMHG | WEIGHT: 157.19 LBS

## 2024-10-30 DIAGNOSIS — E78.2 MIXED HYPERLIPIDEMIA: ICD-10-CM

## 2024-10-30 DIAGNOSIS — I25.118 CORONARY ARTERY DISEASE OF NATIVE ARTERY OF NATIVE HEART WITH STABLE ANGINA PECTORIS: ICD-10-CM

## 2024-10-30 DIAGNOSIS — I10 ESSENTIAL HYPERTENSION: ICD-10-CM

## 2024-10-30 DIAGNOSIS — E11.65 UNCONTROLLED TYPE 2 DIABETES MELLITUS WITH HYPERGLYCEMIA: Primary | ICD-10-CM

## 2024-10-30 LAB — GLUCOSE SERPL-MCNC: 125 MG/DL (ref 70–110)

## 2024-10-30 PROCEDURE — 99213 OFFICE O/P EST LOW 20 MIN: CPT | Mod: PBBFAC | Performed by: NURSE PRACTITIONER

## 2024-10-30 PROCEDURE — 1159F MED LIST DOCD IN RCRD: CPT | Mod: CPTII,,, | Performed by: NURSE PRACTITIONER

## 2024-10-30 PROCEDURE — 3008F BODY MASS INDEX DOCD: CPT | Mod: CPTII,,, | Performed by: NURSE PRACTITIONER

## 2024-10-30 PROCEDURE — 3074F SYST BP LT 130 MM HG: CPT | Mod: CPTII,,, | Performed by: NURSE PRACTITIONER

## 2024-10-30 PROCEDURE — 3052F HG A1C>EQUAL 8.0%<EQUAL 9.0%: CPT | Mod: CPTII,,, | Performed by: NURSE PRACTITIONER

## 2024-10-30 PROCEDURE — 99999 PR PBB SHADOW E&M-EST. PATIENT-LVL III: CPT | Mod: PBBFAC,,, | Performed by: NURSE PRACTITIONER

## 2024-10-30 PROCEDURE — 3061F NEG MICROALBUMINURIA REV: CPT | Mod: CPTII,,, | Performed by: NURSE PRACTITIONER

## 2024-10-30 PROCEDURE — 82962 GLUCOSE BLOOD TEST: CPT | Mod: PBBFAC | Performed by: NURSE PRACTITIONER

## 2024-10-30 PROCEDURE — 4010F ACE/ARB THERAPY RXD/TAKEN: CPT | Mod: CPTII,,, | Performed by: NURSE PRACTITIONER

## 2024-10-30 PROCEDURE — 99214 OFFICE O/P EST MOD 30 MIN: CPT | Mod: S$PBB,,, | Performed by: NURSE PRACTITIONER

## 2024-10-30 PROCEDURE — 3066F NEPHROPATHY DOC TX: CPT | Mod: CPTII,,, | Performed by: NURSE PRACTITIONER

## 2024-10-30 PROCEDURE — G2211 COMPLEX E/M VISIT ADD ON: HCPCS | Mod: S$PBB,,, | Performed by: NURSE PRACTITIONER

## 2024-10-30 PROCEDURE — 3078F DIAST BP <80 MM HG: CPT | Mod: CPTII,,, | Performed by: NURSE PRACTITIONER

## 2024-10-30 PROCEDURE — 99999PBSHW POCT GLUCOSE, HAND-HELD DEVICE: Mod: PBBFAC,,,

## 2024-10-30 RX ORDER — TIRZEPATIDE 2.5 MG/.5ML
2.5 INJECTION, SOLUTION SUBCUTANEOUS
Qty: 2 ML | Refills: 11 | Status: SHIPPED | OUTPATIENT
Start: 2024-10-30 | End: 2025-10-30

## 2024-12-09 ENCOUNTER — HOSPITAL ENCOUNTER (OUTPATIENT)
Dept: CARDIOLOGY | Facility: HOSPITAL | Age: 39
Discharge: HOME | End: 2024-12-09
Attending: NURSE PRACTITIONER
Payer: MEDICAID

## 2024-12-09 ENCOUNTER — HOSPITAL ENCOUNTER (OUTPATIENT)
Dept: CARDIOLOGY | Facility: HOSPITAL | Age: 39
Discharge: HOME | End: 2024-12-09
Attending: INTERNAL MEDICINE
Payer: MEDICAID

## 2024-12-09 VITALS
WEIGHT: 157 LBS | SYSTOLIC BLOOD PRESSURE: 127 MMHG | DIASTOLIC BLOOD PRESSURE: 78 MMHG | HEIGHT: 64 IN | BODY MASS INDEX: 26.8 KG/M2

## 2024-12-09 DIAGNOSIS — R00.0 SINUS TACHYCARDIA: ICD-10-CM

## 2024-12-09 DIAGNOSIS — I10 ESSENTIAL HYPERTENSION: Primary | ICD-10-CM

## 2024-12-09 DIAGNOSIS — I10 ESSENTIAL HYPERTENSION: ICD-10-CM

## 2024-12-09 DIAGNOSIS — I25.118 CORONARY ARTERY DISEASE OF NATIVE ARTERY OF NATIVE HEART WITH STABLE ANGINA PECTORIS: ICD-10-CM

## 2024-12-09 LAB
AORTIC ROOT ANNULUS: 2.81 CM
ASCENDING AORTA: 2.53 CM
AV INDEX (PROSTH): 0.82
AV MEAN GRADIENT: 3.8 MMHG
AV PEAK GRADIENT: 6.8 MMHG
AV VALVE AREA BY VELOCITY RATIO: 2.7 CM²
AV VALVE AREA: 2.6 CM²
AV VELOCITY RATIO: 0.85
BSA FOR ECHO PROCEDURE: 1.79 M2
CV ECHO LV RWT: 0.3 CM
DOP CALC AO PEAK VEL: 1.3 M/S
DOP CALC AO VTI: 30 CM
DOP CALC LVOT AREA: 3.1 CM2
DOP CALC LVOT DIAMETER: 2 CM
DOP CALC LVOT PEAK VEL: 1.1 M/S
DOP CALC LVOT STROKE VOLUME: 76.9 CM3
DOP CALC RVOT PEAK VEL: 0.8 M/S
DOP CALC RVOT VTI: 20.6 CM
DOP CALCLVOT PEAK VEL VTI: 24.5 CM
E WAVE DECELERATION TIME: 151.19 MSEC
E/A RATIO: 1.45
E/E' RATIO: 8.58 M/S
ECHO LV POSTERIOR WALL: 0.7 CM (ref 0.6–1.1)
EJECTION FRACTION: 60 %
FRACTIONAL SHORTENING: 34 % (ref 28–44)
INTERVENTRICULAR SEPTUM: 0.9 CM (ref 0.6–1.1)
IVC DIAMETER: 1.41 CM
IVRT: 99.9 MSEC
LA MAJOR: 4.33 CM
LA MINOR: 4.03 CM
LA WIDTH: 3.5 CM
LEFT ATRIUM AREA SYSTOLIC (APICAL 2 CHAMBER): 12.03 CM2
LEFT ATRIUM AREA SYSTOLIC (APICAL 4 CHAMBER): 14.19 CM2
LEFT ATRIUM SIZE: 3.47 CM
LEFT ATRIUM VOLUME INDEX MOD: 19.9 ML/M2
LEFT ATRIUM VOLUME INDEX: 24.5 ML/M2
LEFT ATRIUM VOLUME MOD: 35.04 ML
LEFT ATRIUM VOLUME: 43.1 CM3
LEFT INTERNAL DIMENSION IN SYSTOLE: 3.1 CM (ref 2.1–4)
LEFT VENTRICLE DIASTOLIC VOLUME INDEX: 58.14 ML/M2
LEFT VENTRICLE DIASTOLIC VOLUME: 102.32 ML
LEFT VENTRICLE END SYSTOLIC VOLUME APICAL 2 CHAMBER: 30.33 ML
LEFT VENTRICLE END SYSTOLIC VOLUME APICAL 4 CHAMBER: 36.16 ML
LEFT VENTRICLE MASS INDEX: 69.5 G/M2
LEFT VENTRICLE SYSTOLIC VOLUME INDEX: 21.3 ML/M2
LEFT VENTRICLE SYSTOLIC VOLUME: 37.44 ML
LEFT VENTRICULAR INTERNAL DIMENSION IN DIASTOLE: 4.7 CM (ref 3.5–6)
LEFT VENTRICULAR MASS: 122.3 G
LV LATERAL E/E' RATIO: 6.87 M/S
LV SEPTAL E/E' RATIO: 11.44 M/S
LVED V (TEICH): 102.32 ML
LVES V (TEICH): 37.44 ML
LVOT MG: 2.61 MMHG
LVOT MV: 0.75 CM/S
MV PEAK A VEL: 0.71 M/S
MV PEAK E VEL: 1.03 M/S
MV STENOSIS PRESSURE HALF TIME: 43.84 MS
MV VALVE AREA P 1/2 METHOD: 5.02 CM2
PISA TR MAX VEL: 2.56 M/S
PV MEAN GRADIENT: 2 MMHG
PV PEAK GRADIENT: 5 MMHG
PV PEAK VELOCITY: 1.16 M/S
RA MAJOR: 4.7 CM
RA PRESSURE ESTIMATED: 3 MMHG
RA WIDTH: 2.47 CM
RIGHT VENTRICULAR END-DIASTOLIC DIMENSION: 3.11 CM
RV TB RVSP: 6 MMHG
SINUS: 2.76 CM
STJ: 2.27 CM
TDI LATERAL: 0.15 M/S
TDI SEPTAL: 0.09 M/S
TDI: 0.12 M/S
TR MAX PG: 26 MMHG
TRICUSPID ANNULAR PLANE SYSTOLIC EXCURSION: 1.81 CM
TV REST PULMONARY ARTERY PRESSURE: 29 MMHG
Z-SCORE OF LEFT VENTRICULAR DIMENSION IN END DIASTOLE: -0.35
Z-SCORE OF LEFT VENTRICULAR DIMENSION IN END SYSTOLE: 0.23

## 2024-12-09 PROCEDURE — 93010 ELECTROCARDIOGRAM REPORT: CPT | Mod: ,,, | Performed by: INTERNAL MEDICINE

## 2024-12-09 PROCEDURE — 93005 ELECTROCARDIOGRAM TRACING: CPT

## 2024-12-09 PROCEDURE — 93306 TTE W/DOPPLER COMPLETE: CPT | Mod: 26,,, | Performed by: INTERNAL MEDICINE

## 2024-12-09 PROCEDURE — 93306 TTE W/DOPPLER COMPLETE: CPT

## 2024-12-10 ENCOUNTER — TELEPHONE (OUTPATIENT)
Dept: CARDIOLOGY | Facility: CLINIC | Age: 39
End: 2024-12-10
Payer: MEDICAID

## 2024-12-10 LAB
OHS QRS DURATION: 78 MS
OHS QTC CALCULATION: 439 MS

## 2024-12-11 ENCOUNTER — OFFICE VISIT (OUTPATIENT)
Dept: DIABETES | Facility: CLINIC | Age: 39
End: 2024-12-11
Payer: MEDICAID

## 2024-12-11 VITALS
WEIGHT: 161.19 LBS | HEIGHT: 64 IN | DIASTOLIC BLOOD PRESSURE: 72 MMHG | HEART RATE: 66 BPM | BODY MASS INDEX: 27.52 KG/M2 | SYSTOLIC BLOOD PRESSURE: 126 MMHG

## 2024-12-11 DIAGNOSIS — F40.298 NEEDLE PHOBIA: ICD-10-CM

## 2024-12-11 DIAGNOSIS — E11.65 UNCONTROLLED TYPE 2 DIABETES MELLITUS WITH HYPERGLYCEMIA: Primary | ICD-10-CM

## 2024-12-11 LAB — GLUCOSE SERPL-MCNC: 152 MG/DL (ref 70–110)

## 2024-12-11 PROCEDURE — 99999 PR PBB SHADOW E&M-EST. PATIENT-LVL III: CPT | Mod: PBBFAC,,, | Performed by: NURSE PRACTITIONER

## 2024-12-11 PROCEDURE — 99213 OFFICE O/P EST LOW 20 MIN: CPT | Mod: PBBFAC | Performed by: NURSE PRACTITIONER

## 2024-12-11 PROCEDURE — 99999PBSHW POCT GLUCOSE, HAND-HELD DEVICE: Mod: PBBFAC,,,

## 2024-12-11 PROCEDURE — 3074F SYST BP LT 130 MM HG: CPT | Mod: CPTII,,, | Performed by: NURSE PRACTITIONER

## 2024-12-11 PROCEDURE — G2211 COMPLEX E/M VISIT ADD ON: HCPCS | Mod: S$PBB,,, | Performed by: NURSE PRACTITIONER

## 2024-12-11 PROCEDURE — 3061F NEG MICROALBUMINURIA REV: CPT | Mod: CPTII,,, | Performed by: NURSE PRACTITIONER

## 2024-12-11 PROCEDURE — 3008F BODY MASS INDEX DOCD: CPT | Mod: CPTII,,, | Performed by: NURSE PRACTITIONER

## 2024-12-11 PROCEDURE — 99214 OFFICE O/P EST MOD 30 MIN: CPT | Mod: S$PBB,,, | Performed by: NURSE PRACTITIONER

## 2024-12-11 PROCEDURE — 3078F DIAST BP <80 MM HG: CPT | Mod: CPTII,,, | Performed by: NURSE PRACTITIONER

## 2024-12-11 PROCEDURE — 3066F NEPHROPATHY DOC TX: CPT | Mod: CPTII,,, | Performed by: NURSE PRACTITIONER

## 2024-12-11 PROCEDURE — 82962 GLUCOSE BLOOD TEST: CPT | Mod: PBBFAC | Performed by: NURSE PRACTITIONER

## 2024-12-11 PROCEDURE — 4010F ACE/ARB THERAPY RXD/TAKEN: CPT | Mod: CPTII,,, | Performed by: NURSE PRACTITIONER

## 2024-12-11 PROCEDURE — 3052F HG A1C>EQUAL 8.0%<EQUAL 9.0%: CPT | Mod: CPTII,,, | Performed by: NURSE PRACTITIONER

## 2024-12-11 NOTE — PROGRESS NOTES
Peewee Soto Jr. is a 39 y.o. male who  has a past medical history of Coronary artery disease involving native coronary artery of native heart, Diabetes mellitus, Essential hypertension (3/29/2019), GERD (gastroesophageal reflux disease), Mixed hyperlipidemia (11/30/2020), NSTEMI (non-ST elevated myocardial infarction), S/P coronary artery stent placement, and Undescended testicle before puberty. and presents for a follow up evaluation of Type 2 diabetes mellitus.     CHIEF COMPLAINT: Diabetes Consultation follow up    PCP: Jessi, Primary Doctor     Initial visit with me - 4/26/2023    The patient was initially diagnosed with diabetes since age 31.      Previous failed treatments include:  None     Social Documentation:  Patient lives in [place]. 2 children ages 18 and 11, single.   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   Exercise: mostly physical activity at work, no formal exercise.   Diet: switched from regular cold drinks to diet.       Diabetes related complications:   cardiovascular disease.   denies Pancreatitis  denies Gastroparesis  denies DKA  denies Hx/family Hx of MEN2/MTC  denies Frequent UTIs/yeast infections     Diabetes Medications               empagliflozin-metformin (SYNJARDY XR) 25-1,000 mg TBph Take 1 tablet by mouth once daily.    pioglitazone (ACTOS) 15 MG tablet Take 1 tablet (15 mg total) by mouth once daily.    tirzepatide (MOUNJARO) 2.5 mg/0.5 mL PnIj Inject 2.5 mg into the skin every 7 days. - NEVER STARTED.     Current monitoring regimen: NONE. States fear of sticking fingers.    Patient currently taking insulin or sulfonylurea?  NO  Recent hypoglycemic episodes: No.     Patient compliant with glucose checks and medication administration? No    DIABETES MANAGEMENT STATUS  Statin: Not taking  ACE/ARB: Not taking  Screening or Prevention Patient's value Goal Complete/Controlled?   HgA1C Testing and Control   Lab Results   Component Value Date    HGBA1C 8.3  "(H) 09/10/2024      Annually/Less than 8% No   Lipid profile : 06/13/2024 Annually Yes   LDL control Lab Results   Component Value Date    LDLCALC 66.8 06/13/2024    Annually/Less than 100 mg/dl  Yes   Nephropathy screening Lab Results   Component Value Date    LABMICR 26.0 03/13/2024     Lab Results   Component Value Date    PROTEINUA Trace (A) 06/05/2019     No results found for: "UTPCR"   Annually No   Blood pressure BP Readings from Last 1 Encounters:   12/11/24 126/72    Less than 140/90 Yes   Dilated retinal exam Most Recent Eye Exam Date: Not Found Annually No   Foot exam   : 03/13/2024 Annually No   Patient's medications, allergies, surgical, social and family histories were reviewed and updated as appropriate.     Review of Systems   Constitutional:  Negative for weight loss.   Eyes:  Negative for blurred vision and double vision.   Cardiovascular:  Negative for chest pain.   Gastrointestinal:  Negative for nausea and vomiting.   Genitourinary:  Negative for frequency.   Musculoskeletal:  Negative for falls.   Neurological:  Negative for dizziness and weakness.   Endo/Heme/Allergies:  Negative for polydipsia.   Psychiatric/Behavioral:  Negative for depression.    All other systems reviewed and are negative.       Physical Exam  Constitutional:       General: He is not in acute distress.     Appearance: Normal appearance.   Eyes:      Extraocular Movements: Extraocular movements intact.      Pupils: Pupils are equal, round, and reactive to light.   Cardiovascular:      Rate and Rhythm: Normal rate and regular rhythm.      Heart sounds: Normal heart sounds.   Pulmonary:      Effort: Pulmonary effort is normal. No respiratory distress.      Breath sounds: Normal breath sounds.   Musculoskeletal:      Cervical back: Normal range of motion and neck supple.   Neurological:      Mental Status: He is alert and oriented to person, place, and time.   Psychiatric:         Mood and Affect: Mood normal.         " "Behavior: Behavior normal.        Blood pressure 126/72, pulse 66, height 5' 4" (1.626 m), weight 73.1 kg (161 lb 2.5 oz).  Wt Readings from Last 3 Encounters:   12/11/24 73.1 kg (161 lb 2.5 oz)   12/09/24 71.2 kg (157 lb)   10/30/24 71.3 kg (157 lb 3 oz)       LAB REVIEW  Lab Results   Component Value Date     09/10/2024    K 4.5 09/10/2024     09/10/2024    CO2 24 09/10/2024    BUN 19 09/10/2024    CREATININE 0.9 09/10/2024    CALCIUM 10.3 09/10/2024    ANIONGAP 10 09/10/2024    EGFRNORACEVR >60.0 09/10/2024     Lab Results   Component Value Date    CPEPTIDE 1.27 09/10/2024    GLUTAMICACID 0.00 09/10/2024     Hemoglobin A1C   Date Value Ref Range Status   09/10/2024 8.3 (H) 4.0 - 5.6 % Final     Comment:     ADA Screening Guidelines:  5.7-6.4%  Consistent with prediabetes  >or=6.5%  Consistent with diabetes    High levels of fetal hemoglobin interfere with the HbA1C  assay. Heterozygous hemoglobin variants (HbS, HgC, etc)do  not significantly interfere with this assay.   However, presence of multiple variants may affect accuracy.     06/13/2024 8.6 (H) 4.0 - 5.6 % Final     Comment:     ADA Screening Guidelines:  5.7-6.4%  Consistent with prediabetes  >or=6.5%  Consistent with diabetes    High levels of fetal hemoglobin interfere with the HbA1C  assay. Heterozygous hemoglobin variants (HbS, HgC, etc)do  not significantly interfere with this assay.   However, presence of multiple variants may affect accuracy.     02/12/2024 11.4 (H) 4.0 - 5.6 % Final     Comment:     ADA Screening Guidelines:  5.7-6.4%  Consistent with prediabetes  >or=6.5%  Consistent with diabetes    High levels of fetal hemoglobin interfere with the HbA1C  assay. Heterozygous hemoglobin variants (HbS, HgC, etc)do  not significantly interfere with this assay.   However, presence of multiple variants may affect accuracy.         Lab Results   Component Value Date    POCGLU 152 (A) 12/11/2024       ASSESSMENT    ICD-10-CM ICD-9-CM   1. " "Uncontrolled type 2 diabetes mellitus with hyperglycemia  E11.65 250.02   2. Needle phobia  F40.298 300.29       RHIANNA Vides was seen today for diabetes mellitus.    Diagnoses and all orders for this visit:    Uncontrolled type 2 diabetes mellitus with hyperglycemia  -     POCT Glucose, Hand-Held Device  -     Basic Metabolic Panel; Future  -     Hemoglobin A1C; Future  -     Ambulatory referral/consult to Diabetes Education; Future    Needle phobia        Reviewed pathophysiology of diabetes, complications related to the disease, importance of annual dilated eye exam and daily foot examination. Explained SE BARON, dosage of medications. Written instructions given and reviewed with patient and patient verbalizes understanding.     3/13/2024 - out of medications for 1 month. Last visit with me in April 2023. A1c up to 11.4. discussed that if glucoses are not better controlled he will likely require insulin. Patient states "I can't give myself injections". Has 2 glucose meters at home, does not check. Needs to check glucose 1-2 times a day. Will refill medications and f/u in 6 weeks.     6/18/2024 - A1c improved to 8.4. amenable to trying Mounjaro due to not being able to see the needle. Discussed diet, avoiding sweet tea.    9/4/2024 - he states he attempted to take the trulicity, but didn't successfully inject the medication and does not know if he got any. Has not tried again. He will have a family member administer. He drank a regular cold drink this morning, resulting in glucose over 200. Discussed dietary changes.     10/30/2024 - still did not start Trulicity nor checking glucoses. No changes to diet other than no regular cold drinks. Discussed protein options today. Will send mounjaro to replace trulicity, encouraged patient to try. F/u 6 wks.     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 Does not check glucoses due to needle phobia. Has not made changes to diet. Will check A1c today. Increase " actos to 30 mg. .     PATIENT INSTRUCTIONS     You are overdue for your yearly diabetic eye exam. Please schedule an appointment with your eye care provider at your earliest convenience. If your eye care provider is outside of the [facility] system, please have them fax a report of the exam to [facility] Fax [phone].      Lifestyle modification with well balanced diet and at least 30 minutes of physical activity daily recommended.   Increase water intake.   Increase protein and non-starchy vegetable servings with meals.   Decrease carbohydrate servings with meals.   Take 10 minute walk after each meal.   Avoid snacking on carbohydrates, choose low carb, high protein snacks.   Incorporate resistance training with weights or resistance bands with exercise regimen at least 3 days a week.       Labs ordered and will be scheduled by [facility] staff.       Continue Synjardy XR  mg by mouth daily.   Increase Actos to 30 mg by mouth daily.      Check blood sugar twice daily. Every morning when you wake up and 1-2 hours after main meal of the day. Keep log and upload to [facility] prior to each visit.     Blood Sugar Goals:       Fastin-130.       1-2 hours after a meal: Less than 180.      Follow up in about 3 months (around 3/11/2025) for No Device, Schedule fasting labs.    Portions of this note were prepared with Doc[employer]oc Naturally Speaking voice recognition transcription software. Grammatical errors, including garbled syntax, mangle pronouns, and other bizarre constructions may be attributed to that software system.

## 2024-12-11 NOTE — PATIENT INSTRUCTIONS
PATIENT INSTRUCTIONS     You are overdue for your yearly diabetic eye exam. Please schedule an appointment with your eye care provider at your earliest convenience. If your eye care provider is outside of the [facility] system, please have them fax a report of the exam to [facility] Fax [phone].      Lifestyle modification with well balanced diet and at least 30 minutes of physical activity daily recommended.   Increase water intake.   Increase protein and non-starchy vegetable servings with meals.   Decrease carbohydrate servings with meals.   Take 10 minute walk after each meal.   Avoid snacking on carbohydrates, choose low carb, high protein snacks.   Incorporate resistance training with weights or resistance bands with exercise regimen at least 3 days a week.       Labs ordered and will be scheduled by [facility] staff.       Continue Synjardy XR  mg by mouth daily.   Increase Actos to 30 mg by mouth daily.      Check blood sugar twice daily. Every morning when you wake up and 1-2 hours after main meal of the day. Keep log and upload to [facility] prior to each visit.     Blood Sugar Goals:       Fastin-130.       1-2 hours after a meal: Less than 180.

## 2025-02-26 DIAGNOSIS — E11.65 UNCONTROLLED TYPE 2 DIABETES MELLITUS WITH HYPERGLYCEMIA: ICD-10-CM

## 2025-02-26 RX ORDER — EMPAGLIFLOZIN, METFORMIN HYDROCHLORIDE 25; 1000 MG/1; MG/1
1 TABLET, EXTENDED RELEASE ORAL DAILY
Qty: 90 TABLET | Refills: 0 | Status: SHIPPED | OUTPATIENT
Start: 2025-02-26 | End: 2026-02-26

## 2025-04-23 ENCOUNTER — LAB VISIT (OUTPATIENT)
Dept: LAB | Facility: HOSPITAL | Age: 40
End: 2025-04-23
Attending: NURSE PRACTITIONER
Payer: MEDICAID

## 2025-04-23 ENCOUNTER — OFFICE VISIT (OUTPATIENT)
Dept: DIABETES | Facility: CLINIC | Age: 40
End: 2025-04-23
Payer: MEDICAID

## 2025-04-23 VITALS
WEIGHT: 156.06 LBS | HEART RATE: 70 BPM | DIASTOLIC BLOOD PRESSURE: 86 MMHG | BODY MASS INDEX: 26.79 KG/M2 | SYSTOLIC BLOOD PRESSURE: 130 MMHG

## 2025-04-23 DIAGNOSIS — I25.118 CORONARY ARTERY DISEASE OF NATIVE ARTERY OF NATIVE HEART WITH STABLE ANGINA PECTORIS: ICD-10-CM

## 2025-04-23 DIAGNOSIS — E11.65 UNCONTROLLED TYPE 2 DIABETES MELLITUS WITH HYPERGLYCEMIA: Primary | ICD-10-CM

## 2025-04-23 DIAGNOSIS — E11.65 UNCONTROLLED TYPE 2 DIABETES MELLITUS WITH HYPERGLYCEMIA: ICD-10-CM

## 2025-04-23 LAB
ALBUMIN/CREAT UR: 16.3 UG/MG
ANION GAP (OHS): 9 MMOL/L (ref 8–16)
BUN SERPL-MCNC: 16 MG/DL (ref 6–20)
CALCIUM SERPL-MCNC: 10.8 MG/DL (ref 8.7–10.5)
CHLORIDE SERPL-SCNC: 105 MMOL/L (ref 95–110)
CO2 SERPL-SCNC: 26 MMOL/L (ref 23–29)
CREAT SERPL-MCNC: 0.9 MG/DL (ref 0.5–1.4)
CREAT UR-MCNC: 80 MG/DL (ref 23–375)
EAG (OHS): 229 MG/DL (ref 68–131)
GFR SERPLBLD CREATININE-BSD FMLA CKD-EPI: >60 ML/MIN/1.73/M2
GLUCOSE SERPL-MCNC: 154 MG/DL (ref 70–110)
HBA1C MFR BLD: 9.6 % (ref 4–5.6)
MICROALBUMIN UR-MCNC: 13 UG/ML (ref ?–5000)
POTASSIUM SERPL-SCNC: 4.2 MMOL/L (ref 3.5–5.1)
SODIUM SERPL-SCNC: 140 MMOL/L (ref 136–145)

## 2025-04-23 PROCEDURE — 4010F ACE/ARB THERAPY RXD/TAKEN: CPT | Mod: CPTII,,, | Performed by: NURSE PRACTITIONER

## 2025-04-23 PROCEDURE — 3061F NEG MICROALBUMINURIA REV: CPT | Mod: CPTII,,, | Performed by: NURSE PRACTITIONER

## 2025-04-23 PROCEDURE — 36415 COLL VENOUS BLD VENIPUNCTURE: CPT

## 2025-04-23 PROCEDURE — 83036 HEMOGLOBIN GLYCOSYLATED A1C: CPT

## 2025-04-23 PROCEDURE — 1159F MED LIST DOCD IN RCRD: CPT | Mod: CPTII,,, | Performed by: NURSE PRACTITIONER

## 2025-04-23 PROCEDURE — 80048 BASIC METABOLIC PNL TOTAL CA: CPT

## 2025-04-23 PROCEDURE — 3079F DIAST BP 80-89 MM HG: CPT | Mod: CPTII,,, | Performed by: NURSE PRACTITIONER

## 2025-04-23 PROCEDURE — 3008F BODY MASS INDEX DOCD: CPT | Mod: CPTII,,, | Performed by: NURSE PRACTITIONER

## 2025-04-23 PROCEDURE — 3075F SYST BP GE 130 - 139MM HG: CPT | Mod: CPTII,,, | Performed by: NURSE PRACTITIONER

## 2025-04-23 PROCEDURE — 99214 OFFICE O/P EST MOD 30 MIN: CPT | Mod: S$PBB,,, | Performed by: NURSE PRACTITIONER

## 2025-04-23 PROCEDURE — 99999 PR PBB SHADOW E&M-EST. PATIENT-LVL III: CPT | Mod: PBBFAC,,, | Performed by: NURSE PRACTITIONER

## 2025-04-23 PROCEDURE — 82570 ASSAY OF URINE CREATININE: CPT

## 2025-04-23 PROCEDURE — 3066F NEPHROPATHY DOC TX: CPT | Mod: CPTII,,, | Performed by: NURSE PRACTITIONER

## 2025-04-23 PROCEDURE — 99213 OFFICE O/P EST LOW 20 MIN: CPT | Mod: PBBFAC | Performed by: NURSE PRACTITIONER

## 2025-04-23 PROCEDURE — G2211 COMPLEX E/M VISIT ADD ON: HCPCS | Mod: S$PBB,,, | Performed by: NURSE PRACTITIONER

## 2025-04-23 PROCEDURE — 3046F HEMOGLOBIN A1C LEVEL >9.0%: CPT | Mod: CPTII,,, | Performed by: NURSE PRACTITIONER

## 2025-04-23 RX ORDER — PIOGLITAZONE 30 MG/1
30 TABLET ORAL DAILY
Qty: 90 TABLET | Refills: 3 | Status: SHIPPED | OUTPATIENT
Start: 2025-04-23 | End: 2026-04-23

## 2025-04-23 RX ORDER — LINAGLIPTIN 5 MG/1
5 TABLET, FILM COATED ORAL DAILY
Qty: 90 TABLET | Refills: 3 | Status: SHIPPED | OUTPATIENT
Start: 2025-04-23 | End: 2026-04-23

## 2025-04-23 NOTE — PROGRESS NOTES
Peewee Soto Jr. is a 40 y.o. male who  has a past medical history of Coronary artery disease involving native coronary artery of native heart, Diabetes mellitus, Essential hypertension (3/29/2019), GERD (gastroesophageal reflux disease), Mixed hyperlipidemia (11/30/2020), NSTEMI (non-ST elevated myocardial infarction), S/P coronary artery stent placement, and Undescended testicle before puberty. and presents for a follow up evaluation of Type 2 diabetes mellitus.     CHIEF COMPLAINT: Diabetes Consultation follow up    PCP: No, Primary Doctor     Initial visit with me - 4/26/2023    The patient was initially diagnosed with diabetes since age 31.      Previous failed treatments include:  None     Social Documentation:  Patient lives in [place]. 2 children ages 18 and 11, single.   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   Exercise: mostly physical activity at work, no formal exercise.   Diet: switched from regular cold drinks to diet.       Diabetes related complications:   cardiovascular disease.   denies Pancreatitis  denies Gastroparesis  denies DKA  denies Hx/family Hx of MEN2/MTC  denies Frequent UTIs/yeast infections     Diabetes Medications              pioglitazone (ACTOS) 15 MG tablet Take 1 tablet (15 mg total) by mouth once daily. - NOT TAKING, THOUGHT HE WAS SUPPOSED TO STOP.     SYNJARDY XR 25-1,000 mg TBph Take 1 tablet by mouth once daily          Current monitoring regimen: NONE. States fear of sticking fingers.    Patient currently taking insulin or sulfonylurea?  NO  Recent hypoglycemic episodes: No.     Patient compliant with glucose checks and medication administration? No    DIABETES MANAGEMENT STATUS  Statin: Not taking  ACE/ARB: Not taking  Screening or Prevention Patient's value Goal Complete/Controlled?   HgA1C Testing and Control   Lab Results   Component Value Date    HGBA1C 8.3 (H) 09/10/2024      Annually/Less than 8% No   Lipid profile : 06/13/2024  3/21/2025          Christine Chanel Ines Francis    [address] [address]         Dear Christine,    Our records indicate that the tests ordered for you by XIAO Carlson  have not been done.  If you have, in fact, already completed the tests or you do not wish to have the tests done, please contact our office at THE NUMBER LISTED BELOW.  Otherwise, please proceed with the testing.  Enclosed is a duplicate order for your convenience.  Labs order   Orders Placed on 2/6/2025  Helicobacter Pylori Breath Test, Adult --Please go to the lab to complete this test. Make sure you do not take a PPI (omeprazole/pantoprazole) or H2 blocker (famotidine) medication for 2 weeks prior to the test as this could result in a false negative result. Also do not eat one hour prior to the test.It is nothing to eat or drink (including chewing gum) 1 hour prior to testing. They will have you drink a special solution containing urea and have you blow into a bag      H-pylori breath test     This test requires the adult patient (>17 years of age) to fast for 1 hour prior to test administration. The patient should not have taken antibiotics, proton pump inhibitors (e.g., Prilosec, Prevacid, Aciphex, Nexium), or bismuth preparations (e.g., Pepto-Bismol) within the previous 14 days.   The effect of histamine 2-receptor antogonists (e.g., Axid, Pepcid, Tagamet, Zantac) may reduce urease activity on urea breath tests and should be discontinued for 24-48 hours before the sample is collected.   When used to monitor treatment, the test should be performed four weeks after cessation of definitive therapy. The patient should be informed that the Pranactin-Citric drink that will be administered contains phenylalanine. Phenylketonurics restrict dietary phenylalanine.             To schedule a test at any [facility], call Central Scheduling at [phone] /[phone],  "Annually Yes   LDL control Lab Results   Component Value Date    LDLCALC 66.8 06/13/2024    Annually/Less than 100 mg/dl  Yes   Nephropathy screening Lab Results   Component Value Date    LABMICR 26.0 03/13/2024     Lab Results   Component Value Date    PROTEINUA Trace (A) 06/05/2019     No results found for: "UTPCR"   Annually No   Blood pressure BP Readings from Last 1 Encounters:   04/23/25 130/86    Less than 140/90 Yes   Dilated retinal exam Most Recent Eye Exam Date: Not Found Annually No   Foot exam   : 04/23/2025 Annually No   Patient's medications, allergies, surgical, social and family histories were reviewed and updated as appropriate.     Review of Systems   Constitutional:  Negative for weight loss.   Eyes:  Negative for blurred vision and double vision.   Cardiovascular:  Negative for chest pain.   Gastrointestinal:  Negative for nausea and vomiting.   Genitourinary:  Negative for frequency.   Musculoskeletal:  Negative for falls.   Neurological:  Negative for dizziness and weakness.   Endo/Heme/Allergies:  Negative for polydipsia.   Psychiatric/Behavioral:  Negative for depression.    All other systems reviewed and are negative.       Physical Exam  Constitutional:       General: He is not in acute distress.     Appearance: Normal appearance.   Eyes:      Extraocular Movements: Extraocular movements intact.      Pupils: Pupils are equal, round, and reactive to light.   Cardiovascular:      Rate and Rhythm: Normal rate and regular rhythm.      Pulses:           Dorsalis pedis pulses are 2+ on the right side and 2+ on the left side.        Posterior tibial pulses are 2+ on the right side and 2+ on the left side.      Heart sounds: Normal heart sounds.   Pulmonary:      Effort: Pulmonary effort is normal. No respiratory distress.      Breath sounds: Normal breath sounds.   Musculoskeletal:      Cervical back: Normal range of motion and neck supple.   Feet:      Right foot:      Protective Sensation:   10 " Monday through Friday between 7:30am to 6pm and on Saturday between 8am and 1pm.   Evening and weekend appointments for your exam are available.           Sincerely,    Salma Chen, XIAO  [facility], [address]  [facility] [address]  [phone]           sites sensed.      Skin integrity: Skin integrity normal.      Toenail Condition: Right toenails are normal.      Left foot:      Protective Sensation:   10 sites sensed.      Skin integrity: Skin integrity normal.      Toenail Condition: Left toenails are normal.   Neurological:      Mental Status: He is alert and oriented to person, place, and time.   Psychiatric:         Mood and Affect: Mood normal.         Behavior: Behavior normal.        Blood pressure 130/86, pulse 70, weight 70.8 kg (156 lb 1.4 oz).  Wt Readings from Last 3 Encounters:   04/23/25 70.8 kg (156 lb 1.4 oz)   12/11/24 73.1 kg (161 lb 2.5 oz)   12/09/24 71.2 kg (157 lb)       LAB REVIEW  Lab Results   Component Value Date     09/10/2024    K 4.5 09/10/2024     09/10/2024    CO2 24 09/10/2024    BUN 19 09/10/2024    CREATININE 0.9 09/10/2024    CALCIUM 10.3 09/10/2024    ANIONGAP 10 09/10/2024    EGFRNORACEVR >60.0 09/10/2024     Lab Results   Component Value Date    CPEPTIDE 1.27 09/10/2024    GLUTAMICACID 0.00 09/10/2024     Hemoglobin A1C   Date Value Ref Range Status   09/10/2024 8.3 (H) 4.0 - 5.6 % Final     Comment:     ADA Screening Guidelines:  5.7-6.4%  Consistent with prediabetes  >or=6.5%  Consistent with diabetes    High levels of fetal hemoglobin interfere with the HbA1C  assay. Heterozygous hemoglobin variants (HbS, HgC, etc)do  not significantly interfere with this assay.   However, presence of multiple variants may affect accuracy.     06/13/2024 8.6 (H) 4.0 - 5.6 % Final     Comment:     ADA Screening Guidelines:  5.7-6.4%  Consistent with prediabetes  >or=6.5%  Consistent with diabetes    High levels of fetal hemoglobin interfere with the HbA1C  assay. Heterozygous hemoglobin variants (HbS, HgC, etc)do  not significantly interfere with this assay.   However, presence of multiple variants may affect accuracy.     02/12/2024 11.4 (H) 4.0 - 5.6 % Final     Comment:     ADA Screening Guidelines:  5.7-6.4%  Consistent  "with prediabetes  >or=6.5%  Consistent with diabetes    High levels of fetal hemoglobin interfere with the HbA1C  assay. Heterozygous hemoglobin variants (HbS, HgC, etc)do  not significantly interfere with this assay.   However, presence of multiple variants may affect accuracy.         Lab Results   Component Value Date    POCGLU 152 (A) 12/11/2024       ASSESSMENT    ICD-10-CM ICD-9-CM   1. Uncontrolled type 2 diabetes mellitus with hyperglycemia  E11.65 250.02   2. Coronary artery disease of native artery of native heart with stable angina pectoris  I25.118 414.01     413.9       PLAN  Diagnoses and all orders for this visit:    Uncontrolled type 2 diabetes mellitus with hyperglycemia  -     Cancel: IA-2 Antibody; Future  -     POCT Glucose, Hand-Held Device  -     Microalbumin/Creatinine Ratio, Urine; Future  -     pioglitazone (ACTOS) 30 MG tablet; Take 1 tablet (30 mg total) by mouth once daily.  -     linaGLIPtin (TRADJENTA) 5 mg Tab tablet; Take 1 tablet (5 mg total) by mouth once daily.    Coronary artery disease of native artery of native heart with stable angina pectoris  -     pioglitazone (ACTOS) 30 MG tablet; Take 1 tablet (30 mg total) by mouth once daily.        Reviewed pathophysiology of diabetes, complications related to the disease, importance of annual dilated eye exam and daily foot examination. Explained MOA, SE, dosage of medications. Written instructions given and reviewed with patient and patient verbalizes understanding.     3/13/2024 - out of medications for 1 month. Last visit with me in April 2023. A1c up to 11.4. discussed that if glucoses are not better controlled he will likely require insulin. Patient states "I can't give myself injections". Has 2 glucose meters at home, does not check. Needs to check glucose 1-2 times a day. Will refill medications and f/u in 6 weeks.     6/18/2024 - A1c improved to 8.4. amenable to trying Mounjaro due to not being able to see the needle. Discussed " diet, avoiding sweet tea.    9/4/2024 - he states he attempted to take the trulicity, but didn't successfully inject the medication and does not know if he got any. Has not tried again. He will have a family member administer. He drank a regular cold drink this morning, resulting in glucose over 200. Discussed dietary changes.     10/30/2024 - still did not start Trulicity nor checking glucoses. No changes to diet other than no regular cold drinks. Discussed protein options today. Will send mounjaro to replace trulicity, encouraged patient to try. F/u 6 wks.     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 Does not check glucoses due to needle phobia. Has not made changes to diet. Will check A1c today. Increase actos to 30 mg.       PATIENT INSTRUCTIONS     You are overdue for your yearly diabetic eye exam. Please schedule an appointment with your eye care provider at your earliest convenience. If your eye care provider is outside of the [facility] system, please have them fax a report of the exam to [facility] Fax [phone].      Lifestyle modification with well balanced diet and at least 30 minutes of physical activity daily recommended.   Increase water intake.   Increase protein and non-starchy vegetable servings with meals.   Decrease carbohydrate servings with meals.   Take 10 minute walk after each meal.   Avoid snacking on carbohydrates, choose low carb, high protein snacks.   Incorporate resistance training with weights or resistance bands with exercise regimen at least 3 days a week.       Labs ordered and will be scheduled by [facility] staff.       Continue Synjardy XR  mg by mouth daily.   Increase Actos to 30 mg by mouth daily.   Start Tradjenta 5 mg by mouth daily.      Check blood sugar twice daily. Every morning when you wake up and 1-2 hours after main meal of the day. Keep log and upload to [facility] prior to each visit.     Blood Sugar Goals:        Fastin-130.       1-2 hours after a meal: Less than 180.      Follow up in about 3 months (around 2025) for No Device, Schedule non-fasting labs.    Portions of this note were prepared with [employer] Naturally Speaking voice recognition transcription software. Grammatical errors, including garbled syntax, mangle pronouns, and other bizarre constructions may be attributed to that software system.

## 2025-04-23 NOTE — PATIENT INSTRUCTIONS
PATIENT INSTRUCTIONS     You are overdue for your yearly diabetic eye exam. Please schedule an appointment with your eye care provider at your earliest convenience. If your eye care provider is outside of the [facility] system, please have them fax a report of the exam to [facility] Fax [phone].      Lifestyle modification with well balanced diet and at least 30 minutes of physical activity daily recommended.   Increase water intake.   Increase protein and non-starchy vegetable servings with meals.   Decrease carbohydrate servings with meals.   Take 10 minute walk after each meal.   Avoid snacking on carbohydrates, choose low carb, high protein snacks.   Incorporate resistance training with weights or resistance bands with exercise regimen at least 3 days a week.       Labs ordered and will be scheduled by [facility] staff.       Continue Synjardy XR  mg by mouth daily.   Increase Actos to 30 mg by mouth daily.   Start Tradjenta 5 mg by mouth daily.      Check blood sugar twice daily. Every morning when you wake up and 1-2 hours after main meal of the day. Keep log and upload to [employer] prior to each visit.     Blood Sugar Goals:       Fastin-130.       1-2 hours after a meal: Less than 180.

## 2025-04-24 ENCOUNTER — RESULTS FOLLOW-UP (OUTPATIENT)
Dept: DIABETES | Facility: CLINIC | Age: 40
End: 2025-04-24

## 2025-05-22 ENCOUNTER — TELEPHONE (OUTPATIENT)
Dept: CARDIOLOGY | Facility: CLINIC | Age: 40
End: 2025-05-22
Payer: MEDICAID

## 2025-05-22 DIAGNOSIS — I25.118 CORONARY ARTERY DISEASE OF NATIVE ARTERY OF NATIVE HEART WITH STABLE ANGINA PECTORIS: ICD-10-CM

## 2025-05-22 DIAGNOSIS — E11.65 UNCONTROLLED TYPE 2 DIABETES MELLITUS WITH HYPERGLYCEMIA: ICD-10-CM

## 2025-05-22 RX ORDER — METOPROLOL SUCCINATE 100 MG/1
100 TABLET, EXTENDED RELEASE ORAL
Qty: 90 TABLET | Refills: 0 | Status: SHIPPED | OUTPATIENT
Start: 2025-05-22

## 2025-05-22 NOTE — TELEPHONE ENCOUNTER
Appt made        ----- Message from MIKE Foster sent at 5/22/2025  1:30 PM CDT -----  Please phone patientPatient needs cards follow up appt arranged.Rocio

## 2025-06-14 DIAGNOSIS — E11.65 UNCONTROLLED TYPE 2 DIABETES MELLITUS WITH HYPERGLYCEMIA: ICD-10-CM

## 2025-06-16 RX ORDER — EMPAGLIFLOZIN, METFORMIN HYDROCHLORIDE 25; 1000 MG/1; MG/1
1 TABLET, EXTENDED RELEASE ORAL DAILY
Qty: 90 TABLET | Refills: 0 | Status: SHIPPED | OUTPATIENT
Start: 2025-06-16 | End: 2026-06-16

## 2025-07-23 ENCOUNTER — LAB VISIT (OUTPATIENT)
Dept: LAB | Facility: HOSPITAL | Age: 40
End: 2025-07-23
Attending: NURSE PRACTITIONER
Payer: MEDICAID

## 2025-07-23 ENCOUNTER — OFFICE VISIT (OUTPATIENT)
Dept: DIABETES | Facility: CLINIC | Age: 40
End: 2025-07-23
Payer: MEDICAID

## 2025-07-23 VITALS
HEART RATE: 69 BPM | WEIGHT: 161.63 LBS | BODY MASS INDEX: 27.74 KG/M2 | SYSTOLIC BLOOD PRESSURE: 127 MMHG | DIASTOLIC BLOOD PRESSURE: 83 MMHG

## 2025-07-23 DIAGNOSIS — E11.65 UNCONTROLLED TYPE 2 DIABETES MELLITUS WITH HYPERGLYCEMIA: ICD-10-CM

## 2025-07-23 DIAGNOSIS — Z95.5 S/P CORONARY ARTERY STENT PLACEMENT: ICD-10-CM

## 2025-07-23 DIAGNOSIS — E78.2 MIXED HYPERLIPIDEMIA: ICD-10-CM

## 2025-07-23 DIAGNOSIS — I25.118 CORONARY ARTERY DISEASE OF NATIVE ARTERY OF NATIVE HEART WITH STABLE ANGINA PECTORIS: ICD-10-CM

## 2025-07-23 DIAGNOSIS — I10 ESSENTIAL HYPERTENSION: ICD-10-CM

## 2025-07-23 DIAGNOSIS — E11.65 UNCONTROLLED TYPE 2 DIABETES MELLITUS WITH HYPERGLYCEMIA: Primary | ICD-10-CM

## 2025-07-23 LAB
ALBUMIN SERPL BCP-MCNC: 4.4 G/DL (ref 3.5–5.2)
ALP SERPL-CCNC: 72 UNIT/L (ref 40–150)
ALT SERPL W/O P-5'-P-CCNC: 28 UNIT/L (ref 10–44)
ANION GAP (OHS): 7 MMOL/L (ref 8–16)
AST SERPL-CCNC: 18 UNIT/L (ref 11–45)
BILIRUB SERPL-MCNC: 0.5 MG/DL (ref 0.1–1)
BUN SERPL-MCNC: 22 MG/DL (ref 6–20)
CALCIUM SERPL-MCNC: 10.1 MG/DL (ref 8.7–10.5)
CHLORIDE SERPL-SCNC: 106 MMOL/L (ref 95–110)
CHOLEST SERPL-MCNC: 133 MG/DL (ref 120–199)
CHOLEST/HDLC SERPL: 2.1 {RATIO} (ref 2–5)
CO2 SERPL-SCNC: 26 MMOL/L (ref 23–29)
CREAT SERPL-MCNC: 1 MG/DL (ref 0.5–1.4)
EAG (OHS): 177 MG/DL (ref 68–131)
GFR SERPLBLD CREATININE-BSD FMLA CKD-EPI: >60 ML/MIN/1.73/M2
GLUCOSE SERPL-MCNC: 140 MG/DL (ref 70–110)
GLUCOSE SERPL-MCNC: 144 MG/DL (ref 70–110)
HBA1C MFR BLD: 7.8 % (ref 4–5.6)
HDLC SERPL-MCNC: 63 MG/DL (ref 40–75)
HDLC SERPL: 47.4 % (ref 20–50)
LDLC SERPL CALC-MCNC: 50.8 MG/DL (ref 63–159)
NONHDLC SERPL-MCNC: 70 MG/DL
POTASSIUM SERPL-SCNC: 5.2 MMOL/L (ref 3.5–5.1)
PROT SERPL-MCNC: 7.5 GM/DL (ref 6–8.4)
SODIUM SERPL-SCNC: 139 MMOL/L (ref 136–145)
TRIGL SERPL-MCNC: 96 MG/DL (ref 30–150)

## 2025-07-23 PROCEDURE — 3066F NEPHROPATHY DOC TX: CPT | Mod: CPTII,,, | Performed by: NURSE PRACTITIONER

## 2025-07-23 PROCEDURE — 3074F SYST BP LT 130 MM HG: CPT | Mod: CPTII,,, | Performed by: NURSE PRACTITIONER

## 2025-07-23 PROCEDURE — 82310 ASSAY OF CALCIUM: CPT

## 2025-07-23 PROCEDURE — 99213 OFFICE O/P EST LOW 20 MIN: CPT | Mod: PBBFAC | Performed by: NURSE PRACTITIONER

## 2025-07-23 PROCEDURE — 3079F DIAST BP 80-89 MM HG: CPT | Mod: CPTII,,, | Performed by: NURSE PRACTITIONER

## 2025-07-23 PROCEDURE — 3061F NEG MICROALBUMINURIA REV: CPT | Mod: CPTII,,, | Performed by: NURSE PRACTITIONER

## 2025-07-23 PROCEDURE — 4010F ACE/ARB THERAPY RXD/TAKEN: CPT | Mod: CPTII,,, | Performed by: NURSE PRACTITIONER

## 2025-07-23 PROCEDURE — 80061 LIPID PANEL: CPT

## 2025-07-23 PROCEDURE — 99999 PR PBB SHADOW E&M-EST. PATIENT-LVL III: CPT | Mod: PBBFAC,,, | Performed by: NURSE PRACTITIONER

## 2025-07-23 PROCEDURE — 3008F BODY MASS INDEX DOCD: CPT | Mod: CPTII,,, | Performed by: NURSE PRACTITIONER

## 2025-07-23 PROCEDURE — 99214 OFFICE O/P EST MOD 30 MIN: CPT | Mod: S$PBB,,, | Performed by: NURSE PRACTITIONER

## 2025-07-23 PROCEDURE — 82962 GLUCOSE BLOOD TEST: CPT | Mod: PBBFAC | Performed by: NURSE PRACTITIONER

## 2025-07-23 PROCEDURE — 36415 COLL VENOUS BLD VENIPUNCTURE: CPT

## 2025-07-23 PROCEDURE — G2211 COMPLEX E/M VISIT ADD ON: HCPCS | Mod: ,,, | Performed by: NURSE PRACTITIONER

## 2025-07-23 PROCEDURE — 99999PBSHW POCT GLUCOSE, HAND-HELD DEVICE: Mod: PBBFAC,,,

## 2025-07-23 PROCEDURE — 83036 HEMOGLOBIN GLYCOSYLATED A1C: CPT

## 2025-07-23 PROCEDURE — 1159F MED LIST DOCD IN RCRD: CPT | Mod: CPTII,,, | Performed by: NURSE PRACTITIONER

## 2025-07-23 PROCEDURE — 3051F HG A1C>EQUAL 7.0%<8.0%: CPT | Mod: CPTII,,, | Performed by: NURSE PRACTITIONER

## 2025-07-23 RX ORDER — EMPAGLIFLOZIN, METFORMIN HYDROCHLORIDE 25; 1000 MG/1; MG/1
1 TABLET, EXTENDED RELEASE ORAL DAILY
Qty: 90 TABLET | Refills: 3 | Status: SHIPPED | OUTPATIENT
Start: 2025-07-23 | End: 2026-07-23

## 2025-07-23 RX ORDER — PIOGLITAZONE 45 MG/1
45 TABLET ORAL DAILY
Qty: 90 TABLET | Refills: 3 | Status: SHIPPED | OUTPATIENT
Start: 2025-07-23 | End: 2026-07-23

## 2025-07-23 NOTE — PATIENT INSTRUCTIONS
PATIENT INSTRUCTIONS     Labs today.     Lifestyle modification with well balanced diet and at least 30 minutes of physical activity daily recommended.   Increase water intake.   Increase protein and non-starchy vegetable servings with meals.   Decrease carbohydrate servings with meals.   Take 10 minute walk after each meal.   Avoid snacking on carbohydrates, choose low carb, high protein snacks.   Incorporate resistance training with weights or resistance bands with exercise regimen at least 3 days a week.        Continue Synjardy XR  mg by mouth daily.   Increase Actos to 45 mg by mouth daily.   Continue Tradjenta 5 mg by mouth daily.      Check blood sugar twice daily. Every morning when you wake up and 1-2 hours after main meal of the day. Keep log and upload to [employer] prior to each visit.     Blood Sugar Goals:       Fastin-130.       1-2 hours after a meal: Less than 180.

## 2025-07-23 NOTE — PROGRESS NOTES
Peewee Soto Jr. is a 40 y.o. male who  has a past medical history of Coronary artery disease involving native coronary artery of native heart, Diabetes mellitus, Essential hypertension (3/29/2019), GERD (gastroesophageal reflux disease), Mixed hyperlipidemia (11/30/2020), NSTEMI (non-ST elevated myocardial infarction), S/P coronary artery stent placement, and Undescended testicle before puberty. and presents for a follow up evaluation of Type 2 diabetes mellitus.     CHIEF COMPLAINT: Diabetes Consultation follow up    PCP: No, Primary Doctor     Initial visit with me - 4/26/2023    The patient was initially diagnosed with diabetes since age 31.      Previous failed treatments include:  None     Social Documentation:  Patient lives in [place]. 2 children ages 18 and 11, single.   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 .   Exercise: mostly physical activity at work, no formal exercise.   Diet: switched from regular cold drinks to diet.       Diabetes related complications:   cardiovascular disease.   denies Pancreatitis  denies Gastroparesis  denies DKA  denies Hx/family Hx of MEN2/MTC  denies Frequent UTIs/yeast infections     Diabetes Medications              pioglitazone (ACTOS) 15 MG tablet Take 1 tablet (15 mg total) by mouth once daily. - NOT TAKING, THOUGHT HE WAS SUPPOSED TO STOP.     SYNJARDY XR 25-1,000 mg TBph Take 1 tablet by mouth once daily          Current monitoring regimen: NONE. States fear of sticking fingers.    Patient currently taking insulin or sulfonylurea?  NO  Recent hypoglycemic episodes: No.     Patient compliant with glucose checks and medication administration? No    DIABETES MANAGEMENT STATUS  Statin: Not taking  ACE/ARB: Not taking  Screening or Prevention Patient's value Goal Complete/Controlled?   HgA1C Testing and Control   Lab Results   Component Value Date    HGBA1C 9.6 (H) 04/23/2025      Annually/Less than 8% No   Lipid profile : 06/13/2024  "Annually Yes   LDL control Lab Results   Component Value Date    LDLCALC 66.8 06/13/2024    Annually/Less than 100 mg/dl  Yes   Nephropathy screening Lab Results   Component Value Date    LABMICR 13.0 04/23/2025     Lab Results   Component Value Date    PROTEINUA Trace (A) 06/05/2019     No results found for: "UTPCR"   Annually No   Blood pressure BP Readings from Last 1 Encounters:   07/23/25 127/83    Less than 140/90 Yes   Dilated retinal exam Most Recent Eye Exam Date: Not Found Annually No   Foot exam   : 07/23/2025 Annually No   Patient's medications, allergies, surgical, social and family histories were reviewed and updated as appropriate.     Review of Systems   Constitutional:  Negative for weight loss.   Eyes:  Negative for blurred vision and double vision.   Cardiovascular:  Negative for chest pain.   Gastrointestinal:  Negative for nausea and vomiting.   Genitourinary:  Negative for frequency.   Musculoskeletal:  Negative for falls.   Neurological:  Negative for dizziness and weakness.   Endo/Heme/Allergies:  Negative for polydipsia.   Psychiatric/Behavioral:  Negative for depression.    All other systems reviewed and are negative.       Physical Exam  Constitutional:       General: He is not in acute distress.     Appearance: Normal appearance.   Eyes:      Extraocular Movements: Extraocular movements intact.      Pupils: Pupils are equal, round, and reactive to light.   Cardiovascular:      Rate and Rhythm: Normal rate and regular rhythm.      Pulses:           Dorsalis pedis pulses are 2+ on the right side and 2+ on the left side.        Posterior tibial pulses are 2+ on the right side and 2+ on the left side.      Heart sounds: Normal heart sounds.   Pulmonary:      Effort: Pulmonary effort is normal. No respiratory distress.      Breath sounds: Normal breath sounds.   Musculoskeletal:      Cervical back: Normal range of motion and neck supple.   Feet:      Right foot:      Protective Sensation:   10 " sites sensed.      Skin integrity: Skin integrity normal.      Toenail Condition: Right toenails are normal.      Left foot:      Protective Sensation:   10 sites sensed.      Skin integrity: Skin integrity normal.      Toenail Condition: Left toenails are normal.   Neurological:      Mental Status: He is alert and oriented to person, place, and time.   Psychiatric:         Mood and Affect: Mood normal.         Behavior: Behavior normal.        Blood pressure 127/83, pulse 69, weight 73.3 kg (161 lb 9.6 oz).  Wt Readings from Last 3 Encounters:   07/23/25 73.3 kg (161 lb 9.6 oz)   04/23/25 70.8 kg (156 lb 1.4 oz)   12/11/24 73.1 kg (161 lb 2.5 oz)       LAB REVIEW  Lab Results   Component Value Date     04/23/2025    K 4.2 04/23/2025     04/23/2025    CO2 26 04/23/2025    BUN 16 04/23/2025    CREATININE 0.9 04/23/2025    CALCIUM 10.8 (H) 04/23/2025    ANIONGAP 9 04/23/2025    EGFRNORACEVR >60 04/23/2025     Lab Results   Component Value Date    CPEPTIDE 1.27 09/10/2024    GLUTAMICACID 0.00 09/10/2024     Hemoglobin A1C   Date Value Ref Range Status   09/10/2024 8.3 (H) 4.0 - 5.6 % Final     Comment:     ADA Screening Guidelines:  5.7-6.4%  Consistent with prediabetes  >or=6.5%  Consistent with diabetes    High levels of fetal hemoglobin interfere with the HbA1C  assay. Heterozygous hemoglobin variants (HbS, HgC, etc)do  not significantly interfere with this assay.   However, presence of multiple variants may affect accuracy.     06/13/2024 8.6 (H) 4.0 - 5.6 % Final     Comment:     ADA Screening Guidelines:  5.7-6.4%  Consistent with prediabetes  >or=6.5%  Consistent with diabetes    High levels of fetal hemoglobin interfere with the HbA1C  assay. Heterozygous hemoglobin variants (HbS, HgC, etc)do  not significantly interfere with this assay.   However, presence of multiple variants may affect accuracy.     02/12/2024 11.4 (H) 4.0 - 5.6 % Final     Comment:     ADA Screening Guidelines:  5.7-6.4%   Consistent with prediabetes  >or=6.5%  Consistent with diabetes    High levels of fetal hemoglobin interfere with the HbA1C  assay. Heterozygous hemoglobin variants (HbS, HgC, etc)do  not significantly interfere with this assay.   However, presence of multiple variants may affect accuracy.       Hemoglobin A1c   Date Value Ref Range Status   04/23/2025 9.6 (H) 4.0 - 5.6 % Final     Comment:     ADA Screening Guidelines:  5.7-6.4%  Consistent with prediabetes  >=6.5%  Consistent with diabetes    High levels of fetal hemoglobin interfere with the HbA1C  assay. Heterozygous hemoglobin variants (HbS, HgC, etc)do  not significantly interfere with this assay.   However, presence of multiple variants may affect accuracy.       Lab Results   Component Value Date    POCGLU 144 (A) 07/23/2025       ASSESSMENT    ICD-10-CM ICD-9-CM   1. Uncontrolled type 2 diabetes mellitus with hyperglycemia  E11.65 250.02   2. S/P coronary artery stent placement  Z95.5 V45.82   3. Coronary artery disease of native artery of native heart with stable angina pectoris  I25.118 414.01     413.9   4. Essential hypertension  I10 401.9   5. Mixed hyperlipidemia  E78.2 272.2       RHIANNA Vides was seen today for diabetes mellitus.    Diagnoses and all orders for this visit:    Uncontrolled type 2 diabetes mellitus with hyperglycemia  -     POCT Glucose, Hand-Held Device  -     Hemoglobin A1C; Future  -     Comprehensive Metabolic Panel; Future  -     Lipid Panel; Future  -     pioglitazone (ACTOS) 45 MG tablet; Take 1 tablet (45 mg total) by mouth once daily.  -     empagliflozin-metformin (SYNJARDY XR) 25-1,000 mg TBph; Take 1 tablet by mouth once daily.  -     linaGLIPtin (TRADJENTA) 5 mg Tab tablet; Take 1 tablet (5 mg total) by mouth once daily.    S/P coronary artery stent placement    Coronary artery disease of native artery of native heart with stable angina pectoris    Essential hypertension    Mixed hyperlipidemia      Reviewed  "pathophysiology of diabetes, complications related to the disease, importance of annual dilated eye exam and daily foot examination. Explained [place], dosage of medications. Written instructions given and reviewed with patient and patient verbalizes understanding.     3/13/2024 - out of medications for 1 month. Last visit with me in April 2023. A1c up to 11.4. discussed that if glucoses are not better controlled he will likely require insulin. Patient states "I can't give myself injections". Has 2 glucose meters at home, does not check. Needs to check glucose 1-2 times a day. Will refill medications and f/u in 6 weeks.     6/18/2024 - A1c improved to 8.4. amenable to trying Mounjaro due to not being able to see the needle. Discussed diet, avoiding sweet tea.    9/4/2024 - he states he attempted to take the trulicity, but didn't successfully inject the medication and does not know if he got any. Has not tried again. He will have a family member administer. He drank a regular cold drink this morning, resulting in glucose over 200. Discussed dietary changes.     10/30/2024 - still did not start Trulicity nor checking glucoses. No changes to diet other than no regular cold drinks. Discussed protein options today. Will send mounjaro to replace trulicity, encouraged patient to try. F/u 6 wks.     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 Does not check glucoses due to needle phobia. Has not made changes to diet. Will check A1c today. Increase actos to 30 mg.     7/23/2025 - tolerating medications, increase actos to 45. Labs today.       PATIENT INSTRUCTIONS     Labs today.     Lifestyle modification with well balanced diet and at least 30 minutes of physical activity daily recommended.   Increase water intake.   Increase protein and non-starchy vegetable servings with meals.   Decrease carbohydrate servings with meals.   Take 10 minute walk after each meal.   Avoid snacking on carbohydrates, choose low " carb, high protein snacks.   Incorporate resistance training with weights or resistance bands with exercise regimen at least 3 days a week.        Continue Synjardy XR  mg by mouth daily.   Increase Actos to 45 mg by mouth daily.   Continue Tradjenta 5 mg by mouth daily.      Check blood sugar twice daily. Every morning when you wake up and 1-2 hours after main meal of the day. Keep log and upload to [employer] prior to each visit.     Blood Sugar Goals:       Fastin-130.       1-2 hours after a meal: Less than 180.      Follow up in about 3 months (around 10/23/2025) for No Device, Schedule fasting labs, NAVEEN needed for outside diabetic eye exam -.    Portions of this note were prepared with [employer] voice recognition transcription software. Grammatical errors, including garbled syntax, mangle pronouns, and other bizarre constructions may be attributed to that software system.

## 2025-07-24 ENCOUNTER — TELEPHONE (OUTPATIENT)
Dept: DIABETES | Facility: CLINIC | Age: 40
End: 2025-07-24
Payer: MEDICAID

## 2025-08-05 DIAGNOSIS — I25.118 CORONARY ARTERY DISEASE OF NATIVE ARTERY OF NATIVE HEART WITH STABLE ANGINA PECTORIS: ICD-10-CM

## 2025-08-05 DIAGNOSIS — E11.65 UNCONTROLLED TYPE 2 DIABETES MELLITUS WITH HYPERGLYCEMIA: ICD-10-CM

## 2025-08-05 RX ORDER — CLOPIDOGREL BISULFATE 75 MG/1
75 TABLET ORAL
Qty: 30 TABLET | Refills: 0 | OUTPATIENT
Start: 2025-08-05

## 2025-08-05 RX ORDER — ATORVASTATIN CALCIUM 40 MG/1
40 TABLET, FILM COATED ORAL
Qty: 30 TABLET | Refills: 0 | OUTPATIENT
Start: 2025-08-05

## 2025-08-05 RX ORDER — LOSARTAN POTASSIUM 25 MG/1
25 TABLET ORAL
Qty: 30 TABLET | Refills: 0 | OUTPATIENT
Start: 2025-08-05

## 2025-08-06 ENCOUNTER — TELEPHONE (OUTPATIENT)
Dept: CARDIOLOGY | Facility: CLINIC | Age: 40
End: 2025-08-06
Payer: MEDICAID

## 2025-08-26 DIAGNOSIS — I25.118 CORONARY ARTERY DISEASE OF NATIVE ARTERY OF NATIVE HEART WITH STABLE ANGINA PECTORIS: Primary | ICD-10-CM

## 2025-08-26 DIAGNOSIS — Z95.5 S/P CORONARY ARTERY STENT PLACEMENT: ICD-10-CM

## 2025-08-26 DIAGNOSIS — I10 ESSENTIAL HYPERTENSION: ICD-10-CM
